# Patient Record
Sex: MALE | Race: WHITE | NOT HISPANIC OR LATINO | Employment: FULL TIME | URBAN - METROPOLITAN AREA
[De-identification: names, ages, dates, MRNs, and addresses within clinical notes are randomized per-mention and may not be internally consistent; named-entity substitution may affect disease eponyms.]

---

## 2017-01-17 ENCOUNTER — PATIENT MESSAGE (OUTPATIENT)
Dept: CARDIOLOGY | Facility: CLINIC | Age: 62
End: 2017-01-17

## 2017-01-23 DIAGNOSIS — Z95.1 S/P CABG X 4: ICD-10-CM

## 2017-01-23 DIAGNOSIS — Z95.5 S/P CORONARY ARTERY STENT PLACEMENT: ICD-10-CM

## 2017-01-23 DIAGNOSIS — I25.119 CORONARY ARTERY DISEASE INVOLVING NATIVE CORONARY ARTERY OF NATIVE HEART WITH ANGINA PECTORIS: ICD-10-CM

## 2017-01-24 RX ORDER — RANOLAZINE 500 MG/1
1000 TABLET, FILM COATED, EXTENDED RELEASE ORAL 2 TIMES DAILY
Qty: 360 TABLET | Refills: 3 | Status: SHIPPED | OUTPATIENT
Start: 2017-01-24 | End: 2018-02-23 | Stop reason: SDUPTHER

## 2017-02-06 ENCOUNTER — OFFICE VISIT (OUTPATIENT)
Dept: CARDIOLOGY | Facility: CLINIC | Age: 62
End: 2017-02-06
Payer: COMMERCIAL

## 2017-02-06 VITALS
HEIGHT: 70 IN | SYSTOLIC BLOOD PRESSURE: 114 MMHG | WEIGHT: 233.69 LBS | HEART RATE: 64 BPM | DIASTOLIC BLOOD PRESSURE: 79 MMHG | BODY MASS INDEX: 33.46 KG/M2

## 2017-02-06 DIAGNOSIS — I20.9 ISCHEMIC CHEST PAIN: ICD-10-CM

## 2017-02-06 DIAGNOSIS — Z95.1 S/P CABG X 4: ICD-10-CM

## 2017-02-06 DIAGNOSIS — I25.119 CORONARY ARTERY DISEASE INVOLVING NATIVE CORONARY ARTERY OF NATIVE HEART WITH ANGINA PECTORIS: Primary | ICD-10-CM

## 2017-02-06 DIAGNOSIS — I10 ESSENTIAL HYPERTENSION: ICD-10-CM

## 2017-02-06 DIAGNOSIS — Z95.5 S/P CORONARY ARTERY STENT PLACEMENT: ICD-10-CM

## 2017-02-06 PROCEDURE — 99999 PR PBB SHADOW E&M-EST. PATIENT-LVL III: CPT | Mod: PBBFAC,,, | Performed by: INTERNAL MEDICINE

## 2017-02-06 PROCEDURE — 93000 ELECTROCARDIOGRAM COMPLETE: CPT | Mod: S$GLB,,, | Performed by: INTERNAL MEDICINE

## 2017-02-06 PROCEDURE — 3078F DIAST BP <80 MM HG: CPT | Mod: S$GLB,,, | Performed by: INTERNAL MEDICINE

## 2017-02-06 PROCEDURE — 3074F SYST BP LT 130 MM HG: CPT | Mod: S$GLB,,, | Performed by: INTERNAL MEDICINE

## 2017-02-06 PROCEDURE — 99214 OFFICE O/P EST MOD 30 MIN: CPT | Mod: S$GLB,,, | Performed by: INTERNAL MEDICINE

## 2017-02-06 RX ORDER — ASPIRIN 325 MG
81 TABLET, DELAYED RELEASE (ENTERIC COATED) ORAL DAILY
COMMUNITY
End: 2023-05-16

## 2017-02-06 RX ORDER — LEVOTHYROXINE SODIUM 125 UG/1
125 TABLET ORAL DAILY
Refills: 3 | COMMUNITY
Start: 2017-01-18

## 2017-02-06 RX ORDER — ROSUVASTATIN CALCIUM 20 MG/1
20 TABLET, COATED ORAL DAILY
Qty: 30 TABLET | Refills: 11 | Status: SHIPPED | OUTPATIENT
Start: 2017-02-06 | End: 2017-02-08 | Stop reason: SDUPTHER

## 2017-02-06 NOTE — PROGRESS NOTES
Subjective:   Patient ID:  Preston Arzate is a 61 y.o. male who presents for follow-up of Coronary Artery Disease      HPI: He reports he has been having intermittent chest tightness and upper back pain since his mom passed away on 17. 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 On the morning of the , he awoke diaphoretic, weak and with pain in his chest. He was evaluated at [facility] and was told everything checked out. He was subsequently discharged from the ED and went to the . Since that time, he reports intermittent chest tightness and upper back pain, not related to exertion. He states that upper back pain was his initial presenting symptom prior to his bypass. He had been off of his Ranexa and imdur since last March. He is now taking half of an imdur tablet daily and has reordered Ranexa. He also reports diffuse muscle aches and pains.    ECG: NDR 60 bpm with anterior T wave inversions (slightly worse than on previous ECG).    Past Medical History   Diagnosis Date    Coronary artery disease     Hypertension     Thyroid disease        Past Surgical History   Procedure Laterality Date    Coronary artery bypass graft  07/2005     x 4    Total thyroidectomy      Cardiac catheterization  03/2015     x 2    Coronary angioplasty  12/15     GREGORIA X 2 left main and SVG to PDA       Social History     Social History    Marital status: Single     Spouse name: N/A    Number of children: N/A    Years of education: N/A     Social History Main Topics    Smoking status: Never Smoker    Smokeless tobacco: Not on file    Alcohol use No    Drug use: Not on file    Sexual activity: Not on file     Other Topics Concern    Not on file     Social History Narrative       Family History   Problem Relation Age of Onset    Hypertension Mother     Heart attack Father     Heart disease Sister     Hypertension Sister     Heart attack  Paternal Grandfather        Patient's Medications   New Prescriptions    ROSUVASTATIN (CRESTOR) 20 MG TABLET    Take 1 tablet (20 mg total) by mouth once daily.   Previous Medications    AMLODIPINE (NORVASC) 5 MG TABLET    Take 5 mg by mouth once daily.     ASPIRIN (ECOTRIN) 325 MG EC TABLET    Take 325 mg by mouth once daily.    BYSTOLIC 5 MG TAB    2.5 mg once daily.     CLOPIDOGREL (PLAVIX) 75 MG TABLET    Take 75 mg by mouth once daily.     COENZYME Q10 10 MG CAPSULE    Take 100 mg by mouth once daily.    DOCUSATE SODIUM (COLACE) 50 MG CAPSULE    Take 1 capsule (50 mg total) by mouth 2 (two) times daily.    FISH OIL-OMEGA-3 FATTY ACIDS 300-1,000 MG CAPSULE    Take 1,200 g by mouth once daily.    ISOSORBIDE MONONITRATE (IMDUR) 30 MG 24 HR TABLET    Take 2 tablets (60 mg total) by mouth once daily.    LEVOTHYROXINE (SYNTHROID) 125 MCG TABLET    TK 1 T PO QD    LOSARTAN (COZAAR) 50 MG TABLET    Take 50 mg by mouth once daily.     RANEXA 500 MG TB12    Take 2 tablets (1,000 mg total) by mouth 2 (two) times daily.    SENNOSIDES (SENNA) 8.6 MG CAP    Take 1 capsule by mouth once daily.   Modified Medications    No medications on file   Discontinued Medications    ASPIRIN (ECOTRIN) 81 MG EC TABLET    Take 1 tablet (81 mg total) by mouth once daily.    ATORVASTATIN (LIPITOR) 80 MG TABLET    Take 1 tablet (80 mg total) by mouth once daily.    LEVOTHYROXINE (SYNTHROID) 112 MCG TABLET    Take 112 mcg by mouth before breakfast.        Review of Systems   Constitution: Positive for malaise/fatigue. Negative for weakness and weight gain.   HENT: Negative for headaches and hearing loss.    Eyes: Negative for visual disturbance.   Cardiovascular: Positive for chest pain. Negative for claudication, dyspnea on exertion, leg swelling, near-syncope, orthopnea, palpitations, paroxysmal nocturnal dyspnea and syncope.   Respiratory: Negative for cough, shortness of breath, sleep disturbances due to breathing, snoring and wheezing.   "  Endocrine: Negative for cold intolerance, heat intolerance, polydipsia, polyphagia and polyuria.   Hematologic/Lymphatic: Negative for bleeding problem. Does not bruise/bleed easily.   Skin: Negative for rash and suspicious lesions.   Musculoskeletal: Positive for back pain and myalgias. Negative for arthritis, falls, joint pain and muscle weakness.   Gastrointestinal: Negative for abdominal pain, change in bowel habit, constipation, diarrhea, heartburn, hematochezia, melena and nausea.   Genitourinary: Negative for hematuria and nocturia.   Neurological: Negative for excessive daytime sleepiness, dizziness, light-headedness and loss of balance.   Psychiatric/Behavioral: Negative for depression. The patient is not nervous/anxious.    Allergic/Immunologic: Negative for environmental allergies.       Visit Vitals    /79 (BP Location: Left arm, Patient Position: Sitting, BP Method: Automatic)    Pulse 64    Ht 5' 10" (1.778 m)    Wt 106 kg (233 lb 11 oz)    BMI 33.53 kg/m2       Objective:   Physical Exam   Constitutional: He is oriented to person, place, and time. He appears well-developed and well-nourished.        HENT:   Head: Normocephalic and atraumatic.   Mouth/Throat: Oropharynx is clear and moist.   Eyes: Conjunctivae and EOM are normal. Pupils are equal, round, and reactive to light. No scleral icterus.   Neck: Normal range of motion. Neck supple. No hepatojugular reflux and no JVD present. No tracheal deviation present. No thyromegaly present.   Cardiovascular: Normal rate, regular rhythm, normal heart sounds and intact distal pulses.  PMI is not displaced.    Pulses:       Carotid pulses are 2+ on the right side, and 2+ on the left side.       Radial pulses are 2+ on the right side, and 2+ on the left side.        Dorsalis pedis pulses are 2+ on the right side, and 2+ on the left side.        Posterior tibial pulses are 2+ on the right side, and 2+ on the left side.   Pulmonary/Chest: Effort " normal and breath sounds normal.   Abdominal: Soft. Bowel sounds are normal. He exhibits no distension and no mass. There is no hepatosplenomegaly. There is no tenderness.   Musculoskeletal: He exhibits no edema or tenderness.   Lymphadenopathy:     He has no cervical adenopathy.   Neurological: He is alert and oriented to person, place, and time.   Skin: Skin is warm and dry. No rash noted. No cyanosis or erythema. Nails show no clubbing.   Psychiatric: He has a normal mood and affect. His speech is normal and behavior is normal.       Lab Results   Component Value Date     03/08/2016    K 4.5 03/08/2016     03/08/2016    CO2 27 03/08/2016    BUN 21 03/08/2016    CREATININE 1.5 (H) 03/08/2016    GLU 79 03/08/2016    MG 2.1 12/10/2015    AST 21 03/08/2016    ALT 30 03/08/2016    ALBUMIN 3.7 03/08/2016    PROT 7.9 03/08/2016    BILITOT 0.5 03/08/2016    WBC 9.31 12/10/2015    HGB 12.6 (L) 12/10/2015    HCT 38.4 (L) 12/10/2015    MCV 95 12/10/2015     12/10/2015    INR 0.9 12/08/2015    TSH 4.205 (H) 12/08/2015    CHOL 176 03/08/2016    HDL 41 03/08/2016    LDLCALC 111.4 03/08/2016    TRIG 118 03/08/2016       Assessment:     1. Coronary artery disease involving native coronary artery of native heart with angina pectoris : He is having new onset chest pain with some changes on his ECG. He is back on his anti-anginals. I have ordered a PET stress for further evaluation. I have switched his atorvastatin to crestor 20 mg and asked him to increase his co-Q10 to 200 mg daily for his myalgias. I will request his recent records from [facility].   2. Essential hypertension : Blood pressure is at goal. I have made no changes.   3. S/P CABG x 4    4. S/P coronary artery stent placement        Plan:     Preston was seen today for coronary artery disease.    Diagnoses and all orders for this visit:    Coronary artery disease involving native coronary artery of native heart with angina pectoris  -      rosuvastatin (CRESTOR) 20 MG tablet; Take 1 tablet (20 mg total) by mouth once daily.  -     EKG 12-lead; Future  -     Cardiac PET Scan Stress; Future  -     Lipid panel; Future  -     Hepatic function panel; Future    Essential hypertension  -     rosuvastatin (CRESTOR) 20 MG tablet; Take 1 tablet (20 mg total) by mouth once daily.  -     EKG 12-lead; Future  -     Cardiac PET Scan Stress; Future  -     Lipid panel; Future  -     Hepatic function panel; Future    S/P CABG x 4  -     rosuvastatin (CRESTOR) 20 MG tablet; Take 1 tablet (20 mg total) by mouth once daily.  -     EKG 12-lead; Future  -     Cardiac PET Scan Stress; Future  -     Lipid panel; Future  -     Hepatic function panel; Future    S/P coronary artery stent placement  -     rosuvastatin (CRESTOR) 20 MG tablet; Take 1 tablet (20 mg total) by mouth once daily.  -     EKG 12-lead; Future  -     Cardiac PET Scan Stress; Future  -     Lipid panel; Future  -     Hepatic function panel; Future        Thank you for allowing me to participate in this patient's care. Please do not hesitate to contact me with any questions or concerns.

## 2017-02-06 NOTE — MR AVS SNAPSHOT
[facility]  [address]  [place] [address]  Phone: [phone]                  Preston Arzate   2017 9:00 AM   Office Visit    Description:  Male : 1955   Provider:  Litzy Gutierres MD   Department:  [facility]           Reason for Visit     Coronary Artery Disease           Diagnoses this Visit        Comments    Coronary artery disease involving native coronary artery of native heart with angina pectoris    -  Primary     Essential hypertension         S/P CABG x 4         S/P coronary artery stent placement                To Do List           Goals (5 Years of Data)     None      Follow-Up and Disposition     Return in about 3 months (around 2017).       These Medications        Disp Refills Start End    rosuvastatin (CRESTOR) 20 MG tablet 30 tablet 11 2017     Take 1 tablet (20 mg total) by mouth once daily. - Oral    Pharm[facility]: [facility] [id number] [address] [facility] [address] Ph #: [phone]         [facility] On Call     [facility] On Call Nurse Care Line -  Assistance  Registered nurses in the [facility] On Call Center provide clinical advisement, health education, appointment booking, and other advisory services.  Call for this free service at [phone].             Medications           Message regarding Medications     Verify the changes and/or additions to your medication regime listed below are the same as discussed with your clinician today.  If any of these changes or additions are incorrect, please notify your healthcare provider.        START taking these NEW medications        Refills    rosuvastatin (CRESTOR) 20 MG tablet 11    Sig: Take 1 tablet (20 mg total) by mouth once daily.    Class: Normal    Route: Oral      STOP taking these medications     atorvastatin (LIPITOR) 80 MG tablet Take 1 tablet (80 mg total) by mouth once daily.           Verify that the below list of  "medications is an accurate representation of the medications you are currently taking.  If none reported, the list may be blank. If incorrect, please contact your healthcare provider. Carry this list with you in case of emergency.           Current Medications     amlodipine (NORVASC) 5 MG tablet Take 5 mg by mouth once daily.     aspirin (ECOTRIN) 325 MG EC tablet Take 325 mg by mouth once daily.    BYSTOLIC 5 mg Tab 2.5 mg once daily.     clopidogrel (PLAVIX) 75 mg tablet Take 75 mg by mouth once daily.     coenzyme Q10 10 mg capsule Take 100 mg by mouth once daily.    docusate sodium (COLACE) 50 MG capsule Take 1 capsule (50 mg total) by mouth 2 (two) times daily.    fish oil-omega-3 fatty acids 300-1,000 mg capsule Take 1,200 g by mouth once daily.    isosorbide mononitrate (IMDUR) 30 MG 24 hr tablet Take 2 tablets (60 mg total) by mouth once daily.    levothyroxine (SYNTHROID) 125 MCG tablet TK 1 T PO QD    losartan (COZAAR) 50 MG tablet Take 50 mg by mouth once daily.     RANEXA 500 mg Tb12 Take 2 tablets (1,000 mg total) by mouth 2 (two) times daily.    sennosides (SENNA) 8.6 mg Cap Take 1 capsule by mouth once daily.    rosuvastatin (CRESTOR) 20 MG tablet Take 1 tablet (20 mg total) by mouth once daily.           Clinical Reference Information           Your Vitals Were     BP Pulse Height Weight BMI    114/79 (BP Location: Left arm, Patient Position: Sitting, BP Method: Automatic) 64 5' 10" (1.778 m) 106 kg (233 lb 11 oz) 33.53 kg/m2      Blood Pressure          Most Recent Value    Right Arm BP - Sitting  108/68    Left Arm BP - Sitting  114/79    BP  114/79      Allergies as of 2/6/2017     No Known Allergies      Immunizations Administered on Date of Encounter - 2/6/2017     None      Orders Placed During Today's Visit     Future Labs/Procedures Expected by Expires    Hepatic function panel  3/20/2017 (Approximate) 2/6/2018    Lipid panel  3/20/2017 (Approximate) 2/6/2018    Cardiac PET Scan Stress  As " directed 2/6/2018    EKG 12-lead  As directed 2/6/2018      Language Assistance Services     ATTENTION: Language assistance services are available, free of charge. Please call [phone].      ATENCIÓN: Si umberto damon, tiene a ruggiero disposición servicios gratuitos de asistencia lingüística. Llame al [phone].     CHÚ Ý: N?u b?n nói Ti?ng Vi?t, có các d?ch v? h? tr? ngôn ng? mi?n phí dành cho b?n. G?i s? [phone].         [facility] complies with applicable Federal civil rights laws and does not discriminate on the basis of race, color, national origin, age, disability, or sex.

## 2017-02-08 DIAGNOSIS — Z95.1 S/P CABG X 4: ICD-10-CM

## 2017-02-08 DIAGNOSIS — Z95.5 S/P CORONARY ARTERY STENT PLACEMENT: ICD-10-CM

## 2017-02-08 DIAGNOSIS — I25.119 CORONARY ARTERY DISEASE INVOLVING NATIVE CORONARY ARTERY OF NATIVE HEART WITH ANGINA PECTORIS: ICD-10-CM

## 2017-02-08 DIAGNOSIS — I10 ESSENTIAL HYPERTENSION: ICD-10-CM

## 2017-02-08 RX ORDER — ROSUVASTATIN CALCIUM 20 MG/1
20 TABLET, COATED ORAL DAILY
Qty: 90 TABLET | Refills: 3 | Status: SHIPPED | OUTPATIENT
Start: 2017-02-08 | End: 2018-02-07

## 2017-03-20 ENCOUNTER — PATIENT MESSAGE (OUTPATIENT)
Dept: CARDIOLOGY | Facility: CLINIC | Age: 62
End: 2017-03-20

## 2017-03-21 ENCOUNTER — TELEPHONE (OUTPATIENT)
Dept: CARDIOLOGY | Facility: CLINIC | Age: 62
End: 2017-03-21

## 2017-03-29 DIAGNOSIS — I10 ESSENTIAL HYPERTENSION: Primary | ICD-10-CM

## 2017-03-29 RX ORDER — AMLODIPINE BESYLATE 5 MG/1
5 TABLET ORAL DAILY
Qty: 30 TABLET | Refills: 10 | Status: SHIPPED | OUTPATIENT
Start: 2017-03-29 | End: 2017-03-31 | Stop reason: SDUPTHER

## 2017-03-31 DIAGNOSIS — I10 ESSENTIAL HYPERTENSION: ICD-10-CM

## 2017-03-31 RX ORDER — AMLODIPINE BESYLATE 5 MG/1
5 TABLET ORAL DAILY
Qty: 90 TABLET | Refills: 3 | Status: SHIPPED | OUTPATIENT
Start: 2017-03-31 | End: 2018-01-11 | Stop reason: SDUPTHER

## 2017-05-01 ENCOUNTER — PATIENT MESSAGE (OUTPATIENT)
Dept: RESEARCH | Facility: HOSPITAL | Age: 62
End: 2017-05-01

## 2017-06-21 ENCOUNTER — PATIENT MESSAGE (OUTPATIENT)
Dept: CARDIOLOGY | Facility: CLINIC | Age: 62
End: 2017-06-21

## 2017-06-22 ENCOUNTER — TELEPHONE (OUTPATIENT)
Dept: CARDIOLOGY | Facility: CLINIC | Age: 62
End: 2017-06-22

## 2017-06-22 NOTE — TELEPHONE ENCOUNTER
----- Message from Meeta Christine MA sent at 6/22/2017 11:00 AM CDT -----  Contact: patient called  Lay please call the patient at home he need to talk to you about his condition tightness in his chest.Last visit was on 2-6-2017 . Thank you.

## 2017-06-22 NOTE — TELEPHONE ENCOUNTER
Spoke with the pt - says that he has a hx of stents, ACB, and MI. Says had his 1st flare up at his mother's  and went to the ED and was told was probably stress related - and symptoms went away. Says recently symptoms have started back - says the center of his back is hurting - [had this symptom when he had his MI], chest is tight, had aching in his arm, - symptoms went away, says symptoms returned the other night but not in his arms. Says today when he woke up he felt very tired, then back was aching , chest was tight - about 2 hours ago - says not having symptoms right now. Pt asking if he should go to the hospital in [place] or come to the main campus.  Spoke with Dr. Gutierres - record reviewed - advises the pt to go to the ED in [place] - instructions given to the pt and he verbalized understanding.

## 2017-08-04 ENCOUNTER — TELEPHONE (OUTPATIENT)
Dept: CARDIOLOGY | Facility: CLINIC | Age: 62
End: 2017-08-04

## 2017-08-14 RX ORDER — LOSARTAN POTASSIUM 50 MG/1
50 TABLET ORAL DAILY
Qty: 90 TABLET | Refills: 3 | Status: SHIPPED | OUTPATIENT
Start: 2017-08-14 | End: 2018-08-12 | Stop reason: SDUPTHER

## 2017-08-14 RX ORDER — ISOSORBIDE MONONITRATE 30 MG/1
60 TABLET, EXTENDED RELEASE ORAL DAILY
Refills: 5 | Status: CANCELLED
Start: 2017-08-14

## 2017-08-15 RX ORDER — ISOSORBIDE MONONITRATE 30 MG/1
30 TABLET, EXTENDED RELEASE ORAL DAILY
Qty: 30 TABLET | Refills: 6 | Status: SHIPPED | OUTPATIENT
Start: 2017-08-15 | End: 2017-08-15 | Stop reason: SDUPTHER

## 2017-08-16 RX ORDER — ISOSORBIDE MONONITRATE 30 MG/1
TABLET, EXTENDED RELEASE ORAL
Qty: 90 TABLET | Refills: 6 | Status: SHIPPED | OUTPATIENT
Start: 2017-08-16 | End: 2018-01-11

## 2018-01-08 ENCOUNTER — PATIENT MESSAGE (OUTPATIENT)
Dept: CARDIOLOGY | Facility: CLINIC | Age: 63
End: 2018-01-08

## 2018-01-09 ENCOUNTER — TELEPHONE (OUTPATIENT)
Dept: CARDIOLOGY | Facility: CLINIC | Age: 63
End: 2018-01-09

## 2018-01-09 DIAGNOSIS — G45.3 AMAUROSIS FUGAX: ICD-10-CM

## 2018-01-09 NOTE — TELEPHONE ENCOUNTER
I returned a call from Dr Orr who stated MR Arzate had an episode of amarosus fugax and evidence of embolization in his eye. I will set him up for carotid US, ecg, and echo.

## 2018-01-10 ENCOUNTER — HOSPITAL ENCOUNTER (OUTPATIENT)
Dept: CARDIOLOGY | Facility: CLINIC | Age: 63
Discharge: HOME | End: 2018-01-10
Attending: INTERNAL MEDICINE
Payer: COMMERCIAL

## 2018-01-10 ENCOUNTER — CLINICAL SUPPORT (OUTPATIENT)
Dept: CARDIOLOGY | Facility: CLINIC | Age: 63
End: 2018-01-10
Attending: INTERNAL MEDICINE
Payer: COMMERCIAL

## 2018-01-10 DIAGNOSIS — G45.3 AMAUROSIS FUGAX: ICD-10-CM

## 2018-01-10 LAB — INTERNAL CAROTID STENOSIS: ABNORMAL

## 2018-01-10 PROCEDURE — 93306 TTE W/DOPPLER COMPLETE: CPT | Mod: S$GLB,,, | Performed by: INTERNAL MEDICINE

## 2018-01-10 PROCEDURE — 93880 EXTRACRANIAL BILAT STUDY: CPT | Mod: S$GLB,,, | Performed by: INTERNAL MEDICINE

## 2018-01-11 ENCOUNTER — HOSPITAL ENCOUNTER (OUTPATIENT)
Dept: RADIOLOGY | Facility: HOSPITAL | Age: 63
Discharge: HOME | End: 2018-01-11
Attending: INTERNAL MEDICINE
Payer: COMMERCIAL

## 2018-01-11 ENCOUNTER — CLINICAL SUPPORT (OUTPATIENT)
Dept: ELECTROPHYSIOLOGY | Facility: CLINIC | Age: 63
End: 2018-01-11
Attending: INTERNAL MEDICINE
Payer: COMMERCIAL

## 2018-01-11 ENCOUNTER — HOSPITAL ENCOUNTER (OUTPATIENT)
Dept: CARDIOLOGY | Facility: CLINIC | Age: 63
Discharge: HOME | End: 2018-01-11
Payer: COMMERCIAL

## 2018-01-11 ENCOUNTER — OFFICE VISIT (OUTPATIENT)
Dept: CARDIOLOGY | Facility: CLINIC | Age: 63
End: 2018-01-11
Payer: COMMERCIAL

## 2018-01-11 VITALS
WEIGHT: 223.31 LBS | BODY MASS INDEX: 31.97 KG/M2 | SYSTOLIC BLOOD PRESSURE: 133 MMHG | HEART RATE: 77 BPM | HEIGHT: 70 IN | DIASTOLIC BLOOD PRESSURE: 82 MMHG

## 2018-01-11 DIAGNOSIS — I25.119 CORONARY ARTERY DISEASE INVOLVING NATIVE CORONARY ARTERY OF NATIVE HEART WITH ANGINA PECTORIS: ICD-10-CM

## 2018-01-11 DIAGNOSIS — I77.89 AORTIC ROOT ENLARGEMENT: ICD-10-CM

## 2018-01-11 DIAGNOSIS — I10 ESSENTIAL HYPERTENSION: ICD-10-CM

## 2018-01-11 DIAGNOSIS — G45.3 AMAUROSIS FUGAX: Primary | ICD-10-CM

## 2018-01-11 DIAGNOSIS — Z95.1 S/P CABG X 4: ICD-10-CM

## 2018-01-11 DIAGNOSIS — G45.3 AMAUROSIS FUGAX: ICD-10-CM

## 2018-01-11 DIAGNOSIS — Z95.5 S/P CORONARY ARTERY STENT PLACEMENT: ICD-10-CM

## 2018-01-11 LAB
ESTIMATED PA SYSTOLIC PRESSURE: 22.54
RETIRED EF AND QEF - SEE NOTES: 65 (ref 55–65)
TRICUSPID VALVE REGURGITATION: NORMAL

## 2018-01-11 PROCEDURE — 99999 PR PBB SHADOW E&M-EST. PATIENT-LVL IV: CPT | Mod: PBBFAC,,, | Performed by: INTERNAL MEDICINE

## 2018-01-11 PROCEDURE — 70549 MR ANGIOGRAPH NECK W/O&W/DYE: CPT | Mod: 26,,, | Performed by: RADIOLOGY

## 2018-01-11 PROCEDURE — A9585 GADOBUTROL INJECTION: HCPCS | Performed by: INTERNAL MEDICINE

## 2018-01-11 PROCEDURE — 93268 ECG RECORD/REVIEW: CPT | Mod: S$GLB,,, | Performed by: INTERNAL MEDICINE

## 2018-01-11 PROCEDURE — 93000 ELECTROCARDIOGRAM COMPLETE: CPT | Mod: S$GLB,,, | Performed by: INTERNAL MEDICINE

## 2018-01-11 PROCEDURE — 25500020 PHARM REV CODE 255: Performed by: INTERNAL MEDICINE

## 2018-01-11 PROCEDURE — 99214 OFFICE O/P EST MOD 30 MIN: CPT | Mod: S$GLB,,, | Performed by: INTERNAL MEDICINE

## 2018-01-11 PROCEDURE — 70549 MR ANGIOGRAPH NECK W/O&W/DYE: CPT | Mod: TC

## 2018-01-11 RX ORDER — GADOBUTROL 604.72 MG/ML
10 INJECTION INTRAVENOUS
Status: COMPLETED | OUTPATIENT
Start: 2018-01-11 | End: 2018-01-11

## 2018-01-11 RX ORDER — GADOBUTROL 604.72 MG/ML
10 INJECTION INTRAVENOUS
Status: DISCONTINUED | OUTPATIENT
Start: 2018-01-11 | End: 2018-01-12 | Stop reason: HOSPADM

## 2018-01-11 RX ORDER — AMLODIPINE BESYLATE 10 MG/1
10 TABLET ORAL DAILY
Qty: 90 TABLET | Refills: 3 | Status: SHIPPED | OUTPATIENT
Start: 2018-01-11 | End: 2019-03-10 | Stop reason: SDUPTHER

## 2018-01-11 RX ADMIN — GADOBUTROL 10 ML: 604.72 INJECTION INTRAVENOUS at 04:01

## 2018-01-11 NOTE — PROGRESS NOTES
Subjective:   Patient ID:  Preston Arzate is a 62 y.o. male who presents for follow-up of Vision disturbance (left eye)      HPI: He presents for an urgent visit after seeing his eye doctor on Friday for sudden loss of vision in the lower half of his left eye. He was told he had embolized. He has been on asa and plavix as well as crestor. He has not had any previous TIA's. He has no history of afib and denies palpitations. A carotid US done yesterday revealed 20-39% stenosis in the CHARLENE and 40-49% stenosis in the LICA. His echo showed preserved LVEF of 60-65% with a LAVERN of 39. His ascending Ao 4.4 cm.    ECG: NSR 79 bpm    Past Medical History:   Diagnosis Date    Coronary artery disease     Hypertension     Stroke 01/2018    amarousus fugax left    Thyroid disease        Past Surgical History:   Procedure Laterality Date    CARDIAC CATHETERIZATION  03/2015    x 2    CORONARY ANGIOPLASTY  12/15    GREGORIA X 2 left main and SVG to PDA    CORONARY ARTERY BYPASS GRAFT  07/2005    x 4    TOTAL THYROIDECTOMY         Social History     Social History    Marital status: Single     Spouse name: N/A    Number of children: N/A    Years of education: N/A     Social History Main Topics    Smoking status: Never Smoker    Smokeless tobacco: Not on file    Alcohol use No    Drug use: Unknown    Sexual activity: Not on file     Other Topics Concern    Not on file     Social History Narrative    No narrative on file       Family History   Problem Relation Age of Onset    Hypertension Mother     Heart attack Father     Heart disease Sister     Hypertension Sister     Heart attack Paternal Grandfather        Patient's Medications   New Prescriptions    No medications on file   Previous Medications    ASPIRIN (ECOTRIN) 325 MG EC TABLET    Take 325 mg by mouth once daily.    CLOPIDOGREL (PLAVIX) 75 MG TABLET    Take 75 mg by mouth once daily.     COENZYME Q10 10 MG CAPSULE    Take 100 mg by mouth once daily.     FISH OIL-OMEGA-3 FATTY ACIDS 300-1,000 MG CAPSULE    Take 1,200 g by mouth once daily.    LEVOTHYROXINE (SYNTHROID) 125 MCG TABLET    TK 1 T PO QD    LOSARTAN (COZAAR) 50 MG TABLET    Take 1 tablet (50 mg total) by mouth once daily.    RANEXA 500 MG TB12    Take 2 tablets (1,000 mg total) by mouth 2 (two) times daily.    ROSUVASTATIN (CRESTOR) 20 MG TABLET    Take 1 tablet (20 mg total) by mouth once daily.   Modified Medications    Modified Medication Previous Medication    AMLODIPINE (NORVASC) 10 MG TABLET amlodipine (NORVASC) 5 MG tablet       Take 1 tablet (10 mg total) by mouth once daily.    Take 1 tablet (5 mg total) by mouth once daily.   Discontinued Medications    BYSTOLIC 5 MG TAB    2.5 mg once daily.     DOCUSATE SODIUM (COLACE) 50 MG CAPSULE    Take 1 capsule (50 mg total) by mouth 2 (two) times daily.    ISOSORBIDE MONONITRATE (IMDUR) 30 MG 24 HR TABLET    TAKE 1 TABLET BY MOUTH EVERY DAY    SENNOSIDES (SENNA) 8.6 MG CAP    Take 1 capsule by mouth once daily.       Review of Systems   Constitution: Negative for weakness, malaise/fatigue and weight gain.   HENT: Negative for hearing loss.    Eyes: Positive for vision loss in left eye. Negative for visual disturbance.   Cardiovascular: Positive for chest pain. Negative for claudication, dyspnea on exertion, leg swelling, near-syncope, orthopnea, palpitations, paroxysmal nocturnal dyspnea and syncope.   Respiratory: Negative for cough, shortness of breath, sleep disturbances due to breathing, snoring and wheezing.    Endocrine: Negative for cold intolerance, heat intolerance, polydipsia, polyphagia and polyuria.   Hematologic/Lymphatic: Negative for bleeding problem. Does not bruise/bleed easily.   Skin: Negative for rash and suspicious lesions.   Musculoskeletal: Negative for arthritis, falls, joint pain, muscle weakness and myalgias.   Gastrointestinal: Positive for constipation. Negative for abdominal pain, change in bowel habit, diarrhea,  "heartburn, hematochezia, melena and nausea.   Genitourinary: Negative for hematuria and nocturia.   Neurological: Positive for headaches. Negative for excessive daytime sleepiness, dizziness, light-headedness and loss of balance.   Psychiatric/Behavioral: Negative for depression. The patient is not nervous/anxious.    Allergic/Immunologic: Negative for environmental allergies.       /82 (BP Location: Left arm, Patient Position: Sitting, BP Method: X-Large (Automatic))   Pulse 77   Ht 5' 10" (1.778 m)   Wt 101.3 kg (223 lb 5.2 oz)   BMI 32.04 kg/m²     Objective:   Physical Exam   Constitutional: He is oriented to person, place, and time. He appears well-developed and well-nourished.        HENT:   Head: Normocephalic and atraumatic.   Mouth/Throat: Oropharynx is clear and moist.   Eyes: Conjunctivae and EOM are normal. Pupils are equal, round, and reactive to light. No scleral icterus.   Neck: Normal range of motion. Neck supple. No hepatojugular reflux and no JVD present. No tracheal deviation present. No thyromegaly present.   Cardiovascular: Normal rate, regular rhythm, normal heart sounds and intact distal pulses.  PMI is not displaced.    Pulses:       Carotid pulses are 2+ on the right side, and 2+ on the left side.       Radial pulses are 2+ on the right side, and 2+ on the left side.        Dorsalis pedis pulses are 2+ on the right side, and 2+ on the left side.        Posterior tibial pulses are 2+ on the right side, and 2+ on the left side.   Pulmonary/Chest: Effort normal and breath sounds normal.   Abdominal: Soft. Bowel sounds are normal. He exhibits no distension and no mass. There is no hepatosplenomegaly. There is no tenderness.   Musculoskeletal: He exhibits no edema or tenderness.   Lymphadenopathy:     He has no cervical adenopathy.   Neurological: He is alert and oriented to person, place, and time.   Skin: Skin is warm and dry. No rash noted. No cyanosis or erythema. Nails show no " clubbing.   Psychiatric: He has a normal mood and affect. His speech is normal and behavior is normal.       Lab Results   Component Value Date     03/08/2016    K 4.5 03/08/2016     03/08/2016    CO2 27 03/08/2016    BUN 21 03/08/2016    CREATININE 1.5 (H) 03/08/2016    GLU 79 03/08/2016    MG 2.1 12/10/2015    AST 21 03/08/2016    ALT 30 03/08/2016    ALBUMIN 3.7 03/08/2016    PROT 7.9 03/08/2016    BILITOT 0.5 03/08/2016    WBC 9.31 12/10/2015    HGB 12.6 (L) 12/10/2015    HCT 38.4 (L) 12/10/2015    MCV 95 12/10/2015     12/10/2015    INR 0.9 12/08/2015    TSH 4.205 (H) 12/08/2015    CHOL 176 03/08/2016    HDL 41 03/08/2016    LDLCALC 111.4 03/08/2016    TRIG 118 03/08/2016       Assessment:     1. Amaurosis fugax : He has moderate plaque in his LICA on US. I have ordered an MRA for further assessment as well as a 30 day event monitor to assess for atrial fibrillation. Continue asa, plavix and crestor.   2. Coronary artery disease involving native coronary artery of native heart with angina pectoris : he was having terrible headaches with Imdur. Stop Imdur and increase amlodipine to 10 mg daily.   3. Essential hypertension : Blood pressure slightly above goal today. Increase amlodipine as above.   4. S/P CABG x 4    5. S/P coronary artery stent placement        Plan:     Preston was seen today for vision disturbance.    Diagnoses and all orders for this visit:    Amaurosis fugax  -     amLODIPine (NORVASC) 10 MG tablet; Take 1 tablet (10 mg total) by mouth once daily.  -     Cardiac event monitor; Future  -     MRA Neck with and without contrast; Future  -     Creatinine, serum; Future    Coronary artery disease involving native coronary artery of native heart with angina pectoris  -     amLODIPine (NORVASC) 10 MG tablet; Take 1 tablet (10 mg total) by mouth once daily.  -     Cardiac event monitor; Future  -     MRA Neck with and without contrast; Future    Essential hypertension  -      amLODIPine (NORVASC) 10 MG tablet; Take 1 tablet (10 mg total) by mouth once daily.  -     Cardiac event monitor; Future  -     MRA Neck with and without contrast; Future    S/P CABG x 4    S/P coronary artery stent placement        Thank you for allowing me to participate in this patient's care. Please do not hesitate to contact me with any questions or concerns.

## 2018-01-11 NOTE — PATIENT INSTRUCTIONS
Stop isosorbide.    Increase amlodipine to 10 mg daily.    30 day event monitor to assess for atrial fibrillation.    MRA neck to assess for degree of blockage in the carotid arteries.

## 2018-01-12 ENCOUNTER — TELEPHONE (OUTPATIENT)
Dept: CARDIOLOGY | Facility: CLINIC | Age: 63
End: 2018-01-12

## 2018-01-12 NOTE — TELEPHONE ENCOUNTER
----- Message from Litzy Gutierres MD sent at 1/12/2018 10:54 AM CST -----  MRA shows < 50% of the left carotid artery. No mechanical intervention is indicated.

## 2018-02-07 DIAGNOSIS — I10 ESSENTIAL HYPERTENSION: ICD-10-CM

## 2018-02-07 DIAGNOSIS — Z95.5 S/P CORONARY ARTERY STENT PLACEMENT: ICD-10-CM

## 2018-02-07 DIAGNOSIS — Z95.1 S/P CABG X 4: ICD-10-CM

## 2018-02-07 DIAGNOSIS — I25.119 CORONARY ARTERY DISEASE INVOLVING NATIVE CORONARY ARTERY OF NATIVE HEART WITH ANGINA PECTORIS: ICD-10-CM

## 2018-02-07 RX ORDER — ROSUVASTATIN CALCIUM 20 MG/1
TABLET, COATED ORAL
Qty: 30 TABLET | Refills: 0 | Status: SHIPPED | OUTPATIENT
Start: 2018-02-07 | End: 2018-02-07 | Stop reason: SDUPTHER

## 2018-02-08 RX ORDER — ROSUVASTATIN CALCIUM 20 MG/1
TABLET, COATED ORAL
Qty: 90 TABLET | Refills: 3 | Status: SHIPPED | OUTPATIENT
Start: 2018-02-08 | End: 2019-07-03 | Stop reason: SDUPTHER

## 2018-02-15 ENCOUNTER — PATIENT MESSAGE (OUTPATIENT)
Dept: CARDIOLOGY | Facility: CLINIC | Age: 63
End: 2018-02-15

## 2018-02-23 DIAGNOSIS — I25.119 CORONARY ARTERY DISEASE INVOLVING NATIVE CORONARY ARTERY OF NATIVE HEART WITH ANGINA PECTORIS: ICD-10-CM

## 2018-02-23 DIAGNOSIS — Z95.1 S/P CABG X 4: ICD-10-CM

## 2018-02-23 DIAGNOSIS — Z95.5 S/P CORONARY ARTERY STENT PLACEMENT: ICD-10-CM

## 2018-02-23 RX ORDER — RANOLAZINE 500 MG/1
1000 TABLET, FILM COATED, EXTENDED RELEASE ORAL 2 TIMES DAILY
Qty: 360 TABLET | Refills: 3 | Status: SHIPPED | OUTPATIENT
Start: 2018-02-23 | End: 2019-03-31 | Stop reason: SDUPTHER

## 2018-04-16 ENCOUNTER — PATIENT MESSAGE (OUTPATIENT)
Dept: CARDIOLOGY | Facility: CLINIC | Age: 63
End: 2018-04-16

## 2018-04-19 ENCOUNTER — PATIENT MESSAGE (OUTPATIENT)
Dept: RHEUMATOLOGY | Facility: CLINIC | Age: 63
End: 2018-04-19

## 2018-04-19 ENCOUNTER — OFFICE VISIT (OUTPATIENT)
Dept: CARDIOLOGY | Facility: CLINIC | Age: 63
End: 2018-04-19
Payer: COMMERCIAL

## 2018-04-19 VITALS
HEART RATE: 64 BPM | WEIGHT: 228.81 LBS | BODY MASS INDEX: 32.76 KG/M2 | OXYGEN SATURATION: 98 % | HEIGHT: 70 IN | DIASTOLIC BLOOD PRESSURE: 78 MMHG | SYSTOLIC BLOOD PRESSURE: 123 MMHG

## 2018-04-19 DIAGNOSIS — E03.9 HYPOTHYROIDISM, UNSPECIFIED TYPE: ICD-10-CM

## 2018-04-19 DIAGNOSIS — Z95.5 S/P CORONARY ARTERY STENT PLACEMENT: ICD-10-CM

## 2018-04-19 DIAGNOSIS — Z95.1 S/P CABG X 4: Primary | ICD-10-CM

## 2018-04-19 DIAGNOSIS — R53.83 FATIGUE, UNSPECIFIED TYPE: ICD-10-CM

## 2018-04-19 DIAGNOSIS — I10 ESSENTIAL HYPERTENSION: ICD-10-CM

## 2018-04-19 PROCEDURE — 99214 OFFICE O/P EST MOD 30 MIN: CPT | Mod: S$PBB,,, | Performed by: STUDENT IN AN ORGANIZED HEALTH CARE EDUCATION/TRAINING PROGRAM

## 2018-04-19 PROCEDURE — 99999 PR PBB SHADOW E&M-EST. PATIENT-LVL V: CPT | Mod: PBBFAC,,, | Performed by: STUDENT IN AN ORGANIZED HEALTH CARE EDUCATION/TRAINING PROGRAM

## 2018-04-19 NOTE — PROGRESS NOTES
Cardiology Clinic Note  Reason for Visit: Follow-up    HPI:   63 year old male with a history of CABG x 4(2015), PCI 2015(LM and SVG to PDA), HTN, TIA, moderate LICA stenosis, REMBERTO on CPAP, hypothyroidism who presents for follow up.    Last seen by Dr. Gutierres on 1/11/18 for follow up on symptoms of amaurosis fugax. He had denied a history of arrhythmias so a 30 day event monitor and MRA was ordered which showed less than 50% stenosis of the left carotid artery, with not intervention indicated. His 30 day event monitor was also negative for any atrial arrhythmias.     On today's visit, he states for the last week or so he has had some leg swelling, fatigue when he wakes up(poor rest), occassionally has to take a deep breath(denies shortness of breath), has point pains in left bicep and sometimes left/right shoulder that come and go over hours. His anginal pain was a squeezing pain in his back on index presentation. He states a week ago he had some back pain associated with stress but not as severe as his index presentation. He also notes the appearance of blood in his right eye. Also notes poor sleep lately. Denies sick contacts or recent illness. Denies recurrence of symptoms of amaurosis fugax.    Works in automotive sales, walks a significant amount at work and has never had any activity related symptoms.     ROS:    Constitution: Negative for fever, chills, weight loss or gain.   HENT: Negative for sore throat, rhinorrhea, or headache.  Eyes: Negative for blurred or double vision.   Cardiovascular: See above  Pulmonary: Negative for SOB   Gastrointestinal: Negative for abdominal pain, nausea, vomiting, or diarrhea.   : Negative for dysuria.   Neurological: Negative for focal weakness or sensory changes.  PMH:     Past Medical History:   Diagnosis Date    Coronary artery disease     Hypertension     Stroke 01/2018    amarousus fugax left    Thyroid disease      Past Surgical History:   Procedure Laterality  "Date    CARDIAC CATHETERIZATION  03/2015    x 2    CORONARY ANGIOPLASTY  12/15    GREGORIA X 2 left main and SVG to PDA    CORONARY ARTERY BYPASS GRAFT  07/2005    x 4    TOTAL THYROIDECTOMY       Allergies:     Review of patient's allergies indicates:   Allergen Reactions    Imdur [isosorbide mononitrate]      headaches     Medications:     Current Outpatient Prescriptions on File Prior to Visit   Medication Sig Dispense Refill    amLODIPine (NORVASC) 10 MG tablet Take 1 tablet (10 mg total) by mouth once daily. 90 tablet 3    aspirin (ECOTRIN) 325 MG EC tablet Take 325 mg by mouth once daily.      clopidogrel (PLAVIX) 75 mg tablet Take 75 mg by mouth once daily.   3    coenzyme Q10 10 mg capsule Take 100 mg by mouth once daily.      fish oil-omega-3 fatty acids 300-1,000 mg capsule Take 1,200 g by mouth once daily.      levothyroxine (SYNTHROID) 125 MCG tablet TK 1 T PO QD  3    losartan (COZAAR) 50 MG tablet Take 1 tablet (50 mg total) by mouth once daily. 90 tablet 3    RANEXA 500 mg Tb12 Take 2 tablets (1,000 mg total) by mouth 2 (two) times daily. 360 tablet 3    rosuvastatin (CRESTOR) 20 MG tablet TAKE 1 TABLET(20 MG) BY MOUTH EVERY DAY 90 tablet 3     No current facility-administered medications on file prior to visit.      Social History:     Social History   Substance Use Topics    Smoking status: Never Smoker    Smokeless tobacco: Never Used    Alcohol use No     Family History:     Family History   Problem Relation Age of Onset    Hypertension Mother     Heart attack Father     Heart disease Sister     Hypertension Sister     Heart attack Paternal Grandfather      Physical Exam:   /78 (BP Location: Left arm, Patient Position: Sitting, BP Method: Large (Automatic))   Pulse 64   Ht 5' 10" (1.778 m)   Wt 103.8 kg (228 lb 13.4 oz)   SpO2 98%   BMI 32.83 kg/m²      Constitutional: NAD, conversant  HEENT: Sclera anicteric, PERRLA, EOMI, +subconjuctival hemmorhage on right " eye      Neck: No JVD, no carotid bruits  CV: RRR, no murmur, normal S1/S2  Pulm: CTAB, no wheezes, rales, or ronchi  GI: Abdomen soft, NTND, +BS  Extremities: 1+ LE edema, warm and well perfused  Skin: No ecchymosis, erythema, or ulcers  Psych: AOx3, appropriate affect    Labs:     Lab Results   Component Value Date     2016    K 4.5 2016     2016    CO2 27 2016    BUN 21 2016    CREATININE 1.5 (H) 2018    ANIONGAP 11 2016     No results found for: HGBA1C  No results found for: BNP, BNPTRIAGEBLO Lab Results   Component Value Date    WBC 9.31 12/10/2015    HGB 12.6 (L) 12/10/2015    HCT 38.4 (L) 12/10/2015     12/10/2015    GRAN 7.0 12/10/2015    GRAN 75.3 (H) 12/10/2015     Lab Results   Component Value Date    CHOL 176 2016    HDL 41 2016    LDLCALC 111.4 2016    TRIG 118 2016          Imaging:   TTE 1/10/18  CONCLUSIONS     1 - Normal left ventricular systolic function (EF 60-65%).     2 - No wall motion abnormalities.     3 - Concentric remodeling.     4 - Mild left atrial enlargement.     5 - Mildly enlarged ascending aorta.     6 - Indeterminate LV diastolic function.     7 - Normal right ventricular systolic function .     8 - The estimated PA systolic pressure is 23 mmHg.     9 - Mild tricuspid regurgitation.     EF   Date Value Ref Range Status   01/10/2018 65 55 - 65        EK18 NSR  Assessment:   63 year old male with a history of CABG x 4(), PCI (LM and SVG to PDA), HTN, TIA, moderate LICA stenosis, REMBERTO on CPAP, hypothyroidism who presents for follow up.    Plan:   CAD s/p CABG and LM/SVG stenting  - symptoms are vague on presentation however back symptoms are concerning for his anginal equivalent, will order PET stress to risk stratify  - Continue current meds    Amaurosis Fugax  - no recurrence    Fatigue  - TSH ordered  - Sleep disorder referral for assessment of CPAP settings    HTN  - controlled,  continue current meds  - amlodipine likely culprit for leg swelling, not causing patient too much both at this time and is not an acute problem    Signed:  Jb Nicole DO  Cardiology Fellow  Pager - [phone]  4/19/2018 12:51 PM

## 2018-04-19 NOTE — PROGRESS NOTES
I have personally taken the history and examined this patient and agree with the Fellow's note as stated above.    With back pain reminiscent of his ischemic discomfort plus presence of LMCA stent, will proceed with PET stress which was abnormal prior to stenting.  Pt agrees.

## 2018-05-03 ENCOUNTER — TELEPHONE (OUTPATIENT)
Dept: CARDIOLOGY | Facility: CLINIC | Age: 63
End: 2018-05-03

## 2018-05-14 ENCOUNTER — CLINICAL SUPPORT (OUTPATIENT)
Dept: CARDIOLOGY | Facility: CLINIC | Age: 63
End: 2018-05-14
Attending: STUDENT IN AN ORGANIZED HEALTH CARE EDUCATION/TRAINING PROGRAM
Payer: COMMERCIAL

## 2018-05-14 DIAGNOSIS — Z95.5 S/P CORONARY ARTERY STENT PLACEMENT: ICD-10-CM

## 2018-05-14 DIAGNOSIS — Z95.1 S/P CABG X 4: ICD-10-CM

## 2018-05-14 LAB — DIASTOLIC DYSFUNCTION: NO

## 2018-05-14 PROCEDURE — 78492 MYOCRD IMG PET MLT RST&STRS: CPT | Mod: S$GLB,,, | Performed by: INTERNAL MEDICINE

## 2018-05-14 PROCEDURE — 93015 CV STRESS TEST SUPVJ I&R: CPT | Mod: S$GLB,,, | Performed by: INTERNAL MEDICINE

## 2018-05-14 PROCEDURE — A9555 RB82 RUBIDIUM: HCPCS | Mod: S$GLB,,, | Performed by: INTERNAL MEDICINE

## 2018-05-15 ENCOUNTER — PATIENT MESSAGE (OUTPATIENT)
Dept: CARDIOLOGY | Facility: CLINIC | Age: 63
End: 2018-05-15

## 2018-05-15 ENCOUNTER — PATIENT MESSAGE (OUTPATIENT)
Dept: CARDIOLOGY | Facility: HOSPITAL | Age: 63
End: 2018-05-15

## 2018-06-21 RX ORDER — CLOPIDOGREL BISULFATE 75 MG/1
75 TABLET ORAL DAILY
Qty: 90 TABLET | Refills: 3 | Status: SHIPPED | OUTPATIENT
Start: 2018-06-21 | End: 2019-07-03 | Stop reason: SDUPTHER

## 2018-08-13 RX ORDER — LOSARTAN POTASSIUM 50 MG/1
TABLET ORAL
Qty: 90 TABLET | Refills: 0 | Status: SHIPPED | OUTPATIENT
Start: 2018-08-13 | End: 2019-07-03 | Stop reason: SDUPTHER

## 2018-10-01 PROBLEM — E78.00 PURE HYPERCHOLESTEROLEMIA: Status: ACTIVE | Noted: 2018-10-01

## 2019-03-10 DIAGNOSIS — I25.119 CORONARY ARTERY DISEASE INVOLVING NATIVE CORONARY ARTERY OF NATIVE HEART WITH ANGINA PECTORIS: ICD-10-CM

## 2019-03-10 DIAGNOSIS — I10 ESSENTIAL HYPERTENSION: ICD-10-CM

## 2019-03-10 DIAGNOSIS — G45.3 AMAUROSIS FUGAX: ICD-10-CM

## 2019-03-12 RX ORDER — AMLODIPINE BESYLATE 10 MG/1
TABLET ORAL
Qty: 90 TABLET | Refills: 3 | Status: SHIPPED | OUTPATIENT
Start: 2019-03-12 | End: 2019-07-03 | Stop reason: SDUPTHER

## 2019-03-31 DIAGNOSIS — I25.119 CORONARY ARTERY DISEASE INVOLVING NATIVE CORONARY ARTERY OF NATIVE HEART WITH ANGINA PECTORIS: ICD-10-CM

## 2019-03-31 DIAGNOSIS — Z95.1 S/P CABG X 4: ICD-10-CM

## 2019-03-31 DIAGNOSIS — Z95.5 S/P CORONARY ARTERY STENT PLACEMENT: ICD-10-CM

## 2019-04-02 RX ORDER — RANOLAZINE 500 MG/1
TABLET, FILM COATED, EXTENDED RELEASE ORAL
Qty: 360 TABLET | Refills: 6 | Status: SHIPPED | OUTPATIENT
Start: 2019-04-02 | End: 2019-07-03

## 2019-05-01 DIAGNOSIS — Z95.1 S/P CABG X 4: ICD-10-CM

## 2019-05-01 DIAGNOSIS — Z95.5 S/P CORONARY ARTERY STENT PLACEMENT: ICD-10-CM

## 2019-05-01 DIAGNOSIS — I25.119 CORONARY ARTERY DISEASE INVOLVING NATIVE CORONARY ARTERY OF NATIVE HEART WITH ANGINA PECTORIS: ICD-10-CM

## 2019-05-01 RX ORDER — RANOLAZINE 1000 MG/1
1000 TABLET, EXTENDED RELEASE ORAL 2 TIMES DAILY
Qty: 60 TABLET | Refills: 1 | Status: SHIPPED | OUTPATIENT
Start: 2019-05-01 | End: 2019-07-03 | Stop reason: SDUPTHER

## 2019-05-01 RX ORDER — RANOLAZINE 1000 MG/1
1000 TABLET, EXTENDED RELEASE ORAL 2 TIMES DAILY
Qty: 60 TABLET | Refills: 11 | Status: CANCELLED | OUTPATIENT
Start: 2019-05-01

## 2019-05-03 ENCOUNTER — TELEPHONE (OUTPATIENT)
Dept: CARDIOLOGY | Facility: CLINIC | Age: 64
End: 2019-05-03

## 2019-05-03 NOTE — TELEPHONE ENCOUNTER
Spoke with the pt regarding Renaxa. Lainey says that generic is covered for $50.00, and pt has a $50.00 deductible. Spoke with the pt and offered phone number for [facility] Pt Assistance Program. Says he is with a customer and will call back on Monday.

## 2019-06-11 ENCOUNTER — PATIENT MESSAGE (OUTPATIENT)
Dept: CARDIOLOGY | Facility: CLINIC | Age: 64
End: 2019-06-11

## 2019-06-12 ENCOUNTER — PATIENT MESSAGE (OUTPATIENT)
Dept: CARDIOLOGY | Facility: CLINIC | Age: 64
End: 2019-06-12

## 2019-06-28 ENCOUNTER — PATIENT MESSAGE (OUTPATIENT)
Dept: CARDIOLOGY | Facility: CLINIC | Age: 64
End: 2019-06-28

## 2019-07-03 ENCOUNTER — TELEPHONE (OUTPATIENT)
Dept: CARDIOLOGY | Facility: CLINIC | Age: 64
End: 2019-07-03

## 2019-07-03 ENCOUNTER — PATIENT MESSAGE (OUTPATIENT)
Dept: CARDIOLOGY | Facility: CLINIC | Age: 64
End: 2019-07-03

## 2019-07-03 ENCOUNTER — OFFICE VISIT (OUTPATIENT)
Dept: CARDIOLOGY | Facility: CLINIC | Age: 64
End: 2019-07-03
Payer: COMMERCIAL

## 2019-07-03 ENCOUNTER — LAB VISIT (OUTPATIENT)
Dept: LAB | Facility: HOSPITAL | Age: 64
End: 2019-07-03
Attending: INTERNAL MEDICINE
Payer: COMMERCIAL

## 2019-07-03 VITALS
WEIGHT: 221.13 LBS | BODY MASS INDEX: 31.66 KG/M2 | HEIGHT: 70 IN | SYSTOLIC BLOOD PRESSURE: 113 MMHG | HEART RATE: 65 BPM | DIASTOLIC BLOOD PRESSURE: 72 MMHG

## 2019-07-03 DIAGNOSIS — I25.119 CORONARY ARTERY DISEASE INVOLVING NATIVE CORONARY ARTERY OF NATIVE HEART WITH ANGINA PECTORIS: ICD-10-CM

## 2019-07-03 DIAGNOSIS — E78.00 PURE HYPERCHOLESTEROLEMIA: ICD-10-CM

## 2019-07-03 DIAGNOSIS — Z95.1 S/P CABG X 4: ICD-10-CM

## 2019-07-03 DIAGNOSIS — G45.3 AMAUROSIS FUGAX: Primary | ICD-10-CM

## 2019-07-03 DIAGNOSIS — Z95.5 S/P CORONARY ARTERY STENT PLACEMENT: ICD-10-CM

## 2019-07-03 DIAGNOSIS — G45.3 AMAUROSIS FUGAX: ICD-10-CM

## 2019-07-03 DIAGNOSIS — I10 ESSENTIAL HYPERTENSION: ICD-10-CM

## 2019-07-03 DIAGNOSIS — M60.9 MYOSITIS, UNSPECIFIED MYOSITIS TYPE, UNSPECIFIED SITE: ICD-10-CM

## 2019-07-03 LAB
ALBUMIN SERPL BCP-MCNC: 4.1 G/DL (ref 3.5–5.2)
ALP SERPL-CCNC: 84 U/L (ref 55–135)
ALT SERPL W/O P-5'-P-CCNC: 28 U/L (ref 10–44)
ANION GAP SERPL CALC-SCNC: 9 MMOL/L (ref 8–16)
AST SERPL-CCNC: 24 U/L (ref 10–40)
BILIRUB SERPL-MCNC: 0.4 MG/DL (ref 0.1–1)
BUN SERPL-MCNC: 17 MG/DL (ref 8–23)
CALCIUM SERPL-MCNC: 10 MG/DL (ref 8.7–10.5)
CHLORIDE SERPL-SCNC: 104 MMOL/L (ref 95–110)
CK SERPL-CCNC: 302 U/L (ref 20–200)
CO2 SERPL-SCNC: 29 MMOL/L (ref 23–29)
CREAT SERPL-MCNC: 1.7 MG/DL (ref 0.5–1.4)
EST. GFR  (AFRICAN AMERICAN): 48.2 ML/MIN/1.73 M^2
EST. GFR  (NON AFRICAN AMERICAN): 41.7 ML/MIN/1.73 M^2
GLUCOSE SERPL-MCNC: 91 MG/DL (ref 70–110)
POTASSIUM SERPL-SCNC: 4.3 MMOL/L (ref 3.5–5.1)
PROT SERPL-MCNC: 8.7 G/DL (ref 6–8.4)
SODIUM SERPL-SCNC: 142 MMOL/L (ref 136–145)

## 2019-07-03 PROCEDURE — 3008F PR BODY MASS INDEX (BMI) DOCUMENTED: ICD-10-PCS | Mod: CPTII,S$GLB,, | Performed by: INTERNAL MEDICINE

## 2019-07-03 PROCEDURE — 99999 PR PBB SHADOW E&M-EST. PATIENT-LVL III: CPT | Mod: PBBFAC,,, | Performed by: INTERNAL MEDICINE

## 2019-07-03 PROCEDURE — 3078F PR MOST RECENT DIASTOLIC BLOOD PRESSURE < 80 MM HG: ICD-10-PCS | Mod: CPTII,S$GLB,, | Performed by: INTERNAL MEDICINE

## 2019-07-03 PROCEDURE — 82550 ASSAY OF CK (CPK): CPT

## 2019-07-03 PROCEDURE — 3074F PR MOST RECENT SYSTOLIC BLOOD PRESSURE < 130 MM HG: ICD-10-PCS | Mod: CPTII,S$GLB,, | Performed by: INTERNAL MEDICINE

## 2019-07-03 PROCEDURE — 36415 COLL VENOUS BLD VENIPUNCTURE: CPT

## 2019-07-03 PROCEDURE — 99214 PR OFFICE/OUTPT VISIT, EST, LEVL IV, 30-39 MIN: ICD-10-PCS | Mod: S$GLB,,, | Performed by: INTERNAL MEDICINE

## 2019-07-03 PROCEDURE — 99999 PR PBB SHADOW E&M-EST. PATIENT-LVL III: ICD-10-PCS | Mod: PBBFAC,,, | Performed by: INTERNAL MEDICINE

## 2019-07-03 PROCEDURE — 3078F DIAST BP <80 MM HG: CPT | Mod: CPTII,S$GLB,, | Performed by: INTERNAL MEDICINE

## 2019-07-03 PROCEDURE — 3074F SYST BP LT 130 MM HG: CPT | Mod: CPTII,S$GLB,, | Performed by: INTERNAL MEDICINE

## 2019-07-03 PROCEDURE — 99214 OFFICE O/P EST MOD 30 MIN: CPT | Mod: S$GLB,,, | Performed by: INTERNAL MEDICINE

## 2019-07-03 PROCEDURE — 80053 COMPREHEN METABOLIC PANEL: CPT

## 2019-07-03 PROCEDURE — 3008F BODY MASS INDEX DOCD: CPT | Mod: CPTII,S$GLB,, | Performed by: INTERNAL MEDICINE

## 2019-07-03 RX ORDER — CLOPIDOGREL BISULFATE 75 MG/1
75 TABLET ORAL DAILY
Qty: 90 TABLET | Refills: 3 | Status: SHIPPED | OUTPATIENT
Start: 2019-07-03 | End: 2019-07-03 | Stop reason: SDUPTHER

## 2019-07-03 RX ORDER — RANOLAZINE 1000 MG/1
1000 TABLET, EXTENDED RELEASE ORAL 2 TIMES DAILY
Qty: 180 TABLET | Refills: 3 | Status: SHIPPED | OUTPATIENT
Start: 2019-07-03 | End: 2020-07-08

## 2019-07-03 RX ORDER — LOSARTAN POTASSIUM 50 MG/1
TABLET ORAL
Qty: 90 TABLET | Refills: 3 | Status: SHIPPED | OUTPATIENT
Start: 2019-07-03 | End: 2020-08-31

## 2019-07-03 RX ORDER — ROSUVASTATIN CALCIUM 20 MG/1
TABLET, COATED ORAL
Qty: 90 TABLET | Refills: 3 | Status: SHIPPED | OUTPATIENT
Start: 2019-07-03 | End: 2021-12-15

## 2019-07-03 RX ORDER — CLOPIDOGREL BISULFATE 75 MG/1
75 TABLET ORAL DAILY
Qty: 90 TABLET | Refills: 3 | Status: SHIPPED | OUTPATIENT
Start: 2019-07-03 | End: 2020-09-10 | Stop reason: SDUPTHER

## 2019-07-03 RX ORDER — NEBIVOLOL HYDROCHLORIDE 5 MG/1
TABLET ORAL
Refills: 3 | COMMUNITY
Start: 2019-06-08 | End: 2019-07-03 | Stop reason: SDUPTHER

## 2019-07-03 RX ORDER — AMLODIPINE BESYLATE 10 MG/1
TABLET ORAL
Qty: 90 TABLET | Refills: 3 | Status: SHIPPED | OUTPATIENT
Start: 2019-07-03 | End: 2020-10-20 | Stop reason: SDUPTHER

## 2019-07-03 RX ORDER — ETODOLAC 400 MG/1
TABLET, FILM COATED ORAL
Refills: 3 | COMMUNITY
Start: 2019-06-08 | End: 2020-06-05 | Stop reason: CLARIF

## 2019-07-03 RX ORDER — TESTOSTERONE CYPIONATE 200 MG/ML
INJECTION, SOLUTION INTRAMUSCULAR
Refills: 0 | COMMUNITY
Start: 2019-05-09 | End: 2020-06-05 | Stop reason: CLARIF

## 2019-07-03 RX ORDER — NEBIVOLOL HYDROCHLORIDE 5 MG/1
TABLET ORAL
Qty: 90 TABLET | Refills: 3 | Status: SHIPPED | OUTPATIENT
Start: 2019-07-03 | End: 2020-10-20 | Stop reason: SDUPTHER

## 2019-07-03 NOTE — PROGRESS NOTES
Subjective:   Patient ID:  Preston Arzate is a 64 y.o. male who presents for follow-up of S/P CABG x 4 (3 months fu, discuss medication)      HPI: He had been feeling great until running out of his medications. His prescriptions were refilled by his PCP and he was inadvertently given atorvastatin and rosuvastatin. He reported diffuse severe mylagias, weakness and fatigue before realizing he was taking two statins. He stopped them both about 10 days ago and his symptoms resolved. He had a recent trip to [place] where he walked 11 miles a day. His last ischemia evaluation was a PET stress 5/18 which showed 5% area of ischemia in the PDA territory and 5% area of ischemia in the cx territory. An event monitor from 1/18 was normal. Carotid dopplers 1/18 20-39% CHARLENE and 40-49% LICA.    ECG (5/14/18): NSR 60.    Past Medical History:   Diagnosis Date    Coronary artery disease     Hypertension     Pure hypercholesterolemia 10/1/2018    Stroke 01/2018    amarousus fugax left    Thyroid disease        Past Surgical History:   Procedure Laterality Date    CARDIAC CATHETERIZATION  03/2015    x 2    CORONARY ANGIOPLASTY  12/15    GREGORIA X 2 left main and SVG to PDA    CORONARY ARTERY BYPASS GRAFT  07/2005    x 4    HEART CATH-LEFT Left 12/9/2015    [facility]formed [facility]    TOTAL THYROIDECTOMY         Social History     Socioeconomic History    Marital status: Single     Spouse name: Not on file    Number of children: Not on file    Years of education: Not on file    Highest education level: Not on file   Occupational History    Not on file   Social Needs    Financial resource strain: Not on file    Food insecurity:     Worry: Not on file     Inability: Not on file    Transportation needs:     Medical: Not on file     Non-medical: Not on file   Tobacco Use    Smoking status: Never Smoker    Smokeless tobacco: Never Used   Substance and Sexual Activity    Alcohol  use: No    Drug use: Not on file    Sexual activity: Not on file   Lifestyle    Physical activity:     Days per week: Not on file     Minutes per session: Not on file    Stress: Not on file   Relationships    Social connections:     Talks on phone: Not on file     Gets together: Not on file     Attends Bahai service: Not on file     Active member of club or organization: Not on file     Attends meetings of clubs or organizations: Not on file     Relationship status: Not on file   Other Topics Concern    Not on file   Social History Narrative    Not on file       Family History   Problem Relation Age of Onset    Hypertension Mother     Heart attack Father     Heart disease Sister     Hypertension Sister     Heart attack Paternal Grandfather        Patient's Medications   New Prescriptions    No medications on file   Previous Medications    ASPIRIN (ECOTRIN) 325 MG EC TABLET    Take 325 mg by mouth once daily.    COENZYME Q10 10 MG CAPSULE    Take 100 mg by mouth once daily.    ETODOLAC (LODINE) 400 MG TABLET    TK 1 T PO BID    FISH OIL-OMEGA-3 FATTY ACIDS 300-1,000 MG CAPSULE    Take 1,200 g by mouth once daily.    LEVOTHYROXINE (SYNTHROID) 125 MCG TABLET    TK 1 T PO QD    TESTOSTERONE CYPIONATE (DEPOTESTOTERONE CYPIONATE) 200 MG/ML INJECTION    INJECT 1 ML INTO THE MUSCLE EVERY 2 WEEKS   Modified Medications    Modified Medication Previous Medication    AMLODIPINE (NORVASC) 10 MG TABLET amLODIPine (NORVASC) 10 MG tablet       TAKE 1 TABLET(10 MG) BY MOUTH EVERY DAY    TAKE 1 TABLET(10 MG) BY MOUTH EVERY DAY    BYSTOLIC 5 MG TAB BYSTOLIC 5 mg Tab       TK 1 T PO QD    TK 1 T PO QD    CLOPIDOGREL (PLAVIX) 75 MG TABLET clopidogrel (PLAVIX) 75 mg tablet       Take 1 tablet (75 mg total) by mouth once daily.    Take 1 tablet (75 mg total) by mouth once daily.    LOSARTAN (COZAAR) 50 MG TABLET losartan (COZAAR) 50 MG tablet       TAKE 1 TABLET(50 MG) BY MOUTH EVERY DAY    TAKE 1 TABLET(50 MG) BY MOUTH  "EVERY DAY    RANOLAZINE (RANEXA) 1,000 MG TB12 ranolazine (RANEXA) 1,000 mg Tb12       Take 1 tablet (1,000 mg total) by mouth 2 (two) times daily.    Take 1 tablet (1,000 mg total) by mouth 2 (two) times daily.    ROSUVASTATIN (CRESTOR) 20 MG TABLET rosuvastatin (CRESTOR) 20 MG tablet       TAKE 1 TABLET(20 MG) BY MOUTH EVERY DAY    TAKE 1 TABLET(20 MG) BY MOUTH EVERY DAY   Discontinued Medications    ATORVASTATIN CALCIUM (ATORVASTATIN ORAL)    Take by mouth once daily.    RANEXA 500 MG TB12    TAKE 2 TABLETS BY MOUTH TWICE DAILY       Review of Systems   Constitution: Negative for malaise/fatigue and weight gain.   HENT: Negative for hearing loss.    Eyes: Negative for visual disturbance.   Cardiovascular: Negative for chest pain, claudication, dyspnea on exertion, leg swelling, near-syncope, orthopnea, palpitations, paroxysmal nocturnal dyspnea and syncope.   Respiratory: Negative for cough, shortness of breath, sleep disturbances due to breathing, snoring and wheezing.    Endocrine: Negative for cold intolerance, heat intolerance, polydipsia, polyphagia and polyuria.   Hematologic/Lymphatic: Negative for bleeding problem. Does not bruise/bleed easily.   Skin: Negative for rash and suspicious lesions.   Musculoskeletal: Negative for arthritis, falls, joint pain, muscle weakness and myalgias.   Gastrointestinal: Negative for abdominal pain, change in bowel habit, constipation, diarrhea, heartburn, hematochezia, melena and nausea.   Genitourinary: Negative for hematuria and nocturia.   Neurological: Negative for excessive daytime sleepiness, dizziness, headaches, light-headedness, loss of balance and weakness.   Psychiatric/Behavioral: Negative for depression. The patient is not nervous/anxious.    Allergic/Immunologic: Negative for environmental allergies.       /72 (BP Location: Left arm, Patient Position: Sitting, BP Method: X-Large (Automatic))   Pulse 65   Ht 5' 10" (1.778 m)   Wt 100.3 kg (221 lb 1.9 " oz)   BMI 31.73 kg/m²     Objective:   Physical Exam   Constitutional: He is oriented to person, place, and time. He appears well-developed and well-nourished.        HENT:   Head: Normocephalic and atraumatic.   Mouth/Throat: Oropharynx is clear and moist.   Eyes: Pupils are equal, round, and reactive to light. Conjunctivae and EOM are normal. No scleral icterus.   Neck: Normal range of motion. Neck supple. No hepatojugular reflux and no JVD present. No tracheal deviation present. No thyromegaly present.   Cardiovascular: Normal rate, regular rhythm, normal heart sounds and intact distal pulses. PMI is not displaced.   Pulses:       Carotid pulses are 2+ on the right side, and 2+ on the left side.       Radial pulses are 2+ on the right side, and 2+ on the left side.        Dorsalis pedis pulses are 2+ on the right side, and 2+ on the left side.        Posterior tibial pulses are 2+ on the right side, and 2+ on the left side.   Pulmonary/Chest: Effort normal and breath sounds normal.   Abdominal: Soft. Bowel sounds are normal. He exhibits no distension and no mass. There is no hepatosplenomegaly. There is no tenderness.   Musculoskeletal: He exhibits tenderness. He exhibits no edema.   Trace pretibial edema   Lymphadenopathy:     He has no cervical adenopathy.   Neurological: He is alert and oriented to person, place, and time.   Skin: Skin is warm and dry. No rash noted. No cyanosis or erythema. Nails show no clubbing.   Psychiatric: He has a normal mood and affect. His speech is normal and behavior is normal.       Lab Results   Component Value Date     07/03/2019    K 4.3 07/03/2019     07/03/2019    CO2 29 07/03/2019    BUN 17 07/03/2019    CREATININE 1.7 (H) 07/03/2019    GLU 91 07/03/2019    MG 2.1 12/10/2015    AST 24 07/03/2019    ALT 28 07/03/2019    ALBUMIN 4.1 07/03/2019    PROT 8.7 (H) 07/03/2019    BILITOT 0.4 07/03/2019    WBC 9.31 12/10/2015    HGB 12.6 (L) 12/10/2015    HCT 38.4 (L)  12/10/2015    MCV 95 12/10/2015     12/10/2015    INR 0.9 12/08/2015    TSH 0.645 04/19/2018    CHOL 176 03/08/2016    HDL 41 03/08/2016    LDLCALC 111.4 03/08/2016    TRIG 118 03/08/2016       Assessment:     1. Amaurosis fugax : No recurrence. Continue asa and statin therapy. Carotid US prior to next visit.   2. Coronary artery disease involving native coronary artery of native heart with angina pectoris : He is asymptomatic with preserved LVEF. Continue asa and high intensity statin therapy.   3. Essential hypertension : Blood pressure is at goal. I have made no changes. Continue current regimen.   4. Pure hypercholesterolemia : CK and CMP today. If normal, restart rosuvastatin 20 mg daily.   5. S/P CABG x 4    6. S/P coronary artery stent placement        Plan:     Preston was seen today for s/p cabg x 4.    Diagnoses and all orders for this visit:    Amaurosis fugax  -     amLODIPine (NORVASC) 10 MG tablet; TAKE 1 TABLET(10 MG) BY MOUTH EVERY DAY  -     CK; Future  -     Comprehensive metabolic panel; Future  -     Hepatic function panel; Future  -     Lipid panel; Future    Coronary artery disease involving native coronary artery of native heart with angina pectoris  -     rosuvastatin (CRESTOR) 20 MG tablet; TAKE 1 TABLET(20 MG) BY MOUTH EVERY DAY  -     amLODIPine (NORVASC) 10 MG tablet; TAKE 1 TABLET(10 MG) BY MOUTH EVERY DAY  -     CK; Future  -     Comprehensive metabolic panel; Future  -     Hepatic function panel; Future  -     Lipid panel; Future  -     CV Ultrasound Bilateral Doppler Carotid; Future    Essential hypertension  -     rosuvastatin (CRESTOR) 20 MG tablet; TAKE 1 TABLET(20 MG) BY MOUTH EVERY DAY  -     amLODIPine (NORVASC) 10 MG tablet; TAKE 1 TABLET(10 MG) BY MOUTH EVERY DAY  -     CK; Future  -     Comprehensive metabolic panel; Future  -     Hepatic function panel; Future  -     Lipid panel; Future    Pure hypercholesterolemia  -     CK; Future  -     Comprehensive metabolic  panel; Future  -     Hepatic function panel; Future  -     Lipid panel; Future    S/P CABG x 4  -     rosuvastatin (CRESTOR) 20 MG tablet; TAKE 1 TABLET(20 MG) BY MOUTH EVERY DAY  -     CK; Future  -     Comprehensive metabolic panel; Future  -     Hepatic function panel; Future  -     Lipid panel; Future    S/P coronary artery stent placement  -     rosuvastatin (CRESTOR) 20 MG tablet; TAKE 1 TABLET(20 MG) BY MOUTH EVERY DAY  -     CK; Future  -     Comprehensive metabolic panel; Future  -     Hepatic function panel; Future  -     Lipid panel; Future    Other orders  -     ranolazine (RANEXA) 1,000 mg Tb12; Take 1 tablet (1,000 mg total) by mouth 2 (two) times daily.  -     losartan (COZAAR) 50 MG tablet; TAKE 1 TABLET(50 MG) BY MOUTH EVERY DAY  -     clopidogrel (PLAVIX) 75 mg tablet; Take 1 tablet (75 mg total) by mouth once daily.  -     BYSTOLIC 5 mg Tab; TK 1 T PO QD        Thank you for allowing me to participate in this patient's care. Please do not hesitate to contact me with any questions or concerns.

## 2019-08-14 ENCOUNTER — PATIENT MESSAGE (OUTPATIENT)
Dept: CARDIOLOGY | Facility: CLINIC | Age: 64
End: 2019-08-14

## 2019-08-14 ENCOUNTER — LAB VISIT (OUTPATIENT)
Dept: LAB | Facility: HOSPITAL | Age: 64
End: 2019-08-14
Attending: INTERNAL MEDICINE
Payer: COMMERCIAL

## 2019-08-14 DIAGNOSIS — Z95.5 S/P CORONARY ARTERY STENT PLACEMENT: ICD-10-CM

## 2019-08-14 DIAGNOSIS — Z95.1 S/P CABG X 4: ICD-10-CM

## 2019-08-14 DIAGNOSIS — G45.3 AMAUROSIS FUGAX: ICD-10-CM

## 2019-08-14 DIAGNOSIS — I10 ESSENTIAL HYPERTENSION: ICD-10-CM

## 2019-08-14 DIAGNOSIS — I25.119 CORONARY ARTERY DISEASE INVOLVING NATIVE CORONARY ARTERY OF NATIVE HEART WITH ANGINA PECTORIS: ICD-10-CM

## 2019-08-14 DIAGNOSIS — E78.00 PURE HYPERCHOLESTEROLEMIA: ICD-10-CM

## 2019-08-14 LAB
ALBUMIN SERPL BCP-MCNC: 3.6 G/DL (ref 3.5–5.2)
ALP SERPL-CCNC: 75 U/L (ref 55–135)
ALT SERPL W/O P-5'-P-CCNC: 16 U/L (ref 10–44)
AST SERPL-CCNC: 14 U/L (ref 10–40)
BILIRUB DIRECT SERPL-MCNC: 0.1 MG/DL (ref 0.1–0.3)
BILIRUB SERPL-MCNC: 0.3 MG/DL (ref 0.1–1)
CHOLEST SERPL-MCNC: 270 MG/DL (ref 120–199)
CHOLEST/HDLC SERPL: 7.1 {RATIO} (ref 2–5)
HDLC SERPL-MCNC: 38 MG/DL (ref 40–75)
HDLC SERPL: 14.1 % (ref 20–50)
LDLC SERPL CALC-MCNC: 201 MG/DL (ref 63–159)
NONHDLC SERPL-MCNC: 232 MG/DL
PROT SERPL-MCNC: 7.9 G/DL (ref 6–8.4)
TRIGL SERPL-MCNC: 155 MG/DL (ref 30–150)

## 2019-08-14 PROCEDURE — 80061 LIPID PANEL: CPT

## 2019-08-14 PROCEDURE — 36415 COLL VENOUS BLD VENIPUNCTURE: CPT | Mod: PO

## 2019-08-14 PROCEDURE — 80076 HEPATIC FUNCTION PANEL: CPT

## 2019-09-10 DIAGNOSIS — E78.5 HYPERLIPIDEMIA, UNSPECIFIED HYPERLIPIDEMIA TYPE: Primary | ICD-10-CM

## 2019-09-11 ENCOUNTER — PATIENT MESSAGE (OUTPATIENT)
Dept: CARDIOLOGY | Facility: CLINIC | Age: 64
End: 2019-09-11

## 2019-10-03 ENCOUNTER — LAB VISIT (OUTPATIENT)
Dept: LAB | Facility: HOSPITAL | Age: 64
End: 2019-10-03
Attending: INTERNAL MEDICINE
Payer: COMMERCIAL

## 2019-10-03 DIAGNOSIS — E78.5 HYPERLIPIDEMIA, UNSPECIFIED HYPERLIPIDEMIA TYPE: ICD-10-CM

## 2019-10-03 PROCEDURE — 36415 COLL VENOUS BLD VENIPUNCTURE: CPT | Mod: PO

## 2019-10-03 PROCEDURE — 80061 LIPID PANEL: CPT

## 2019-10-04 ENCOUNTER — PATIENT MESSAGE (OUTPATIENT)
Dept: CARDIOLOGY | Facility: CLINIC | Age: 64
End: 2019-10-04

## 2019-10-04 LAB
CHOLEST SERPL-MCNC: 125 MG/DL (ref 120–199)
CHOLEST/HDLC SERPL: 3.7 {RATIO} (ref 2–5)
HDLC SERPL-MCNC: 34 MG/DL (ref 40–75)
HDLC SERPL: 27.2 % (ref 20–50)
LDLC SERPL CALC-MCNC: 70 MG/DL (ref 63–159)
NONHDLC SERPL-MCNC: 91 MG/DL
TRIGL SERPL-MCNC: 105 MG/DL (ref 30–150)

## 2020-06-05 ENCOUNTER — CLINICAL SUPPORT (OUTPATIENT)
Dept: CARDIOLOGY | Facility: HOSPITAL | Age: 65
End: 2020-06-05
Attending: HOSPITALIST
Payer: COMMERCIAL

## 2020-06-05 ENCOUNTER — HOSPITAL ENCOUNTER (OUTPATIENT)
Facility: HOSPITAL | Age: 65
Discharge: HOME | End: 2020-06-05
Attending: EMERGENCY MEDICINE | Admitting: HOSPITALIST
Payer: COMMERCIAL

## 2020-06-05 VITALS
OXYGEN SATURATION: 96 % | BODY MASS INDEX: 31.97 KG/M2 | WEIGHT: 223.31 LBS | RESPIRATION RATE: 20 BRPM | HEART RATE: 63 BPM | DIASTOLIC BLOOD PRESSURE: 79 MMHG | HEIGHT: 70 IN | SYSTOLIC BLOOD PRESSURE: 139 MMHG | TEMPERATURE: 98 F

## 2020-06-05 DIAGNOSIS — R07.9 CHEST PAIN: Primary | ICD-10-CM

## 2020-06-05 PROBLEM — N18.30 CKD (CHRONIC KIDNEY DISEASE) STAGE 3, GFR 30-59 ML/MIN: Status: ACTIVE | Noted: 2020-06-05

## 2020-06-05 LAB
ALBUMIN SERPL BCP-MCNC: 4 G/DL (ref 3.5–5.2)
ALP SERPL-CCNC: 71 U/L (ref 55–135)
ALT SERPL W/O P-5'-P-CCNC: 28 U/L (ref 10–44)
ANION GAP SERPL CALC-SCNC: 12 MMOL/L (ref 8–16)
AST SERPL-CCNC: 20 U/L (ref 10–40)
BASOPHILS # BLD AUTO: 0.07 K/UL (ref 0–0.2)
BASOPHILS NFR BLD: 0.9 % (ref 0–1.9)
BILIRUB SERPL-MCNC: 0.7 MG/DL (ref 0.1–1)
BUN SERPL-MCNC: 17 MG/DL (ref 8–23)
CALCIUM SERPL-MCNC: 9.2 MG/DL (ref 8.7–10.5)
CHLORIDE SERPL-SCNC: 104 MMOL/L (ref 95–110)
CO2 SERPL-SCNC: 24 MMOL/L (ref 23–29)
CREAT SERPL-MCNC: 1.7 MG/DL (ref 0.5–1.4)
DIFFERENTIAL METHOD: ABNORMAL
EOSINOPHIL # BLD AUTO: 0.2 K/UL (ref 0–0.5)
EOSINOPHIL NFR BLD: 2.6 % (ref 0–8)
ERYTHROCYTE [DISTWIDTH] IN BLOOD BY AUTOMATED COUNT: 18.9 % (ref 11.5–14.5)
EST. GFR  (AFRICAN AMERICAN): 47.9 ML/MIN/1.73 M^2
EST. GFR  (NON AFRICAN AMERICAN): 41.4 ML/MIN/1.73 M^2
GLUCOSE SERPL-MCNC: 101 MG/DL (ref 70–110)
HCT VFR BLD AUTO: 45.2 % (ref 40–54)
HGB BLD-MCNC: 14.5 G/DL (ref 14–18)
IMM GRANULOCYTES # BLD AUTO: 0.05 K/UL (ref 0–0.04)
IMM GRANULOCYTES NFR BLD AUTO: 0.6 % (ref 0–0.5)
INR PPP: 1
LYMPHOCYTES # BLD AUTO: 2.1 K/UL (ref 1–4.8)
LYMPHOCYTES NFR BLD: 27.4 % (ref 18–48)
MAGNESIUM SERPL-MCNC: 2 MG/DL (ref 1.6–2.6)
MCH RBC QN AUTO: 27.7 PG (ref 27–31)
MCHC RBC AUTO-ENTMCNC: 32.1 G/DL (ref 32–36)
MCV RBC AUTO: 86 FL (ref 82–98)
MONOCYTES # BLD AUTO: 0.8 K/UL (ref 0.3–1)
MONOCYTES NFR BLD: 9.7 % (ref 4–15)
NEUTROPHILS # BLD AUTO: 4.5 K/UL (ref 1.8–7.7)
NEUTROPHILS NFR BLD: 58.8 % (ref 38–73)
NRBC BLD-RTO: 0 /100 WBC
PLATELET # BLD AUTO: 253 K/UL (ref 150–350)
PMV BLD AUTO: 10.7 FL (ref 9.2–12.9)
POTASSIUM SERPL-SCNC: 3.9 MMOL/L (ref 3.5–5.1)
PROT SERPL-MCNC: 8.1 G/DL (ref 6–8.4)
PROTHROMBIN TIME: 12.6 SEC (ref 10.6–14.8)
RBC # BLD AUTO: 5.23 M/UL (ref 4.6–6.2)
SARS-COV-2 RDRP RESP QL NAA+PROBE: NEGATIVE
SODIUM SERPL-SCNC: 140 MMOL/L (ref 136–145)
TROPONIN I SERPL DL<=0.01 NG/ML-MCNC: <0.03 NG/ML
TROPONIN I SERPL DL<=0.01 NG/ML-MCNC: <0.03 NG/ML
WBC # BLD AUTO: 7.71 K/UL (ref 3.9–12.7)

## 2020-06-05 PROCEDURE — G0378 HOSPITAL OBSERVATION PER HR: HCPCS

## 2020-06-05 PROCEDURE — 83735 ASSAY OF MAGNESIUM: CPT

## 2020-06-05 PROCEDURE — 80053 COMPREHEN METABOLIC PANEL: CPT

## 2020-06-05 PROCEDURE — 36415 COLL VENOUS BLD VENIPUNCTURE: CPT

## 2020-06-05 PROCEDURE — 93005 ELECTROCARDIOGRAM TRACING: CPT | Performed by: INTERNAL MEDICINE

## 2020-06-05 PROCEDURE — 25000003 PHARM REV CODE 250: Performed by: HOSPITALIST

## 2020-06-05 PROCEDURE — 84484 ASSAY OF TROPONIN QUANT: CPT | Mod: 91

## 2020-06-05 PROCEDURE — 85610 PROTHROMBIN TIME: CPT

## 2020-06-05 PROCEDURE — U0002 COVID-19 LAB TEST NON-CDC: HCPCS

## 2020-06-05 PROCEDURE — 99285 EMERGENCY DEPT VISIT HI MDM: CPT | Mod: 25

## 2020-06-05 PROCEDURE — 93306 TTE W/DOPPLER COMPLETE: CPT

## 2020-06-05 PROCEDURE — 85025 COMPLETE CBC W/AUTO DIFF WBC: CPT

## 2020-06-05 PROCEDURE — 84484 ASSAY OF TROPONIN QUANT: CPT

## 2020-06-05 RX ORDER — LANOLIN ALCOHOL/MO/W.PET/CERES
800 CREAM (GRAM) TOPICAL
Status: DISCONTINUED | OUTPATIENT
Start: 2020-06-05 | End: 2020-06-05 | Stop reason: HOSPADM

## 2020-06-05 RX ORDER — NEBIVOLOL 5 MG/1
5 TABLET ORAL DAILY
Status: DISCONTINUED | OUTPATIENT
Start: 2020-06-05 | End: 2020-06-05 | Stop reason: HOSPADM

## 2020-06-05 RX ORDER — POTASSIUM CHLORIDE 20 MEQ/15ML
40 SOLUTION ORAL
Status: DISCONTINUED | OUTPATIENT
Start: 2020-06-05 | End: 2020-06-05 | Stop reason: HOSPADM

## 2020-06-05 RX ORDER — SODIUM,POTASSIUM PHOSPHATES 280-250MG
2 POWDER IN PACKET (EA) ORAL
Status: DISCONTINUED | OUTPATIENT
Start: 2020-06-05 | End: 2020-06-05 | Stop reason: HOSPADM

## 2020-06-05 RX ORDER — SODIUM CHLORIDE 0.9 % (FLUSH) 0.9 %
10 SYRINGE (ML) INJECTION
Status: DISCONTINUED | OUTPATIENT
Start: 2020-06-05 | End: 2020-06-05 | Stop reason: HOSPADM

## 2020-06-05 RX ORDER — ACETAMINOPHEN 325 MG/1
650 TABLET ORAL EVERY 4 HOURS PRN
Status: DISCONTINUED | OUTPATIENT
Start: 2020-06-05 | End: 2020-06-05 | Stop reason: HOSPADM

## 2020-06-05 RX ORDER — CLOPIDOGREL BISULFATE 75 MG/1
75 TABLET ORAL DAILY
Status: DISCONTINUED | OUTPATIENT
Start: 2020-06-05 | End: 2020-06-05 | Stop reason: HOSPADM

## 2020-06-05 RX ORDER — ROSUVASTATIN CALCIUM 20 MG/1
20 TABLET, COATED ORAL NIGHTLY
Status: DISCONTINUED | OUTPATIENT
Start: 2020-06-05 | End: 2020-06-05 | Stop reason: HOSPADM

## 2020-06-05 RX ORDER — RANOLAZINE 500 MG/1
1000 TABLET, EXTENDED RELEASE ORAL 2 TIMES DAILY
Status: DISCONTINUED | OUTPATIENT
Start: 2020-06-05 | End: 2020-06-05 | Stop reason: HOSPADM

## 2020-06-05 RX ORDER — ETODOLAC 400 MG/1
400 TABLET, FILM COATED ORAL 2 TIMES DAILY
COMMUNITY

## 2020-06-05 RX ORDER — ASPIRIN 325 MG
325 TABLET, DELAYED RELEASE (ENTERIC COATED) ORAL DAILY
Status: DISCONTINUED | OUTPATIENT
Start: 2020-06-06 | End: 2020-06-05 | Stop reason: HOSPADM

## 2020-06-05 RX ORDER — HYDROCODONE BITARTRATE AND ACETAMINOPHEN 5; 325 MG/1; MG/1
1 TABLET ORAL EVERY 6 HOURS PRN
Status: DISCONTINUED | OUTPATIENT
Start: 2020-06-05 | End: 2020-06-05 | Stop reason: HOSPADM

## 2020-06-05 RX ORDER — TALC
6 POWDER (GRAM) TOPICAL NIGHTLY PRN
Status: DISCONTINUED | OUTPATIENT
Start: 2020-06-05 | End: 2020-06-05 | Stop reason: HOSPADM

## 2020-06-05 RX ORDER — ASPIRIN 325 MG
325 TABLET ORAL
Status: DISCONTINUED | OUTPATIENT
Start: 2020-06-05 | End: 2020-06-05

## 2020-06-05 RX ORDER — MORPHINE SULFATE 2 MG/ML
2 INJECTION, SOLUTION INTRAMUSCULAR; INTRAVENOUS EVERY 4 HOURS PRN
Status: DISCONTINUED | OUTPATIENT
Start: 2020-06-05 | End: 2020-06-05 | Stop reason: HOSPADM

## 2020-06-05 RX ORDER — AMLODIPINE BESYLATE 5 MG/1
10 TABLET ORAL DAILY
Status: DISCONTINUED | OUTPATIENT
Start: 2020-06-05 | End: 2020-06-05 | Stop reason: HOSPADM

## 2020-06-05 RX ADMIN — NEBIVOLOL HYDROCHLORIDE 5 MG: 5 TABLET ORAL at 12:06

## 2020-06-05 RX ADMIN — AMLODIPINE BESYLATE 10 MG: 5 TABLET ORAL at 12:06

## 2020-06-05 RX ADMIN — CLOPIDOGREL BISULFATE 75 MG: 75 TABLET, FILM COATED ORAL at 12:06

## 2020-06-05 NOTE — ED NOTES
LOC: The patient is awake, alert and aware of environment with an appropriate affect, the patient is oriented x 3 and speaking appropriately.  APPEARANCE: Patient resting comfortably and in no acute distress, patient is clean and well groomed, patient's clothing properly fastened.  SKIN: The skin is warm and dry, color consistent with ethnicity, patient has normal skin turgor and moist mucus membranes, skin intact, no breakdown or brusing noted.  MUSKULOSKELETAL: Patient moving all extremities well, no obvious swelling or deformities noted.  RESPIRATORY: Airway is open and patent, respirations are spontaneous, patient has a normal effort and rate, no accessory muscle use noted.  CARDIAC: Patient has a normal rate and rhythm, no peripheral edema noted, capillary refill < 3 seconds. Patient here for L shoulder pain radiating to L arm. Patient states this started around 0700 but pain is slowly subsiding. Patient has a hx of cardiac stents.   ABDOMEN: Soft and non tender to palpation, no distention noted.  NEUROLOGIC: PERRL, 3mm bilaterally, eyes open spontaneously, behavior appropriate to situation, follows commands, facial expression symmetrical, bilateral hand grasp equal and even, purposeful motor response noted, normal sensation in all extremities when touched with a finger.

## 2020-06-05 NOTE — DISCHARGE SUMMARY
Former[facility] Medicine  Discharge Summary    DOS: 06/05/2020 at 05:20pm      Patient Name: Preston Arzate  MRN: [id number]  Admission Date: 6/5/2020  Hospital Length of Stay: 0 days  Discharge Date and Time:  06/05/2020 5:29 PM  Attending Physician: Sana Steele MD   Discharging Provider: Sana Steele MD  Primary Care Provider: Arnaldo Smiley II, MD      HPI:   65-year-old gentleman with past medical history of CAD status post CABG, stenting, hypertension, hyperlipidemia, hypothyroidism presented with chief complaint of left arm and shoulder pain.  Upon further asking patient mentioned that he was in his usual state of health until today morning around 7:30 a.m. when he was brushing his teeth he started experiencing pressure-like pain involving his left shoulder, left neck radiating to his arm with some diaphoresis and dyspnea.  Considering his cardiac history he decided to come to ED.  He received aspirin and nitroglycerin and pain has resolved now.  Otherwise denies any recent changes in his medications, recent exertion or stress.  Otherwise denies any fever, chills, headache, dizziness, palpitations, nausea, vomiting, bladder or bowel symptoms.     * No surgery found *      Hospital Course:   During his very brief hospital stay patient was admitted for chest pain with both typical and atypical features.  Acute coronary syndrome was ruled out by serial EKGs and negative cardiac enzymes.  Patient was evaluated by Dr. PEDRO LUIS Cespedes who cleared him for discharge and recommended outpatient follow-up for further ischemic workup.  Upon my reassessment patient denies any active pain.  He was discharged home in hemodynamically stable condition without any new prescriptions as he is already taking dual antiplatelets, beta blocker and losartan along with statin and Ranexa.  He was advised to follow-up with Dr.C Cespedes or  for further workup.     Review of systems:  Comprehensive  review of system negative    Physical Exam   Constitutional: He is oriented to person, place, and time. He appears well-developed and well-nourished.   Obese   HENT:   Head: Atraumatic.   Mouth/Throat: Oropharynx is clear and moist.   Eyes: Pupils are equal, round, and reactive to light. EOM are normal.   Neck: Neck supple. No JVD present.   Cardiovascular: Normal rate, regular rhythm and normal heart sounds. Exam reveals no gallop.   No murmur heard.  Pulmonary/Chest: Breath sounds normal. No respiratory distress. He has no wheezes.   Abdominal: Soft. Bowel sounds are normal. He exhibits no distension. There is no rebound and no guarding.   Musculoskeletal: He exhibits no edema.   Lymphadenopathy:   He has no cervical adenopathy.   Neurological: He is alert and oriented to person, place, and time. No cranial nerve deficit.   Skin: Skin is warm. Capillary refill takes less than 2 seconds. No rash noted.    Psychiatric: His behavior is normal.     Nursing note and vitals reviewed.     Consults:   Consults (From admission, onward)        Status Ordering Provider     Inpatient consult to Cardiology  Once     Provider:  Laz Cespedes MD    Completed SANA STEVE     Inpatient consult to Hospitalist  Once     Provider:  Sana Steve MD    Acknowledged SANA STEVE          No new Assessment & Plan notes have been filed under this hospital service since the last note was generated.  Service: Hospital Medicine    Final Active Diagnoses:    Diagnosis Date Noted POA    CKD (chronic kidney disease) stage 3, GFR 30-59 ml/min [N18.3] 06/05/2020 Yes    S/P CABG x 4 [Z95.1] 10/23/2015 Not Applicable    S/P coronary artery stent placement [Z95.5] 10/23/2015 Not Applicable    Essential hypertension [I10] 10/23/2015 Yes    Pure hypercholesterolemia [E78.00] 10/01/2018 Yes    Hypothyroidism [E03.9] 12/08/2015 Yes      Problems Resolved During this Admission:    Diagnosis Date Noted Date Resolved POA     PRINCIPAL PROBLEM:  Angina pectoris [I20.9] 12/09/2015 06/05/2020 Yes    Chest pain [R07.9] 06/05/2020 06/05/2020 Yes       Discharged Condition: good    Disposition: Home or Self Care    Follow Up:  Follow-up Information     Laz Cespedes MD In 2 weeks.    Specialties:  Cardiovascular Disease, Cardiology, INTERVENTIONAL CARDIOLOGY  Contact information:  [address] [place] [address]  [address]  [facility] [address]  [phone]                 Patient Instructions:      Diet Cardiac     Notify your health care provider if you experience any of the following:  severe uncontrolled pain     Activity as tolerated       Significant Diagnostic Studies: Labs:   BMP:   Recent Labs   Lab 06/05/20  0809         K 3.9      CO2 24   BUN 17   CREATININE 1.7*   CALCIUM 9.2   MG 2.0    and Troponin   Recent Labs   Lab 06/05/20  1414   TROPONINI <0.030       Pending Diagnostic Studies:     Procedure Component Value Units Date/Time    EKG 12-lead [[id number]]     Order Status:  Sent Lab Status:  No result     EKG 12-lead [[id number]]     Order Status:  Sent Lab Status:  No result     Echo Color Flow Doppler? Yes [[id number]]  (Abnormal) Resulted:  06/05/20 1557    Order Status:  Sent Lab Status:  In process Updated:  06/05/20 1600     BSA 2.19 m2      TDI SEPTAL 0.09 m/s      LV LATERAL E/E' RATIO 7.60 m/s      LV SEPTAL E/E' RATIO 8.44 m/s      Right Atrial Pressure (from IVC) 8 mmHg      AORTIC VALVE CUSP SEPERATION 1.76 cm      TDI LATERAL 0.10 m/s      PV PEAK VELOCITY 90.44 cm/s      LVIDD 4.81 cm      IVS 1.11 cm      PW 1.11 cm      Ao root annulus 4.25 cm      LVIDS 3.41 cm      FS 29 %      LV mass 197.07 g      LA size 4.40 cm      RVDD 303.00 cm      Left Ventricle Relative Wall Thickness 0.46 cm      AV mean gradient 4 mmHg      AV valve area 3.31 cm2      AV Velocity Ratio 71.76     AV index (prosthetic) 0.78     E/A ratio 1.12     Mean e' 0.10 m/s      E wave decelartion time 203.05 msec      IVRT 41.44 msec       LVOT diameter 2.32 cm      LVOT area 4.2 cm2      LVOT peak lavell 94.72 m/s      LVOT peak VTI 22.59 cm      Ao peak lavell 1.32 m/s      Ao VTI 28.83 cm      LVOT stroke volume 95.45 cm3      AV peak gradient 7 mmHg      E/E' ratio 8.00 m/s      MV Peak E Lavell 0.76 m/s      MV Peak A Lavell 0.68 m/s      LV Systolic Volume 48.75 mL      LV Systolic Volume Index 22.6 mL/m2      LV Diastolic Volume 115.42 mL      LV Diastolic Volume Index 53.62 mL/m2      LV Mass Index 92 g/m2     Narrative:       · Concentric left ventricular remodeling.       Impression:           Troponin I [[id number]]     Order Status:  Sent Lab Status:  No result     Specimen:  Blood          Medications:  Reconciled Home Medications:      Medication List      CHANGE how you take these medications    amLODIPine 10 MG tablet  Commonly known as:  NORVASC  TAKE 1 TABLET(10 MG) BY MOUTH EVERY DAY  What changed:    · how much to take  · how to take this  · when to take this     BYSTOLIC 5 MG Tab  Generic drug:  nebivoloL  TK 1 T PO QD  What changed:    · how much to take  · how to take this  · when to take this     losartan 50 MG tablet  Commonly known as:  COZAAR  TAKE 1 TABLET(50 MG) BY MOUTH EVERY DAY  What changed:    · how much to take  · how to take this  · when to take this     rosuvastatin 20 MG tablet  Commonly known as:  CRESTOR  TAKE 1 TABLET(20 MG) BY MOUTH EVERY DAY  What changed:    · how much to take  · how to take this  · when to take this        CONTINUE taking these medications    aspirin 325 MG EC tablet  Commonly known as:  ECOTRIN  Take 325 mg by mouth once daily.     clopidogreL 75 mg tablet  Commonly known as:  PLAVIX  Take 1 tablet (75 mg total) by mouth once daily.     coenzyme Q10 10 mg capsule  Take 100 mg by mouth once daily.     etodolac 400 MG tablet  Commonly known as:  LODINE  Take 400 mg by mouth 2 (two) times daily.     fish oil-omega-3 fatty acids 300-1,000 mg capsule  Take 1,200 g by mouth once daily.     levothyroxine 125  MCG tablet  Commonly known as:  SYNTHROID  Take 125 mcg by mouth once daily.     ranolazine 1,000 mg Tb12  Commonly known as:  RANEXA  Take 1 tablet (1,000 mg total) by mouth 2 (two) times daily.     testosterone cypionate 200 mg/mL Kit  Inject 200 mg into the muscle every 14 (fourteen) days.            Indwelling Lines/Drains at time of discharge:   Lines/Drains/Airways     None                 Time spent on the discharge of patient: 24 minutes  Patient was seen and examined on the date of discharge and determined to be suitable for discharge.         Sana Steele MD  Department of Hospital Medicine  [facility]

## 2020-06-05 NOTE — HOSPITAL COURSE
During his very brief hospital stay patient was admitted for chest pain with both typical and atypical features.  Acute coronary syndrome was ruled out by serial EKGs and negative cardiac enzymes.  Patient was evaluated by Dr. PEDRO LUIS Cespedes who cleared him for discharge and recommended outpatient follow-up for further ischemic workup.  Upon my reassessment patient denies any active pain.  He was discharged home in hemodynamically stable condition without any new prescriptions as he is already taking dual antiplatelets, beta blocker and losartan along with statin and Ranexa.  He was advised to follow-up with Dr.C Cespedes or  for further workup.     Review of systems:  Comprehensive review of system negative    Physical Exam   Constitutional: He is oriented to person, place, and time. He appears well-developed and well-nourished.   Obese   HENT:   Head: Atraumatic.   Mouth/Throat: Oropharynx is clear and moist.   Eyes: Pupils are equal, round, and reactive to light. EOM are normal.   Neck: Neck supple. No JVD present.   Cardiovascular: Normal rate, regular rhythm and normal heart sounds. Exam reveals no gallop.   No murmur heard.  Pulmonary/Chest: Breath sounds normal. No respiratory distress. He has no wheezes.   Abdominal: Soft. Bowel sounds are normal. He exhibits no distension. There is no rebound and no guarding.   Musculoskeletal: He exhibits no edema.   Lymphadenopathy:   He has no cervical adenopathy.   Neurological: He is alert and oriented to person, place, and time. No cranial nerve deficit.   Skin: Skin is warm. Capillary refill takes less than 2 seconds. No rash noted.    Psychiatric: His behavior is normal.     Nursing note and vitals reviewed.

## 2020-06-05 NOTE — ED NOTES
Patient transported by wheelchair to floor, room was not ready. Instructed by Eduardo Supervisor, to bring patient back to ER until room cleaned.

## 2020-06-05 NOTE — ASSESSMENT & PLAN NOTE
Continue aspirin, Plavix, beta-blocker, statin  Serial EKGs, cardiac enzymes  Cardiology is consulted for further ischemic workup

## 2020-06-05 NOTE — ASSESSMENT & PLAN NOTE
Patient has both typical and atypical features including chest tightness, neck stiffness and pain radiating to his left arm and has significant prior cardiac history  Admit to telemetry for observation  Serial EKGs, cardiac enzymes, telemetry monitoring, 2D echo  Consult Dr. XIMENA Cespedes(patient saw him in the past)  Point of care EKG and cardiac enzymes did not show any signs of ischemia except might ST depressions in anterior leads  Of note patient has CKD 3  Resume home medications including aspirin and Plavix  Continue beta-blocker, statin  Will hold losartan in case he needs any angiographic evaluate  P.r.n. Morphine  Resume Ranexa  Patient says he cannot take him door due to severe headache

## 2020-06-05 NOTE — CONSULTS
[facility]  Department of Cardiology  Consult Note      Patient name: Preston Arzate  MRN: [id number]  Today's Date: 06/05/2020  Admit Date: 6/5/2020                          Consult Requested By: Sana Steele MD    SUBJECTIVE     Principal Problem: Angina pectoris      Reason for Consult: chest pain with hx of CAD    From H&P: 65-year-old gentleman with past medical history of CAD status post CABG, stenting, hypertension, hyperlipidemia, hypothyroidism presented with chief complaint of left arm and shoulder pain.  Upon further asking patient mentioned that he was in his usual state of health until today morning around 7:30 a.m. when he was brushing his teeth he started experiencing pressure-like pain involving his left shoulder, left neck radiating to his arm with some diaphoresis and dyspnea.  Considering his cardiac history he decided to come to ED.  He received aspirin and nitroglycerin and pain has resolved now.  Otherwise denies any recent changes in his medications, recent exertion or stress.  Otherwise denies any fever, chills, headache, dizziness, palpitations, nausea, vomiting, bladder or bowel symptoms.       History of Present Illness:    Mr. Arzate is a 65-year-old male who presented to the emergency room with left shoulder and neck pain that radiated down his left.  Patient chronically has some chest tightness which he reports has been present since he had a CABG about 15 years ago.  Patient has a long history of CAD and has had angiogram with PCI done since his CABG as well.  He is followed by Dr. Gutierres.     Patient reports that in the past when he has needed cardiac intervention he had pain in his back, not in his shoulder, arm, or neck.      Review of patient's allergies indicates:   Allergen Reactions    Imdur [isosorbide mononitrate]      headaches       Past Medical History:   Diagnosis Date    Coronary artery disease     Hypertension     Pure hypercholesterolemia  10/1/2018    Stroke 01/2018    amarousus fugax left    Thyroid disease      Past Surgical History:   Procedure Laterality Date    CARDIAC CATHETERIZATION  03/2015    x 2    CORONARY ANGIOPLASTY  12/15    GREGORIA X 2 left main and SVG to PDA    CORONARY ARTERY BYPASS GRAFT  07/2005    x 4    TOTAL THYROIDECTOMY       Social History     Tobacco Use    Smoking status: Never Smoker    Smokeless tobacco: Never Used   Substance Use Topics    Alcohol use: No    Drug use: Not on file        REVIEW OF SYSTEMS  Constitutional: Negative for chills, fatigue and fever.   Eyes: No double vision, No blurred vision  Neuro: No headaches, No dizziness  Respiratory: Negative for cough, shortness of breath and wheezing.    Cardiovascular: Positive for chest tightness with pain in neck and left arm and shoulder  Gastrointestinal: Negative for abdominal pain, No melena, diarrhea, nausea and vomiting.   Genitourinary: Negative for dysuria and frequency, Negative for hematuria  Skin: Negative for bruising, Negative for edema or discoloration noted.   Endocrine: Negative for polyphagia, Negative for heat intolerance, Negative for cold intolerance  Psychiatric: Negative for depression, Negative for anxiety, Negative for memory loss  Musculoskeletal: Negative for neck pain, Negative for muscle weakness, Negative for back pain     OBJECTIVE     Vital Signs (Most Recent)  Temp: 97.9 °F (36.6 °C) (06/05/20 0747)  Pulse: (!) 54 (06/05/20 1032)  Resp: 10 (06/05/20 1032)  BP: 126/73 (06/05/20 1030)  SpO2: 96 % (06/05/20 1032)    I & O (Last 24H):No intake or output data in the 24 hours ending 06/05/20 1251    WEIGHTS LAST 4 READINGS  Wt Readings from Last 4 Encounters:   06/05/20 97.5 kg (215 lb)   07/03/19 100.3 kg (221 lb 1.9 oz)   04/19/18 103.8 kg (228 lb 13.4 oz)   01/11/18 101.3 kg (223 lb 5.2 oz)       PHYSICAL EXAM  GENERAL: well built, well nourished, well-developed in no apparent distress alert and oriented.   HEENT: Normocephalic.  Pupils normal and conjunctivae normal.  Mucous membranes normal, no cyanosis or icterus, trachea central,no pallor or icterus is noted..   NECK: No JVD. No bruit..   CARDIAC: Regular rate and rhythm. S1 is normal.S2 is normal.No gallops, clicks or murmurs noted at this time.  CHEST ANATOMY: normal.   LUNGS: Clear to auscultation. No wheezing or rhonchi..    ABDOMEN: Soft no masses or organomegaly.  No abdomen pulsations or bruits.  Normal bowel sounds. No pulsations and no masses felt, No guarding or rebound.   URINARY: No nelson catheter   EXTREMITIES: No cyanosis, clubbing or edema noted at this time., no calf tenderness bilaterally.   PERIPHERAL VASCULAR SYSTEM: Good palpable distal pulses.   CENTRAL NERVOUS SYSTEM: No focal motor or sensory deficits noted.   SKIN: Skin without lesions, moist, well perfused.   MUSCLE STRENGTH & TONE: No noteable weakness, atrophy or abnormal movement.     Home Medications:  No current facility-administered medications on file prior to encounter.      Current Outpatient Medications on File Prior to Encounter   Medication Sig Dispense Refill    amLODIPine (NORVASC) 10 MG tablet TAKE 1 TABLET(10 MG) BY MOUTH EVERY DAY (Patient taking differently: Take 10 mg by mouth once daily. TAKE 1 TABLET(10 MG) BY MOUTH EVERY DAY) 90 tablet 3    aspirin (ECOTRIN) 325 MG EC tablet Take 325 mg by mouth once daily.      BYSTOLIC 5 mg Tab TK 1 T PO QD (Patient taking differently: Take 5 mg by mouth once daily. TK 1 T PO QD) 90 tablet 3    clopidogrel (PLAVIX) 75 mg tablet Take 1 tablet (75 mg total) by mouth once daily. 90 tablet 3    coenzyme Q10 10 mg capsule Take 100 mg by mouth once daily.      fish oil-omega-3 fatty acids 300-1,000 mg capsule Take 1,200 g by mouth once daily.      levothyroxine (SYNTHROID) 125 MCG tablet Take 125 mcg by mouth once daily.   3    losartan (COZAAR) 50 MG tablet TAKE 1 TABLET(50 MG) BY MOUTH EVERY DAY (Patient taking differently: Take 50 mg by mouth once  daily. TAKE 1 TABLET(50 MG) BY MOUTH EVERY DAY) 90 tablet 3    ranolazine (RANEXA) 1,000 mg Tb12 Take 1 tablet (1,000 mg total) by mouth 2 (two) times daily. 180 tablet 3    etodolac (LODINE) 400 MG tablet Take 400 mg by mouth 2 (two) times daily.      rosuvastatin (CRESTOR) 20 MG tablet TAKE 1 TABLET(20 MG) BY MOUTH EVERY DAY (Patient taking differently: Take 20 mg by mouth once daily. TAKE 1 TABLET(20 MG) BY MOUTH EVERY DAY) 90 tablet 3    testosterone cypionate 200 mg/mL Kit Inject 200 mg into the muscle every 14 (fourteen) days.      [DISCONTINUED] etodolac (LODINE) 400 MG tablet TK 1 T PO BID  3    [DISCONTINUED] testosterone cypionate (DEPOTESTOTERONE CYPIONATE) 200 mg/mL injection INJECT 1 ML INTO THE MUSCLE EVERY 2 WEEKS  0       Scheduled Meds:   amLODIPine  10 mg Oral Daily    [START ON 6/6/2020] aspirin  325 mg Oral Daily    clopidogreL  75 mg Oral Daily    [START ON 6/6/2020] levothyroxine  125 mcg Oral Daily    nebivoloL  5 mg Oral Daily    ranolazine  1,000 mg Oral BID    rosuvastatin  20 mg Oral QHS       Continuous Infusions:    PRN Meds:acetaminophen, HYDROcodone-acetaminophen, magnesium oxide, magnesium oxide, melatonin, morphine, potassium chloride 10%, potassium chloride 10%, potassium chloride 10%, potassium, sodium phosphates, potassium, sodium phosphates, potassium, sodium phosphates, sodium chloride 0.9%    LABS AND DIAGNOSTICS     CBC LAST 3 DAYS  Recent Labs   Lab 06/05/20  0809   WBC 7.71   RBC 5.23   HGB 14.5   HCT 45.2   MCV 86   MCH 27.7   MCHC 32.1   RDW 18.9*      MPV 10.7   GRAN 58.8  4.5   LYMPH 27.4  2.1   MONO 9.7  0.8   BASO 0.07   NRBC 0       COAGULATION LAST 3 DAYS  Recent Labs   Lab 06/05/20  0809   LABPT 12.6   INR 1.0       CHEMISTRY LAST 3 DAYS  Recent Labs   Lab 06/05/20  0809      K 3.9      CO2 24   ANIONGAP 12   BUN 17   CREATININE 1.7*      CALCIUM 9.2   MG 2.0   ALBUMIN 4.0   PROT 8.1   ALKPHOS 71   ALT 28   AST 20   BILITOT  0.7       CARDIAC PROFILE LAST 3 DAYS  Recent Labs   Lab 06/05/20  0809   TROPONINI <0.030       ENDOCRINE LAST 3 DAYS  No results for input(s): TSH, PROCAL in the last 168 hours.    LAST ARTERIAL BLOOD GAS  ABG  No results for input(s): PH, PO2, PCO2, HCO3, BE in the last 168 hours.    LAST 7 DAYS MICROBIOLOGY   Microbiology Results (last 7 days)     ** No results found for the last 168 hours. **          MOST RECENT IMAGING  X-ray Chest AP Portable  CLINICAL HISTORY:  65 years (1955) Male    TECHNIQUE:  Portable AP radiograph the chest.    COMPARISON:  Most recent radiograph from June 22, 2017    FINDINGS:  The lungs are clear. Costophrenic angles are seen without effusion. No  pneumothorax is identified. The heart is top normal in size. Median  sternotomy wires are unchanged. The mediastinum is within normal  limits. Osseous structures show degenerative disc disease and  degenerative changes in the shoulders. The visualized upper abdomen is  unremarkable.    IMPRESSION:  No acute cardiac or pulmonary process.    .    Electronically Signed by NEVA Engel on 6/5/2020 8:14 AM    Cardiac pet stress test 5/14/18:  CONCLUSIONS: ABNORMAL MYOCARDIAL PERFUSION PET STRESS TEST  1. There is a very small sized mild ischemia in the mid inferoseptal wall in the usual distribution of the Posterior Descending Artery. This defect comprises 5 % of the left ventricular myocardium.   2. There is a very small sized mild ischemia in the basilar lateral wall in the usual distribution of the Left Circumflex Artery. This defect comprises 5 % of the left ventricular myocardium.   3. Resting wall motion is physiologic. Stress wall motion is physiologic.   4. LV function is normal at rest and stress.  (normal is >= 51%)  5. The ventricular volumes are normal at rest and stress.   6. The extracardiac distribution of radioactivity is normal.   7. When compared to the previous study from 12/08/2015, the lateral wall has  significantly improved.  There is also a very small region of ischemia in the PDA territory.     ECHOCARDIOGRAM RESULTS (last 10)  Results for orders placed during the hospital encounter of 01/10/18   2D echo with color flow doppler    Narrative Date of Procedure: 01/10/2018        TEST DESCRIPTION   Technical Quality: This is a technically challenging study.     Aorta: The aortic root is mildly enlarged, measuring 3.6 cm at sinotubular junction and 4.1 cm at Sinuses of Valsalva. The proximal ascending aorta is mildly enlarged, measuring 4.4 cm across.     Left Atrium: The left atrial volume index is mildly enlarged, measuring 39.93 cc/m2.     Left Ventricle: The left ventricle is normal in size, with an end-diastolic diameter of 4.1 cm, and an end-systolic diameter of 2.5 cm. Septal wall thickness is mildly increased, and posterior wall thickness is upper limit of normal in size, with the   septum measuring 1.2 cm and the posterior wall measuring 1.1 cm across. Relative wall thickness was increased at 0.54, and the LV mass index was 84.0 g/m2 consistent with concentric remodeling. There are no regional wall motion abnormalities. Left   ventricular systolic function appears normal. Visually estimated ejection fraction is 60-65%. The LV Doppler derived stroke volume equals 85.0 ccs.     Diastolic indices: E wave velocity 1.0 m/s, E/A ratio 1.0,  msec., E/e' ratio(avg) 14. Diastolic function is indeterminate.     Right Atrium: The right atrium is normal in size, measuring 5.7 cm in length and 3.4 cm in width in the apical view.     Right Ventricle: The right ventricle is normal in size measuring 3.6 cm at the base in the apical right ventricle-focused view. Global right ventricular systolic function appears normal. Tricuspid annular plane systolic excursion (TAPSE) is 2.1 cm. The   estimated PA systolic pressure is 23 mmHg.     Aortic Valve:  Aortic valve is normal in structure with normal leaflet mobility.      Mitral Valve:  Mitral valve is normal in structure with normal leaflet mobility.     Tricuspid Valve:  There is mild tricuspid regurgitation. Tricuspid valve is normal in structure with normal leaflet mobility.     Pulmonary Valve:  The pulmonic valve is not well seen. There is mild pulmonic regurgitation.     IVC: IVC is normal in size and collapses > 50% with a sniff, suggesting normal right atrial pressure of 3 mmHg.     Intracavitary: There is no evidence of pericardial effusion, intracavity mass, thrombi, or vegetation.         CONCLUSIONS     1 - Normal left ventricular systolic function (EF 60-65%).     2 - No wall motion abnormalities.     3 - Concentric remodeling.     4 - Mild left atrial enlargement.     5 - Mildly enlarged ascending aorta.     6 - Indeterminate LV diastolic function.     7 - Normal right ventricular systolic function .     8 - The estimated PA systolic pressure is 23 mmHg.     9 - Mild tricuspid regurgitation.             This document has been electronically    SIGNED BY: Marcie Lambert MD On: 01/11/2018 08:29       CURRENT/PREVIOUS VISIT EKG  Results for orders placed or performed during the hospital encounter of 06/05/20   EKG 12-LEAD    Collection Time: 06/05/20  7:50 AM    Narrative    Test Reason : R07.9,    Vent. Rate : 063 BPM     Atrial Rate : 063 BPM     P-R Int : 188 ms          QRS Dur : 090 ms      QT Int : 420 ms       P-R-T Axes : 040 030 088 degrees     QTc Int : 429 ms    Normal sinus rhythm  Nonspecific ST and T wave abnormality  Abnormal ECG  When compared with ECG of 14-MAY-2018 10:26,  Previous ECG has undetermined rhythm, needs review  Inverted T waves have replaced nonspecific T wave abnormality in Anterior  leads    Referred By: [email or web address]   SELF           Confirmed By:          ASSESSMENT/PLAN:     Active Hospital Problems    Diagnosis    *Angina pectoris    Chest pain    CKD (chronic kidney disease) stage 3, GFR 30-59 ml/min    Pure  hypercholesterolemia    Hypothyroidism    S/P coronary artery stent placement    S/P CABG x 4    Essential hypertension       Assessment & Plan:     1. Chest pain  2. CAD with hx of CABG and PCI  3. CKD stage III  4. Essential HTN  5. Hyperlipidemia  6. Hypothyroidism     Recommendations:    1. Initial troponin level is negative but he does have some nonspecific ST-T wave changes noted.   2. Trend troponin levels.   3. 2D echocardiogram pending.   4.  If patient remains stable, would consider discharge with further testing done on outpatient basis.  5.  Will follow.  Thank you for the consultation.      [facility]  Department of Cardiology  Ann Dorman NP  Date of service: 06/05/2020

## 2020-06-05 NOTE — HPI
65-year-old gentleman with past medical history of CAD status post CABG, stenting, hypertension, hyperlipidemia, hypothyroidism presented with chief complaint of left arm and shoulder pain.  Upon further asking patient mentioned that he was in his usual state of health until today morning around 7:30 a.m. when he was brushing his teeth he started experiencing pressure-like pain involving his left shoulder, left neck radiating to his arm with some diaphoresis and dyspnea.  Considering his cardiac history he decided to come to ED.  He received aspirin and nitroglycerin and pain has resolved now.  Otherwise denies any recent changes in his medications, recent exertion or stress.  Otherwise denies any fever, chills, headache, dizziness, palpitations, nausea, vomiting, bladder or bowel symptoms.

## 2020-06-05 NOTE — ED PROVIDER NOTES
Encounter Date: 6/5/2020    SCRIBE #1 NOTE: I, am scribing for, and in the presence of. I have scribed the following portions of the note -       History     Chief Complaint   Patient presents with    Arm Pain     left arm pain into left shoulder denies chest pain but states is heart patient and did take an aspirin and NTG     Patient presents complaining of upper left chest pain and left arm pain.  Patient has a history of heart disease.  He has status post CABG and stents.  Patient states he is compliant with his medication.  He took an aspirin prior to arrival.  At present pain is relieved.  Nothing really makes it better or worse.  He describes it as a dull ache.        Review of patient's allergies indicates:   Allergen Reactions    Imdur [isosorbide mononitrate]      headaches     Past Medical History:   Diagnosis Date    Coronary artery disease     Hypertension     Pure hypercholesterolemia 10/1/2018    Stroke 01/2018    amarousus fugax left    Thyroid disease      Past Surgical History:   Procedure Laterality Date    CARDIAC CATHETERIZATION  03/2015    x 2    CORONARY ANGIOPLASTY  12/15    GREGORIA X 2 left main and SVG to PDA    CORONARY ARTERY BYPASS GRAFT  07/2005    x 4    TOTAL THYROIDECTOMY       Family History   Problem Relation Age of Onset    Hypertension Mother     Heart attack Father     Heart disease Sister     Hypertension Sister     Heart attack Paternal Grandfather      Social History     Tobacco Use    Smoking status: Never Smoker    Smokeless tobacco: Never Used   Substance Use Topics    Alcohol use: No    Drug use: Not on file     Review of Systems   Cardiovascular: Positive for chest pain.   All other systems reviewed and are negative.      Physical Exam     Initial Vitals [06/05/20 0747]   BP Pulse Resp Temp SpO2   127/80 64 18 97.9 °F (36.6 °C) 99 %      MAP       --         Physical Exam    Nursing note and vitals reviewed.  Constitutional: He appears well-developed and  well-nourished. He is not diaphoretic. No distress.   HENT:   Head: Normocephalic and atraumatic.   Mouth/Throat: Oropharynx is clear and moist.   Eyes: EOM are normal.   Neck: Normal range of motion. Neck supple.   Cardiovascular: Normal rate, regular rhythm, normal heart sounds and intact distal pulses.   Pulmonary/Chest: Breath sounds normal. No respiratory distress.   Abdominal: Soft.   Musculoskeletal: Normal range of motion.   Neurological: He is alert and oriented to person, place, and time. He has normal strength.   Vision-normal  Neglect-normal  Aphasia - normal  Pronator drift - normal  Cerebellum - normal   Skin: Skin is warm and dry.   Psychiatric: He has a normal mood and affect. His behavior is normal. Judgment and thought content normal.         ED Course   Procedures  Labs Reviewed   COMPREHENSIVE METABOLIC PANEL - Abnormal; Notable for the following components:       Result Value    Creatinine 1.7 (*)     eGFR if  47.9 (*)     eGFR if non  41.4 (*)     All other components within normal limits   CBC W/ AUTO DIFFERENTIAL - Abnormal; Notable for the following components:    RDW 18.9 (*)     Immature Granulocytes 0.6 (*)     Immature Grans (Abs) 0.05 (*)     All other components within normal limits   MAGNESIUM   TROPONIN I   PROTIME-INR   SARS-COV-2 RNA AMPLIFICATION, QUAL     EKG Readings: (Independently Interpreted)   EKG is normal sinus rhythm, regular rate, T-wave inversion in septal leads     ECG Results          EKG 12-LEAD (In process)  Result time 06/05/20 08:21:09    In process by Interface, Lab In [facility] (06/05/20 08:21:09)                 Narrative:    Test Reason : R07.9,    Vent. Rate : 063 BPM     Atrial Rate : 063 BPM     P-R Int : 188 ms          QRS Dur : 090 ms      QT Int : 420 ms       P-R-T Axes : 040 030 088 degrees     QTc Int : 429 ms    Normal sinus rhythm  Nonspecific ST and T wave abnormality  Abnormal ECG  When compared with ECG of  14-MAY-2018 10:26,  Previous ECG has undetermined rhythm, needs review  Inverted T waves have replaced nonspecific T wave abnormality in Anterior  leads    Referred By: [email or web address]   SELF           Confirmed By:                             Imaging Results          X-ray Chest AP Portable (Final result)  Result time 06/05/20 08:12:14    Final result by Samir Fonseca MD (06/05/20 08:12:14)                 Narrative:    CLINICAL HISTORY:  65 years (1955) Male    TECHNIQUE:  Portable AP radiograph the chest.    COMPARISON:  Most recent radiograph from June 22, 2017    FINDINGS:  The lungs are clear. Costophrenic angles are seen without effusion. No  pneumothorax is identified. The heart is top normal in size. Median  sternotomy wires are unchanged. The mediastinum is within normal  limits. Osseous structures show degenerative disc disease and  degenerative changes in the shoulders. The visualized upper abdomen is  unremarkable.    IMPRESSION:  No acute cardiac or pulmonary process.                  .            Electronically Signed by NEVA Engel on 6/5/2020 8:14 AM                               Medical Decision Making:   ED Management:  Patient with left arm pain in upper chest pain with history of heart disease initial screening EKG with T-wave inversion.  Patient chest pain-free.  Troponin negative.  Patient requires admission secondary to high risk for acute coronary syndrome.  He remains clinically stable without chest pain at this time.                                 Clinical Impression:       ICD-10-CM ICD-9-CM   1. Chest pain R07.9 786.50             ED Disposition Condition    Admit                           Mark Rivera MD  06/05/20 [id number]

## 2020-06-05 NOTE — H&P
[facility] Medicine  History & Physical    DOS: 06/05/2020 at 10: 02am    Patient Name: Preston Arzate  MRN: [id number]  Admission Date: 6/5/2020  Attending Physician: Mark Rivera MD   Primary Care Provider: Arnaldo Smiley II, MD         Patient information was obtained from patient and ER records.     Subjective:     Principal Problem:Angina pectoris    Chief Complaint:   Chief Complaint   Patient presents with    Arm Pain     left arm pain into left shoulder denies chest pain but states is heart patient and did take an aspirin and NTG        HPI: 65-year-old gentleman with past medical history of CAD status post CABG, stenting, hypertension, hyperlipidemia, hypothyroidism presented with chief complaint of left arm and shoulder pain.  Upon further asking patient mentioned that he was in his usual state of health until today morning around 7:30 a.m. when he was brushing his teeth he started experiencing pressure-like pain involving his left shoulder, left neck radiating to his arm with some diaphoresis and dyspnea.  Considering his cardiac history he decided to come to ED.  He received aspirin and nitroglycerin and pain has resolved now.  Otherwise denies any recent changes in his medications, recent exertion or stress.  Otherwise denies any fever, chills, headache, dizziness, palpitations, nausea, vomiting, bladder or bowel symptoms.     Past Medical History:   Diagnosis Date    Coronary artery disease     Hypertension     Pure hypercholesterolemia 10/1/2018    Stroke 01/2018    amarousus fugax left    Thyroid disease        Past Surgical History:   Procedure Laterality Date    CARDIAC CATHETERIZATION  03/2015    x 2    CORONARY ANGIOPLASTY  12/15    GREGORIA X 2 left main and SVG to PDA    CORONARY ARTERY BYPASS GRAFT  07/2005    x 4    TOTAL THYROIDECTOMY         Review of patient's allergies indicates:   Allergen Reactions    Imdur [isosorbide mononitrate]      headaches        No current facility-administered medications on file prior to encounter.      Current Outpatient Medications on File Prior to Encounter   Medication Sig    amLODIPine (NORVASC) 10 MG tablet TAKE 1 TABLET(10 MG) BY MOUTH EVERY DAY (Patient taking differently: Take 10 mg by mouth once daily. TAKE 1 TABLET(10 MG) BY MOUTH EVERY DAY)    aspirin (ECOTRIN) 325 MG EC tablet Take 325 mg by mouth once daily.    BYSTOLIC 5 mg Tab TK 1 T PO QD (Patient taking differently: Take 5 mg by mouth once daily. TK 1 T PO QD)    clopidogrel (PLAVIX) 75 mg tablet Take 1 tablet (75 mg total) by mouth once daily.    coenzyme Q10 10 mg capsule Take 100 mg by mouth once daily.    fish oil-omega-3 fatty acids 300-1,000 mg capsule Take 1,200 g by mouth once daily.    levothyroxine (SYNTHROID) 125 MCG tablet Take 125 mcg by mouth once daily.     losartan (COZAAR) 50 MG tablet TAKE 1 TABLET(50 MG) BY MOUTH EVERY DAY (Patient taking differently: Take 50 mg by mouth once daily. TAKE 1 TABLET(50 MG) BY MOUTH EVERY DAY)    ranolazine (RANEXA) 1,000 mg Tb12 Take 1 tablet (1,000 mg total) by mouth 2 (two) times daily.    etodolac (LODINE) 400 MG tablet Take 400 mg by mouth 2 (two) times daily.    rosuvastatin (CRESTOR) 20 MG tablet TAKE 1 TABLET(20 MG) BY MOUTH EVERY DAY (Patient taking differently: Take 20 mg by mouth once daily. TAKE 1 TABLET(20 MG) BY MOUTH EVERY DAY)    testosterone cypionate 200 mg/mL Kit Inject 200 mg into the muscle every 14 (fourteen) days.    [DISCONTINUED] etodolac (LODINE) 400 MG tablet TK 1 T PO BID    [DISCONTINUED] testosterone cypionate (DEPOTESTOTERONE CYPIONATE) 200 mg/mL injection INJECT 1 ML INTO THE MUSCLE EVERY 2 WEEKS     Family History     Problem Relation (Age of Onset)    Heart attack Father, Paternal Grandfather    Heart disease Sister    Hypertension Mother, Sister        Tobacco Use    Smoking status: Never Smoker    Smokeless tobacco: Never Used   Substance and Sexual Activity     Alcohol use: No    Drug use: Not on file    Sexual activity: Not on file     Review of Systems   Constitutional: Positive for diaphoresis. Negative for activity change and appetite change.   HENT: Negative for congestion and sore throat.    Eyes: Negative for discharge and visual disturbance.   Respiratory: Negative for cough and chest tightness.    Cardiovascular: Positive for chest pain. Negative for leg swelling.   Gastrointestinal: Negative for abdominal pain and nausea.   Genitourinary: Negative for dysuria and hematuria.   Musculoskeletal: Positive for neck pain and neck stiffness. Negative for myalgias.   Skin: Negative for pallor and rash.   Neurological: Negative for dizziness and weakness.   Psychiatric/Behavioral: Negative for behavioral problems. The patient is not nervous/anxious.    All other systems reviewed and are negative.    Objective:     Vital Signs (Most Recent):  Temp: 97.9 °F (36.6 °C) (06/05/20 0747)  Pulse: (!) 54 (06/05/20 1032)  Resp: 10 (06/05/20 1032)  BP: 126/73 (06/05/20 1030)  SpO2: 96 % (06/05/20 1032) Vital Signs (24h Range):  Temp:  [97.9 °F (36.6 °C)] 97.9 °F (36.6 °C)  Pulse:  [54-69] 54  Resp:  [8-20] 10  SpO2:  [96 %-99 %] 96 %  BP: (126-143)/(72-85) 126/73     Weight: 97.5 kg (215 lb)  Body mass index is 30.85 kg/m².    Physical Exam   Constitutional: He is oriented to person, place, and time. He appears well-developed and well-nourished.   Obese   HENT:   Head: Atraumatic.   Mouth/Throat: Oropharynx is clear and moist.   Eyes: Pupils are equal, round, and reactive to light. EOM are normal.   Neck: Neck supple. No JVD present.   Cardiovascular: Normal rate, regular rhythm and normal heart sounds. Exam reveals no gallop.   No murmur heard.  Pulmonary/Chest: Breath sounds normal. No respiratory distress. He has no wheezes.   Abdominal: Soft. Bowel sounds are normal. He exhibits no distension. There is no rebound and no guarding.   Musculoskeletal: He exhibits no edema.    Lymphadenopathy:     He has no cervical adenopathy.   Neurological: He is alert and oriented to person, place, and time. No cranial nerve deficit.   Skin: Skin is warm. Capillary refill takes less than 2 seconds. No rash noted. He is diaphoretic.   Psychiatric: His behavior is normal.   Nursing note and vitals reviewed.        CRANIAL NERVES     CN III, IV, VI   Pupils are equal, round, and reactive to light.  Extraocular motions are normal.        Significant Labs: All pertinent labs within the past 24 hours have been reviewed.    Significant Imaging: I have reviewed all pertinent imaging results/findings within the past 24 hours.     Last cardiac PET scan in 05/ 2018 as below  CONCLUSIONS: ABNORMAL MYOCARDIAL PERFUSION PET STRESS TEST  1. There is a very small sized mild ischemia in the mid inferoseptal wall in the usual distribution of the Posterior Descending Artery. This defect comprises 5 % of the left ventricular myocardium.   2. There is a very small sized mild ischemia in the basilar lateral wall in the usual distribution of the Left Circumflex Artery. This defect comprises 5 % of the left ventricular myocardium.   3. Resting wall motion is physiologic. Stress wall motion is physiologic.   4. LV function is normal at rest and stress.  (normal is >= 51%)  5. The ventricular volumes are normal at rest and stress.   6. The extracardiac distribution of radioactivity is normal.   7. When compared to the previous study from 12/08/2015, the lateral wall has significantly improved.  There is also a very small region of ischemia in the PDA territory.     Last 2D echo in 01/2018    CONCLUSIONS     1 - Normal left ventricular systolic function (EF 60-65%).     2 - No wall motion abnormalities.     3 - Concentric remodeling.     4 - Mild left atrial enlargement.     5 - Mildly enlarged ascending aorta.     6 - Indeterminate LV diastolic function.     7 - Normal right ventricular systolic function .     8 - The  estimated PA systolic pressure is 23 mmHg.     9 - Mild tricuspid regurgitation.     Assessment/Plan:     * Angina pectoris  Patient has both typical and atypical features including chest tightness, neck stiffness and pain radiating to his left arm and has significant prior cardiac history  Admit to telemetry for observation  Serial EKGs, cardiac enzymes, telemetry monitoring, 2D echo  Consult Dr. XIMENA Cespedes(patient saw him in the past)  Point of care EKG and cardiac enzymes did not show any signs of ischemia except might ST depressions in anterior leads  Of note patient has CKD 3  Resume home medications including aspirin and Plavix  Continue beta-blocker, statin  Will hold losartan in case he needs any angiographic evaluate  P.r.n. Morphine  Resume Ranexa  Patient says he cannot take him door due to severe headache      CKD (chronic kidney disease) stage 3, GFR 30-59 ml/min  At baseline  Hold losartan    S/P CABG x 4  Stable      S/P coronary artery stent placement  Continue aspirin, Plavix, beta-blocker, statin  Serial EKGs, cardiac enzymes  Cardiology is consulted for further ischemic workup      Essential hypertension  Resume all home medications except losartan  P.r.n. Hydralazine/labetalol      Pure hypercholesterolemia  Statin      Hypothyroidism  Resume home meds        VTE Risk Mitigation (From admission, onward)         Ordered     Reason for No Pharmacological VTE Prophylaxis  Once     Question:  Reasons:  Answer:  Already adequately anticoagulated on oral Anticoagulants    06/05/20 1035     IP VTE HIGH RISK PATIENT  Once      06/05/20 1035     Place sequential compression device  Until discontinued      06/05/20 1035                   Sana Steele MD  Department of Hospital Medicine   [facility]

## 2020-06-05 NOTE — SUBJECTIVE & OBJECTIVE
Past Medical History:   Diagnosis Date    Coronary artery disease     Hypertension     Pure hypercholesterolemia 10/1/2018    Stroke 01/2018    amarousus fugax left    Thyroid disease        Past Surgical History:   Procedure Laterality Date    CARDIAC CATHETERIZATION  03/2015    x 2    CORONARY ANGIOPLASTY  12/15    GREGORIA X 2 left main and SVG to PDA    CORONARY ARTERY BYPASS GRAFT  07/2005    x 4    TOTAL THYROIDECTOMY         Review of patient's allergies indicates:   Allergen Reactions    Imdur [isosorbide mononitrate]      headaches       No current facility-administered medications on file prior to encounter.      Current Outpatient Medications on File Prior to Encounter   Medication Sig    amLODIPine (NORVASC) 10 MG tablet TAKE 1 TABLET(10 MG) BY MOUTH EVERY DAY (Patient taking differently: Take 10 mg by mouth once daily. TAKE 1 TABLET(10 MG) BY MOUTH EVERY DAY)    aspirin (ECOTRIN) 325 MG EC tablet Take 325 mg by mouth once daily.    BYSTOLIC 5 mg Tab TK 1 T PO QD (Patient taking differently: Take 5 mg by mouth once daily. TK 1 T PO QD)    clopidogrel (PLAVIX) 75 mg tablet Take 1 tablet (75 mg total) by mouth once daily.    coenzyme Q10 10 mg capsule Take 100 mg by mouth once daily.    fish oil-omega-3 fatty acids 300-1,000 mg capsule Take 1,200 g by mouth once daily.    levothyroxine (SYNTHROID) 125 MCG tablet Take 125 mcg by mouth once daily.     losartan (COZAAR) 50 MG tablet TAKE 1 TABLET(50 MG) BY MOUTH EVERY DAY (Patient taking differently: Take 50 mg by mouth once daily. TAKE 1 TABLET(50 MG) BY MOUTH EVERY DAY)    ranolazine (RANEXA) 1,000 mg Tb12 Take 1 tablet (1,000 mg total) by mouth 2 (two) times daily.    etodolac (LODINE) 400 MG tablet Take 400 mg by mouth 2 (two) times daily.    rosuvastatin (CRESTOR) 20 MG tablet TAKE 1 TABLET(20 MG) BY MOUTH EVERY DAY (Patient taking differently: Take 20 mg by mouth once daily. TAKE 1 TABLET(20 MG) BY MOUTH EVERY DAY)    testosterone  cypionate 200 mg/mL Kit Inject 200 mg into the muscle every 14 (fourteen) days.    [DISCONTINUED] etodolac (LODINE) 400 MG tablet TK 1 T PO BID    [DISCONTINUED] testosterone cypionate (DEPOTESTOTERONE CYPIONATE) 200 mg/mL injection INJECT 1 ML INTO THE MUSCLE EVERY 2 WEEKS     Family History     Problem Relation (Age of Onset)    Heart attack Father, Paternal Grandfather    Heart disease Sister    Hypertension Mother, Sister        Tobacco Use    Smoking status: Never Smoker    Smokeless tobacco: Never Used   Substance and Sexual Activity    Alcohol use: No    Drug use: Not on file    Sexual activity: Not on file     Review of Systems   Constitutional: Positive for diaphoresis. Negative for activity change and appetite change.   HENT: Negative for congestion and sore throat.    Eyes: Negative for discharge and visual disturbance.   Respiratory: Negative for cough and chest tightness.    Cardiovascular: Positive for chest pain. Negative for leg swelling.   Gastrointestinal: Negative for abdominal pain and nausea.   Genitourinary: Negative for dysuria and hematuria.   Musculoskeletal: Positive for neck pain and neck stiffness. Negative for myalgias.   Skin: Negative for pallor and rash.   Neurological: Negative for dizziness and weakness.   Psychiatric/Behavioral: Negative for behavioral problems. The patient is not nervous/anxious.    All other systems reviewed and are negative.    Objective:     Vital Signs (Most Recent):  Temp: 97.9 °F (36.6 °C) (06/05/20 0747)  Pulse: (!) 54 (06/05/20 1032)  Resp: 10 (06/05/20 1032)  BP: 126/73 (06/05/20 1030)  SpO2: 96 % (06/05/20 1032) Vital Signs (24h Range):  Temp:  [97.9 °F (36.6 °C)] 97.9 °F (36.6 °C)  Pulse:  [54-69] 54  Resp:  [8-20] 10  SpO2:  [96 %-99 %] 96 %  BP: (126-143)/(72-85) 126/73     Weight: 97.5 kg (215 lb)  Body mass index is 30.85 kg/m².    Physical Exam   Constitutional: He is oriented to person, place, and time. He appears well-developed and  well-nourished.   Obese   HENT:   Head: Atraumatic.   Mouth/Throat: Oropharynx is clear and moist.   Eyes: Pupils are equal, round, and reactive to light. EOM are normal.   Neck: Neck supple. No JVD present.   Cardiovascular: Normal rate, regular rhythm and normal heart sounds. Exam reveals no gallop.   No murmur heard.  Pulmonary/Chest: Breath sounds normal. No respiratory distress. He has no wheezes.   Abdominal: Soft. Bowel sounds are normal. He exhibits no distension. There is no rebound and no guarding.   Musculoskeletal: He exhibits no edema.   Lymphadenopathy:     He has no cervical adenopathy.   Neurological: He is alert and oriented to person, place, and time. No cranial nerve deficit.   Skin: Skin is warm. Capillary refill takes less than 2 seconds. No rash noted. He is diaphoretic.   Psychiatric: His behavior is normal.   Nursing note and vitals reviewed.        CRANIAL NERVES     CN III, IV, VI   Pupils are equal, round, and reactive to light.  Extraocular motions are normal.        Significant Labs: All pertinent labs within the past 24 hours have been reviewed.    Significant Imaging: I have reviewed all pertinent imaging results/findings within the past 24 hours.     Last cardiac PET scan in 05/ 2018 as below  CONCLUSIONS: ABNORMAL MYOCARDIAL PERFUSION PET STRESS TEST  1. There is a very small sized mild ischemia in the mid inferoseptal wall in the usual distribution of the Posterior Descending Artery. This defect comprises 5 % of the left ventricular myocardium.   2. There is a very small sized mild ischemia in the basilar lateral wall in the usual distribution of the Left Circumflex Artery. This defect comprises 5 % of the left ventricular myocardium.   3. Resting wall motion is physiologic. Stress wall motion is physiologic.   4. LV function is normal at rest and stress.  (normal is >= 51%)  5. The ventricular volumes are normal at rest and stress.   6. The extracardiac distribution of  radioactivity is normal.   7. When compared to the previous study from 12/08/2015, the lateral wall has significantly improved.  There is also a very small region of ischemia in the PDA territory.     Last 2D echo in 01/2018    CONCLUSIONS     1 - Normal left ventricular systolic function (EF 60-65%).     2 - No wall motion abnormalities.     3 - Concentric remodeling.     4 - Mild left atrial enlargement.     5 - Mildly enlarged ascending aorta.     6 - Indeterminate LV diastolic function.     7 - Normal right ventricular systolic function .     8 - The estimated PA systolic pressure is 23 mmHg.     9 - Mild tricuspid regurgitation.

## 2020-06-06 LAB
AORTIC ROOT ANNULUS: 3.9 CM
AORTIC VALVE CUSP SEPERATION: 1.76 CM
AV INDEX (PROSTH): 0.78
AV MEAN GRADIENT: 4 MMHG
AV PEAK GRADIENT: 7 MMHG
AV VALVE AREA: 3.31 CM2
AV VELOCITY RATIO: 71.76
BSA FOR ECHO PROCEDURE: 2.19 M2
CV ECHO LV RWT: 0.46 CM
DOP CALC AO PEAK VEL: 1.32 M/S
DOP CALC AO VTI: 28.83 CM
DOP CALC LVOT AREA: 4.2 CM2
DOP CALC LVOT DIAMETER: 2.32 CM
DOP CALC LVOT PEAK VEL: 94.72 M/S
DOP CALC LVOT STROKE VOLUME: 95.45 CM3
DOP CALCLVOT PEAK VEL VTI: 22.59 CM
E WAVE DECELERATION TIME: 203.05 MSEC
E/A RATIO: 1.12
E/E' RATIO: 8 M/S
ECHO LV POSTERIOR WALL: 1.11 CM (ref 0.6–1.1)
FRACTIONAL SHORTENING: 29 % (ref 28–44)
INTERVENTRICULAR SEPTUM: 1.11 CM (ref 0.6–1.1)
IVRT: 41.44 MSEC
LEFT ATRIUM SIZE: 4.4 CM
LEFT INTERNAL DIMENSION IN SYSTOLE: 3.41 CM (ref 2.1–4)
LEFT VENTRICLE DIASTOLIC VOLUME INDEX: 53.62 ML/M2
LEFT VENTRICLE DIASTOLIC VOLUME: 115.42 ML
LEFT VENTRICLE MASS INDEX: 92 G/M2
LEFT VENTRICLE SYSTOLIC VOLUME INDEX: 22.6 ML/M2
LEFT VENTRICLE SYSTOLIC VOLUME: 48.75 ML
LEFT VENTRICULAR INTERNAL DIMENSION IN DIASTOLE: 4.81 CM (ref 3.5–6)
LEFT VENTRICULAR MASS: 197.07 G
LV LATERAL E/E' RATIO: 7.6 M/S
LV SEPTAL E/E' RATIO: 8.44 M/S
MV PEAK A VEL: 0.68 M/S
MV PEAK E VEL: 0.76 M/S
PV PEAK VELOCITY: 90.44 CM/S
RA PRESSURE: 8 MMHG
RIGHT VENTRICULAR END-DIASTOLIC DIMENSION: 303 CM
TDI LATERAL: 0.1 M/S
TDI SEPTAL: 0.09 M/S
TDI: 0.1 M/S

## 2020-06-06 NOTE — NURSING
Went over discharge information with pt, answered questions he had at that time, removed one PIV without difficulty and tip intact. Left with personal belongings: clothing and iphone. Patient refused wheelchair, walked patient out where he drove himself home.

## 2020-06-11 ENCOUNTER — TELEPHONE (OUTPATIENT)
Dept: CARDIOLOGY | Facility: CLINIC | Age: 65
End: 2020-06-11

## 2020-06-11 DIAGNOSIS — Z95.1 S/P CABG X 4: ICD-10-CM

## 2020-06-11 DIAGNOSIS — I25.119 CORONARY ARTERY DISEASE INVOLVING NATIVE CORONARY ARTERY OF NATIVE HEART WITH ANGINA PECTORIS: Primary | ICD-10-CM

## 2020-06-11 DIAGNOSIS — R07.89 OTHER CHEST PAIN: ICD-10-CM

## 2020-06-11 NOTE — TELEPHONE ENCOUNTER
Recent hospitalization with left shoulder arm pain with recurrent symptoms.  Please schedule him for a pet stress and then follow up with me.

## 2020-06-18 ENCOUNTER — TELEPHONE (OUTPATIENT)
Dept: CARDIOLOGY | Facility: CLINIC | Age: 65
End: 2020-06-18

## 2020-06-22 ENCOUNTER — CLINICAL SUPPORT (OUTPATIENT)
Dept: CARDIOLOGY | Facility: CLINIC | Age: 65
End: 2020-06-22
Attending: INTERNAL MEDICINE
Payer: COMMERCIAL

## 2020-06-22 VITALS — WEIGHT: 223 LBS | HEIGHT: 70 IN | BODY MASS INDEX: 31.92 KG/M2

## 2020-06-22 DIAGNOSIS — Z95.1 S/P CABG X 4: ICD-10-CM

## 2020-06-22 DIAGNOSIS — R07.89 OTHER CHEST PAIN: ICD-10-CM

## 2020-06-22 DIAGNOSIS — I25.119 CORONARY ARTERY DISEASE INVOLVING NATIVE CORONARY ARTERY OF NATIVE HEART WITH ANGINA PECTORIS: ICD-10-CM

## 2020-06-22 LAB
% MYOCARDIUM- DEFECT 1: 6 %
CFR FLOW - ANTERIOR: 2.1
CFR FLOW - INFERIOR: 1.66
CFR FLOW - LATERAL: 1.72
CFR FLOW - MAX: 2.52
CFR FLOW - MIN: 1.3
CFR FLOW - SEPTAL: 2.05
CFR FLOW - WHOLE HEART: 1.88
CFR FLOW- DEFECT 1: 1.46 CC/MIN/G
CV PHARM DOSE: 56.8 MG
CV STRESS BASE HR: 60 BPM
DIASTOLIC BLOOD PRESSURE: 78 MMHG
END DIASTOLIC INDEX-HIGH: 170 ML/M2
END SYSTOLIC INDEX-HIGH: 70 ML/M2
NUC REST DIASTOLIC VOLUME INDEX: 78
NUC REST EJECTION FRACTION: 74
NUC REST SYSTOLIC VOLUME INDEX: 20
NUC STRESS DIASTOLIC VOLUME INDEX: 98
NUC STRESS EJECTION FRACTION: 75 %
NUC STRESS SYSTOLIC VOLUME INDEX: 25
OHS CV CPX 85 PERCENT MAX PREDICTED HEART RATE MALE: 132
OHS CV CPX MAX PREDICTED HEART RATE: 155
OHS CV CPX PATIENT IS FEMALE: 0
OHS CV CPX PATIENT IS MALE: 1
OHS CV CPX PEAK DIASTOLIC BLOOD PRESSURE: 64 MMHG
OHS CV CPX PEAK HEAR RATE: 75 BPM
OHS CV CPX PEAK RATE PRESSURE PRODUCT: 7575
OHS CV CPX PEAK SYSTOLIC BLOOD PRESSURE: 101 MMHG
OHS CV CPX PERCENT MAX PREDICTED HEART RATE ACHIEVED: 48
OHS CV CPX RATE PRESSURE PRODUCT PRESENTING: 6780
REST FLOW - ANTERIOR: 0.84 CC/MIN/G
REST FLOW - INFERIOR: 0.64 CC/MIN/G
REST FLOW - LATERAL: 0.84 CC/MIN/G
REST FLOW - MAX: 0.99 CC/MIN/G
REST FLOW - MIN: 0.51 CC/MIN/G
REST FLOW - SEPTAL: 0.66 CC/MIN/G
REST FLOW - WHOLE HEART: 0.75 CC/MIN/G
REST FLOW- DEFECT 1: 0.6 CC/MIN/G
RETIRED EF AND QEF - SEE NOTES: 51 %
STRESS FLOW - ANTERIOR: 1.75 CC/MIN/G
STRESS FLOW - INFERIOR: 1.07 CC/MIN/G
STRESS FLOW - LATERAL: 1.44 CC/MIN/G
STRESS FLOW - MAX: 2.11 CC/MIN/G
STRESS FLOW - MIN: 0.81 CC/MIN/G
STRESS FLOW - SEPTAL: 1.35 CC/MIN/G
STRESS FLOW - WHOLE HEART: 1.4 CC/MIN/G
STRESS FLOW- DEFECT 1: 0.88 CC/MIN/G
STRESS ST DEPRESSION: 1 MM
SYSTOLIC BLOOD PRESSURE: 113 MMHG

## 2020-06-22 PROCEDURE — 78492 MYOCRD IMG PET MLT RST&STRS: CPT | Mod: S$GLB,,, | Performed by: INTERNAL MEDICINE

## 2020-06-22 PROCEDURE — 93015 CARDIAC PET SCAN STRESS (CUPID ONLY): ICD-10-PCS | Mod: S$GLB,,, | Performed by: INTERNAL MEDICINE

## 2020-06-22 PROCEDURE — 93015 CV STRESS TEST SUPVJ I&R: CPT | Mod: S$GLB,,, | Performed by: INTERNAL MEDICINE

## 2020-06-22 PROCEDURE — 78492 CARDIAC PET SCAN STRESS (CUPID ONLY): ICD-10-PCS | Mod: S$GLB,,, | Performed by: INTERNAL MEDICINE

## 2020-06-22 PROCEDURE — 99999 PR PBB SHADOW E&M-EST. PATIENT-LVL I: CPT | Mod: PBBFAC,,,

## 2020-06-22 PROCEDURE — A9555 CARDIAC PET SCAN STRESS (CUPID ONLY): ICD-10-PCS | Mod: S$GLB,,, | Performed by: INTERNAL MEDICINE

## 2020-06-22 PROCEDURE — 99999 PR PBB SHADOW E&M-EST. PATIENT-LVL I: ICD-10-PCS | Mod: PBBFAC,,,

## 2020-06-22 PROCEDURE — A9555 RB82 RUBIDIUM: HCPCS | Mod: S$GLB,,, | Performed by: INTERNAL MEDICINE

## 2020-06-22 RX ORDER — DIPYRIDAMOLE 5 MG/ML
56.76 INJECTION INTRAVENOUS
Status: COMPLETED | OUTPATIENT
Start: 2020-06-22 | End: 2020-06-22

## 2020-06-22 RX ADMIN — DIPYRIDAMOLE 56.75 MG: 5 INJECTION INTRAVENOUS at 02:06

## 2020-09-10 RX ORDER — CLOPIDOGREL BISULFATE 75 MG/1
TABLET ORAL
Qty: 90 TABLET | Refills: 3 | Status: SHIPPED | OUTPATIENT
Start: 2020-09-10 | End: 2020-10-20 | Stop reason: SDUPTHER

## 2021-07-13 RX ORDER — RANOLAZINE 1000 MG/1
TABLET, EXTENDED RELEASE ORAL
Qty: 180 TABLET | Refills: 3 | OUTPATIENT
Start: 2021-07-13

## 2021-07-25 ENCOUNTER — PATIENT MESSAGE (OUTPATIENT)
Dept: CARDIOLOGY | Facility: CLINIC | Age: 66
End: 2021-07-25

## 2021-11-01 RX ORDER — LOSARTAN POTASSIUM 50 MG/1
TABLET ORAL
Qty: 90 TABLET | Refills: 3 | OUTPATIENT
Start: 2021-11-01

## 2021-12-10 ENCOUNTER — PATIENT MESSAGE (OUTPATIENT)
Dept: CARDIOLOGY | Facility: CLINIC | Age: 66
End: 2021-12-10
Payer: COMMERCIAL

## 2021-12-13 DIAGNOSIS — E78.00 PURE HYPERCHOLESTEROLEMIA: ICD-10-CM

## 2021-12-13 DIAGNOSIS — I77.89 AORTIC ROOT ENLARGEMENT: ICD-10-CM

## 2021-12-13 DIAGNOSIS — I25.119 CORONARY ARTERY DISEASE INVOLVING NATIVE CORONARY ARTERY OF NATIVE HEART WITH ANGINA PECTORIS: Primary | ICD-10-CM

## 2021-12-13 DIAGNOSIS — Z95.5 S/P CORONARY ARTERY STENT PLACEMENT: ICD-10-CM

## 2021-12-14 ENCOUNTER — TELEPHONE (OUTPATIENT)
Dept: CARDIOLOGY | Facility: CLINIC | Age: 66
End: 2021-12-14
Payer: COMMERCIAL

## 2021-12-15 ENCOUNTER — HOSPITAL ENCOUNTER (OUTPATIENT)
Dept: CARDIOLOGY | Facility: CLINIC | Age: 66
Discharge: HOME | End: 2021-12-15
Payer: COMMERCIAL

## 2021-12-15 ENCOUNTER — OFFICE VISIT (OUTPATIENT)
Dept: CARDIOLOGY | Facility: CLINIC | Age: 66
End: 2021-12-15
Payer: COMMERCIAL

## 2021-12-15 VITALS
HEART RATE: 63 BPM | HEIGHT: 70 IN | WEIGHT: 225.5 LBS | OXYGEN SATURATION: 98 % | BODY MASS INDEX: 32.28 KG/M2 | DIASTOLIC BLOOD PRESSURE: 75 MMHG | SYSTOLIC BLOOD PRESSURE: 126 MMHG

## 2021-12-15 DIAGNOSIS — Z95.1 S/P CABG X 4: ICD-10-CM

## 2021-12-15 DIAGNOSIS — R53.83 FATIGUE, UNSPECIFIED TYPE: ICD-10-CM

## 2021-12-15 DIAGNOSIS — I10 ESSENTIAL HYPERTENSION: ICD-10-CM

## 2021-12-15 DIAGNOSIS — I20.89 STABLE ANGINA PECTORIS: ICD-10-CM

## 2021-12-15 DIAGNOSIS — I77.89 AORTIC ROOT ENLARGEMENT: ICD-10-CM

## 2021-12-15 DIAGNOSIS — I65.23 BILATERAL CAROTID ARTERY STENOSIS: ICD-10-CM

## 2021-12-15 DIAGNOSIS — Z95.5 S/P CORONARY ARTERY STENT PLACEMENT: ICD-10-CM

## 2021-12-15 DIAGNOSIS — N18.31 STAGE 3A CHRONIC KIDNEY DISEASE: ICD-10-CM

## 2021-12-15 DIAGNOSIS — I25.119 CORONARY ARTERY DISEASE INVOLVING NATIVE CORONARY ARTERY OF NATIVE HEART WITH ANGINA PECTORIS: ICD-10-CM

## 2021-12-15 DIAGNOSIS — G45.3 AMAUROSIS FUGAX: ICD-10-CM

## 2021-12-15 DIAGNOSIS — E78.2 MIXED HYPERLIPIDEMIA: ICD-10-CM

## 2021-12-15 DIAGNOSIS — I25.119 CORONARY ARTERY DISEASE INVOLVING NATIVE CORONARY ARTERY OF NATIVE HEART WITH ANGINA PECTORIS: Primary | ICD-10-CM

## 2021-12-15 DIAGNOSIS — E78.00 PURE HYPERCHOLESTEROLEMIA: ICD-10-CM

## 2021-12-15 PROCEDURE — 93005 EKG 12-LEAD: ICD-10-PCS | Mod: S$GLB,,, | Performed by: INTERNAL MEDICINE

## 2021-12-15 PROCEDURE — 99999 PR PBB SHADOW E&M-EST. PATIENT-LVL III: ICD-10-PCS | Mod: PBBFAC,,, | Performed by: INTERNAL MEDICINE

## 2021-12-15 PROCEDURE — 4010F ACE/ARB THERAPY RXD/TAKEN: CPT | Mod: CPTII,S$GLB,, | Performed by: INTERNAL MEDICINE

## 2021-12-15 PROCEDURE — 93010 EKG 12-LEAD: ICD-10-PCS | Mod: S$GLB,,, | Performed by: INTERNAL MEDICINE

## 2021-12-15 PROCEDURE — 99999 PR PBB SHADOW E&M-EST. PATIENT-LVL III: CPT | Mod: PBBFAC,,, | Performed by: INTERNAL MEDICINE

## 2021-12-15 PROCEDURE — 4010F PR ACE/ARB THEARPY RXD/TAKEN: ICD-10-PCS | Mod: CPTII,S$GLB,, | Performed by: INTERNAL MEDICINE

## 2021-12-15 PROCEDURE — 93010 ELECTROCARDIOGRAM REPORT: CPT | Mod: S$GLB,,, | Performed by: INTERNAL MEDICINE

## 2021-12-15 PROCEDURE — 99214 PR OFFICE/OUTPT VISIT, EST, LEVL IV, 30-39 MIN: ICD-10-PCS | Mod: S$GLB,,, | Performed by: INTERNAL MEDICINE

## 2021-12-15 PROCEDURE — 93005 ELECTROCARDIOGRAM TRACING: CPT | Mod: S$GLB,,, | Performed by: INTERNAL MEDICINE

## 2021-12-15 PROCEDURE — 99214 OFFICE O/P EST MOD 30 MIN: CPT | Mod: S$GLB,,, | Performed by: INTERNAL MEDICINE

## 2021-12-15 RX ORDER — ROSUVASTATIN CALCIUM 40 MG/1
40 TABLET, COATED ORAL DAILY
Qty: 90 TABLET | Refills: 3 | Status: SHIPPED | OUTPATIENT
Start: 2021-12-15 | End: 2022-11-08 | Stop reason: SDUPTHER

## 2021-12-15 RX ORDER — CARVEDILOL 12.5 MG/1
12.5 TABLET ORAL 2 TIMES DAILY WITH MEALS
Qty: 180 TABLET | Refills: 3 | Status: SHIPPED | OUTPATIENT
Start: 2021-12-15 | End: 2022-06-10 | Stop reason: SINTOL

## 2022-01-11 DIAGNOSIS — I10 ESSENTIAL HYPERTENSION: ICD-10-CM

## 2022-01-11 DIAGNOSIS — Z95.1 S/P CABG X 4: ICD-10-CM

## 2022-01-11 DIAGNOSIS — Z95.5 S/P CORONARY ARTERY STENT PLACEMENT: Primary | ICD-10-CM

## 2022-02-10 ENCOUNTER — TELEPHONE (OUTPATIENT)
Dept: CARDIOLOGY | Facility: HOSPITAL | Age: 67
End: 2022-02-10
Payer: COMMERCIAL

## 2022-02-11 ENCOUNTER — CLINICAL SUPPORT (OUTPATIENT)
Dept: CARDIOLOGY | Facility: HOSPITAL | Age: 67
End: 2022-02-11
Attending: INTERNAL MEDICINE
Payer: COMMERCIAL

## 2022-02-11 VITALS — BODY MASS INDEX: 32.21 KG/M2 | WEIGHT: 225 LBS | HEIGHT: 70 IN

## 2022-02-11 DIAGNOSIS — I25.119 CORONARY ARTERY DISEASE INVOLVING NATIVE CORONARY ARTERY OF NATIVE HEART WITH ANGINA PECTORIS: ICD-10-CM

## 2022-02-11 DIAGNOSIS — I77.89 AORTIC ROOT ENLARGEMENT: ICD-10-CM

## 2022-02-11 PROCEDURE — 93306 TTE W/DOPPLER COMPLETE: CPT

## 2022-02-11 PROCEDURE — 93306 ECHO (CUPID ONLY): ICD-10-PCS | Mod: 26,,, | Performed by: INTERNAL MEDICINE

## 2022-02-11 PROCEDURE — 93306 TTE W/DOPPLER COMPLETE: CPT | Mod: 26,,, | Performed by: INTERNAL MEDICINE

## 2022-02-13 LAB
AORTIC ROOT ANNULUS: 3.75 CM
AV INDEX (PROSTH): 0.73
AV MEAN GRADIENT: 4 MMHG
AV PEAK GRADIENT: 7 MMHG
AV VALVE AREA: 2.91 CM2
AV VELOCITY RATIO: 76.46
BSA FOR ECHO PROCEDURE: 2.24 M2
CV ECHO LV RWT: 0.62 CM
DOP CALC AO PEAK VEL: 1.33 M/S
DOP CALC AO VTI: 27.79 CM
DOP CALC LVOT AREA: 4 CM2
DOP CALC LVOT DIAMETER: 2.25 CM
DOP CALC LVOT PEAK VEL: 101.69 M/S
DOP CALC LVOT STROKE VOLUME: 80.91 CM3
DOP CALCLVOT PEAK VEL VTI: 20.36 CM
E WAVE DECELERATION TIME: 211.34 MSEC
E/A RATIO: 1.54
E/E' RATIO: 8.67 M/S
ECHO LV POSTERIOR WALL: 1.31 CM (ref 0.6–1.1)
EJECTION FRACTION: 55 %
FRACTIONAL SHORTENING: 24 % (ref 28–44)
INTERVENTRICULAR SEPTUM: 1.62 CM (ref 0.6–1.1)
LEFT ATRIUM SIZE: 3.98 CM
LEFT INTERNAL DIMENSION IN SYSTOLE: 3.2 CM (ref 2.1–4)
LEFT VENTRICLE DIASTOLIC VOLUME INDEX: 41.19 ML/M2
LEFT VENTRICLE DIASTOLIC VOLUME: 90.2 ML
LEFT VENTRICLE MASS INDEX: 111 G/M2
LEFT VENTRICLE SYSTOLIC VOLUME INDEX: 10.5 ML/M2
LEFT VENTRICLE SYSTOLIC VOLUME: 23.1 ML
LEFT VENTRICULAR INTERNAL DIMENSION IN DIASTOLE: 4.23 CM (ref 3.5–6)
LEFT VENTRICULAR MASS: 243.02 G
LV LATERAL E/E' RATIO: 10.11 M/S
LV SEPTAL E/E' RATIO: 7.58 M/S
MV PEAK A VEL: 0.59 M/S
MV PEAK E VEL: 0.91 M/S
PISA TR MAX VEL: 1.92 M/S
PV PEAK VELOCITY: 105.11 CM/S
RA PRESSURE: 3 MMHG
RIGHT VENTRICULAR END-DIASTOLIC DIMENSION: 394 CM
TDI LATERAL: 0.09 M/S
TDI SEPTAL: 0.12 M/S
TDI: 0.11 M/S
TR MAX PG: 15 MMHG
TV REST PULMONARY ARTERY PRESSURE: 18 MMHG

## 2022-02-14 ENCOUNTER — TELEPHONE (OUTPATIENT)
Dept: CARDIOLOGY | Facility: CLINIC | Age: 67
End: 2022-02-14
Payer: COMMERCIAL

## 2022-04-27 ENCOUNTER — PATIENT MESSAGE (OUTPATIENT)
Dept: CARDIOLOGY | Facility: CLINIC | Age: 67
End: 2022-04-27

## 2022-06-07 ENCOUNTER — PATIENT MESSAGE (OUTPATIENT)
Dept: CARDIOLOGY | Facility: CLINIC | Age: 67
End: 2022-06-07
Payer: COMMERCIAL

## 2022-06-10 ENCOUNTER — OFFICE VISIT (OUTPATIENT)
Dept: CARDIOLOGY | Facility: CLINIC | Age: 67
End: 2022-06-10
Payer: COMMERCIAL

## 2022-06-10 ENCOUNTER — TELEPHONE (OUTPATIENT)
Dept: CARDIOLOGY | Facility: CLINIC | Age: 67
End: 2022-06-10
Payer: COMMERCIAL

## 2022-06-10 ENCOUNTER — LAB VISIT (OUTPATIENT)
Dept: LAB | Facility: HOSPITAL | Age: 67
End: 2022-06-10
Payer: COMMERCIAL

## 2022-06-10 VITALS
WEIGHT: 241.63 LBS | DIASTOLIC BLOOD PRESSURE: 68 MMHG | BODY MASS INDEX: 34.59 KG/M2 | HEART RATE: 60 BPM | SYSTOLIC BLOOD PRESSURE: 116 MMHG | OXYGEN SATURATION: 96 % | HEIGHT: 70 IN

## 2022-06-10 DIAGNOSIS — R60.0 PERIPHERAL EDEMA: ICD-10-CM

## 2022-06-10 DIAGNOSIS — N18.31 STAGE 3A CHRONIC KIDNEY DISEASE: ICD-10-CM

## 2022-06-10 DIAGNOSIS — E78.2 MIXED HYPERLIPIDEMIA: ICD-10-CM

## 2022-06-10 DIAGNOSIS — I77.89 AORTIC ROOT ENLARGEMENT: ICD-10-CM

## 2022-06-10 DIAGNOSIS — I10 ESSENTIAL HYPERTENSION: ICD-10-CM

## 2022-06-10 DIAGNOSIS — I65.23 BILATERAL CAROTID ARTERY STENOSIS: ICD-10-CM

## 2022-06-10 DIAGNOSIS — Z95.1 S/P CABG X 4: ICD-10-CM

## 2022-06-10 DIAGNOSIS — R60.0 PERIPHERAL EDEMA: Primary | ICD-10-CM

## 2022-06-10 DIAGNOSIS — E03.9 HYPOTHYROIDISM, UNSPECIFIED TYPE: ICD-10-CM

## 2022-06-10 DIAGNOSIS — I25.10 CORONARY ARTERY DISEASE INVOLVING NATIVE CORONARY ARTERY OF NATIVE HEART WITHOUT ANGINA PECTORIS: ICD-10-CM

## 2022-06-10 DIAGNOSIS — Z95.5 S/P CORONARY ARTERY STENT PLACEMENT: ICD-10-CM

## 2022-06-10 LAB
ALBUMIN SERPL BCP-MCNC: 3.9 G/DL (ref 3.5–5.2)
ALP SERPL-CCNC: 82 U/L (ref 55–135)
ALT SERPL W/O P-5'-P-CCNC: 42 U/L (ref 10–44)
ANION GAP SERPL CALC-SCNC: 13 MMOL/L (ref 8–16)
AST SERPL-CCNC: 24 U/L (ref 10–40)
BILIRUB SERPL-MCNC: 0.4 MG/DL (ref 0.1–1)
BNP SERPL-MCNC: 21 PG/ML (ref 0–99)
BUN SERPL-MCNC: 19 MG/DL (ref 8–23)
CALCIUM SERPL-MCNC: 10.1 MG/DL (ref 8.7–10.5)
CHLORIDE SERPL-SCNC: 104 MMOL/L (ref 95–110)
CO2 SERPL-SCNC: 24 MMOL/L (ref 23–29)
CREAT SERPL-MCNC: 1.4 MG/DL (ref 0.5–1.4)
ERYTHROCYTE [DISTWIDTH] IN BLOOD BY AUTOMATED COUNT: 13.7 % (ref 11.5–14.5)
EST. GFR  (AFRICAN AMERICAN): 59.7 ML/MIN/1.73 M^2
EST. GFR  (NON AFRICAN AMERICAN): 51.6 ML/MIN/1.73 M^2
GLUCOSE SERPL-MCNC: 85 MG/DL (ref 70–110)
HCT VFR BLD AUTO: 47.3 % (ref 40–54)
HGB BLD-MCNC: 15.4 G/DL (ref 14–18)
MAGNESIUM SERPL-MCNC: 2.2 MG/DL (ref 1.6–2.6)
MCH RBC QN AUTO: 30.4 PG (ref 27–31)
MCHC RBC AUTO-ENTMCNC: 32.6 G/DL (ref 32–36)
MCV RBC AUTO: 93 FL (ref 82–98)
PLATELET # BLD AUTO: 280 K/UL (ref 150–450)
PMV BLD AUTO: 10.9 FL (ref 9.2–12.9)
POTASSIUM SERPL-SCNC: 4 MMOL/L (ref 3.5–5.1)
PROT SERPL-MCNC: 8.4 G/DL (ref 6–8.4)
RBC # BLD AUTO: 5.07 M/UL (ref 4.6–6.2)
SODIUM SERPL-SCNC: 141 MMOL/L (ref 136–145)
WBC # BLD AUTO: 7.76 K/UL (ref 3.9–12.7)

## 2022-06-10 PROCEDURE — 99999 PR PBB SHADOW E&M-EST. PATIENT-LVL IV: ICD-10-PCS | Mod: PBBFAC,,, | Performed by: NURSE PRACTITIONER

## 2022-06-10 PROCEDURE — 3288F FALL RISK ASSESSMENT DOCD: CPT | Mod: CPTII,S$GLB,, | Performed by: NURSE PRACTITIONER

## 2022-06-10 PROCEDURE — 3288F PR FALLS RISK ASSESSMENT DOCUMENTED: ICD-10-PCS | Mod: CPTII,S$GLB,, | Performed by: NURSE PRACTITIONER

## 2022-06-10 PROCEDURE — 1159F PR MEDICATION LIST DOCUMENTED IN MEDICAL RECORD: ICD-10-PCS | Mod: CPTII,S$GLB,, | Performed by: NURSE PRACTITIONER

## 2022-06-10 PROCEDURE — 1160F PR REVIEW ALL MEDS BY PRESCRIBER/CLIN PHARMACIST DOCUMENTED: ICD-10-PCS | Mod: CPTII,S$GLB,, | Performed by: NURSE PRACTITIONER

## 2022-06-10 PROCEDURE — 83735 ASSAY OF MAGNESIUM: CPT | Performed by: NURSE PRACTITIONER

## 2022-06-10 PROCEDURE — 83880 ASSAY OF NATRIURETIC PEPTIDE: CPT | Performed by: NURSE PRACTITIONER

## 2022-06-10 PROCEDURE — 1101F PT FALLS ASSESS-DOCD LE1/YR: CPT | Mod: CPTII,S$GLB,, | Performed by: NURSE PRACTITIONER

## 2022-06-10 PROCEDURE — 3078F PR MOST RECENT DIASTOLIC BLOOD PRESSURE < 80 MM HG: ICD-10-PCS | Mod: CPTII,S$GLB,, | Performed by: NURSE PRACTITIONER

## 2022-06-10 PROCEDURE — 1126F PR PAIN SEVERITY QUANTIFIED, NO PAIN PRESENT: ICD-10-PCS | Mod: CPTII,S$GLB,, | Performed by: NURSE PRACTITIONER

## 2022-06-10 PROCEDURE — 1126F AMNT PAIN NOTED NONE PRSNT: CPT | Mod: CPTII,S$GLB,, | Performed by: NURSE PRACTITIONER

## 2022-06-10 PROCEDURE — 36415 COLL VENOUS BLD VENIPUNCTURE: CPT | Performed by: NURSE PRACTITIONER

## 2022-06-10 PROCEDURE — 99214 OFFICE O/P EST MOD 30 MIN: CPT | Mod: S$GLB,,, | Performed by: NURSE PRACTITIONER

## 2022-06-10 PROCEDURE — 4010F ACE/ARB THERAPY RXD/TAKEN: CPT | Mod: CPTII,S$GLB,, | Performed by: NURSE PRACTITIONER

## 2022-06-10 PROCEDURE — 1101F PR PT FALLS ASSESS DOC 0-1 FALLS W/OUT INJ PAST YR: ICD-10-PCS | Mod: CPTII,S$GLB,, | Performed by: NURSE PRACTITIONER

## 2022-06-10 PROCEDURE — 3074F SYST BP LT 130 MM HG: CPT | Mod: CPTII,S$GLB,, | Performed by: NURSE PRACTITIONER

## 2022-06-10 PROCEDURE — 99214 PR OFFICE/OUTPT VISIT, EST, LEVL IV, 30-39 MIN: ICD-10-PCS | Mod: S$GLB,,, | Performed by: NURSE PRACTITIONER

## 2022-06-10 PROCEDURE — 3078F DIAST BP <80 MM HG: CPT | Mod: CPTII,S$GLB,, | Performed by: NURSE PRACTITIONER

## 2022-06-10 PROCEDURE — 3008F PR BODY MASS INDEX (BMI) DOCUMENTED: ICD-10-PCS | Mod: CPTII,S$GLB,, | Performed by: NURSE PRACTITIONER

## 2022-06-10 PROCEDURE — 4010F PR ACE/ARB THEARPY RXD/TAKEN: ICD-10-PCS | Mod: CPTII,S$GLB,, | Performed by: NURSE PRACTITIONER

## 2022-06-10 PROCEDURE — 1160F RVW MEDS BY RX/DR IN RCRD: CPT | Mod: CPTII,S$GLB,, | Performed by: NURSE PRACTITIONER

## 2022-06-10 PROCEDURE — 85027 COMPLETE CBC AUTOMATED: CPT | Performed by: NURSE PRACTITIONER

## 2022-06-10 PROCEDURE — 99999 PR PBB SHADOW E&M-EST. PATIENT-LVL IV: CPT | Mod: PBBFAC,,, | Performed by: NURSE PRACTITIONER

## 2022-06-10 PROCEDURE — 1159F MED LIST DOCD IN RCRD: CPT | Mod: CPTII,S$GLB,, | Performed by: NURSE PRACTITIONER

## 2022-06-10 PROCEDURE — 3074F PR MOST RECENT SYSTOLIC BLOOD PRESSURE < 130 MM HG: ICD-10-PCS | Mod: CPTII,S$GLB,, | Performed by: NURSE PRACTITIONER

## 2022-06-10 PROCEDURE — 80053 COMPREHEN METABOLIC PANEL: CPT | Performed by: NURSE PRACTITIONER

## 2022-06-10 PROCEDURE — 3008F BODY MASS INDEX DOCD: CPT | Mod: CPTII,S$GLB,, | Performed by: NURSE PRACTITIONER

## 2022-06-10 RX ORDER — AMLODIPINE BESYLATE 5 MG/1
5 TABLET ORAL DAILY
Qty: 90 TABLET | Refills: 3 | Status: SHIPPED | OUTPATIENT
Start: 2022-06-10 | End: 2023-08-08 | Stop reason: SDUPTHER

## 2022-06-10 RX ORDER — LOSARTAN POTASSIUM 100 MG/1
100 TABLET ORAL DAILY
Qty: 90 TABLET | Refills: 3 | Status: SHIPPED | OUTPATIENT
Start: 2022-06-10 | End: 2023-05-11 | Stop reason: SDUPTHER

## 2022-06-10 RX ORDER — NEBIVOLOL 5 MG/1
5 TABLET ORAL DAILY
Qty: 90 TABLET | Refills: 3 | Status: SHIPPED | OUTPATIENT
Start: 2022-06-10 | End: 2022-11-08 | Stop reason: SDUPTHER

## 2022-06-10 NOTE — PROGRESS NOTES
Mr. Arzate is a patient of Dr. Gutierres and was last seen in [facility] 12/15/2021.      Subjective:   Patient ID:  Preston Arzate is a 67 y.o. male who presents for follow-up of Establish Care, Follow-up, Dizziness, Chest Pain, and Shortness of Breath    Problems:  CAD s/p CABGx4 in 2005, GREGORIA LM and SVG-PDA in 12/2015  HLD  TIA (amarousus fugax left)  HTN    HPI  Mr. Arzate is in clinic today for a 2 week history of LE edema, fatigue and arthralgia.  His weight is up 16 pounds since his last clinic wt in December.  He had recently switched from bystolic to coreg.  When he went back to his bystolic, the LE edema resolved.  In UTD, there is SE in 1-3% of patients with LE edema.  Arthralgia was described in 6% of patients on coreg.  His sx resolved after changing back to his bystolic.      Very thick septum 1.62 cm (IVS) and posterior wall 1.31 cm    Review of Systems   Constitutional: Negative for decreased appetite, diaphoresis, malaise/fatigue, weight gain and weight loss.   Eyes: Negative for visual disturbance.   Cardiovascular: Negative for chest pain, claudication, dyspnea on exertion, irregular heartbeat, leg swelling, near-syncope, orthopnea, palpitations, paroxysmal nocturnal dyspnea and syncope.        Denies chest pressure   Respiratory: Negative for cough, hemoptysis, shortness of breath, sleep disturbances due to breathing and snoring.    Endocrine: Negative for cold intolerance and heat intolerance.   Hematologic/Lymphatic: Negative for bleeding problem. Does not bruise/bleed easily.   Musculoskeletal: Negative for myalgias.   Gastrointestinal: Negative for bloating, abdominal pain, anorexia, change in bowel habit, constipation, diarrhea, nausea and vomiting.   Neurological: Negative for difficulty with concentration, disturbances in coordination, excessive daytime sleepiness, dizziness, headaches, light-headedness, loss of balance, numbness and weakness.   Psychiatric/Behavioral: The  "patient does not have insomnia.        Allergies and current medications updated and reviewed:  Review of patient's allergies indicates:   Allergen Reactions    Imdur [isosorbide mononitrate]      headaches     Current Outpatient Medications   Medication Sig    amLODIPine (NORVASC) 10 MG tablet TAKE 1 TABLET(10 MG) BY MOUTH EVERY DAY    aspirin (ECOTRIN) 325 MG EC tablet Take 325 mg by mouth once daily.    carvediloL (COREG) 12.5 MG tablet Take 1 tablet (12.5 mg total) by mouth 2 (two) times daily with meals.    clopidogreL (PLAVIX) 75 mg tablet Take 1 tablet (75 mg total) by mouth once daily.    coenzyme Q10 10 mg capsule Take 200 mg by mouth once daily.    etodolac (LODINE) 400 MG tablet Take 400 mg by mouth 2 (two) times daily.    fish oil-omega-3 fatty acids 300-1,000 mg capsule Take 1,200 g by mouth once daily.    levothyroxine (SYNTHROID) 125 MCG tablet Take 125 mcg by mouth once daily.     losartan (COZAAR) 50 MG tablet TAKE 1 TABLET(50 MG) BY MOUTH EVERY DAY (Patient taking differently: 25 mg once daily. TAKE 1 TABLET(50 MG) BY MOUTH EVERY DAY)    ranolazine (RANEXA) 1,000 mg Tb12 TAKE 1 TABLET BY MOUTH TWICE DAILY    rosuvastatin (CRESTOR) 40 MG Tab Take 1 tablet (40 mg total) by mouth once daily.    testosterone cypionate 200 mg/mL Kit Inject 200 mg into the muscle every 14 (fourteen) days.     No current facility-administered medications for this visit.       Objective:     Right Arm BP - Sittin/64 (06/10/22 1054)  Left Arm BP - Sittin/68 (06/10/22 1054)    /68 (BP Location: Left arm, Patient Position: Sitting, BP Method: Large (Automatic))   Pulse 60   Ht 5' 10" (1.778 m)   Wt 109.6 kg (241 lb 10 oz)   SpO2 96%   BMI 34.67 kg/m²       Physical Exam  Vitals and nursing note reviewed.   Constitutional:       General: He is not in acute distress.     Appearance: Normal appearance. He is well-developed. He is not diaphoretic.   HENT:      Head: Normocephalic and " atraumatic.   Eyes:      General: Lids are normal. No scleral icterus.     Conjunctiva/sclera: Conjunctivae normal.   Neck:      Vascular: Normal carotid pulses. No carotid bruit, hepatojugular reflux or JVD.   Cardiovascular:      Rate and Rhythm: Normal rate and regular rhythm.      Chest Wall: PMI is not displaced.      Pulses: Intact distal pulses.           Carotid pulses are 2+ on the right side and 2+ on the left side.       Radial pulses are 2+ on the right side and 2+ on the left side.        Dorsalis pedis pulses are 2+ on the right side and 2+ on the left side.        Posterior tibial pulses are 1+ on the right side and 1+ on the left side.      Heart sounds: S1 normal and S2 normal. No murmur heard.    No friction rub. No gallop.   Pulmonary:      Effort: Pulmonary effort is normal. No respiratory distress.      Breath sounds: Normal breath sounds. No decreased breath sounds, wheezing, rhonchi or rales.   Chest:      Chest wall: No tenderness.   Abdominal:      General: Bowel sounds are normal. There is no distension or abdominal bruit.      Palpations: Abdomen is soft. There is no fluid wave or pulsatile mass.      Tenderness: There is no abdominal tenderness.   Musculoskeletal:         General: Swelling (BLE +2 to knees) present.      Cervical back: Neck supple.   Skin:     General: Skin is warm and dry.      Findings: No rash.      Nails: There is no clubbing.   Neurological:      Mental Status: He is alert and oriented to person, place, and time.      Gait: Gait normal.   Psychiatric:         Speech: Speech normal.         Behavior: Behavior normal.         Thought Content: Thought content normal.         Judgment: Judgment normal.         Chemistry        Component Value Date/Time     06/10/2022 1220    K 4.0 06/10/2022 1220     06/10/2022 1220    CO2 24 06/10/2022 1220    BUN 19 06/10/2022 1220    CREATININE 1.4 06/10/2022 1220    GLU 85 06/10/2022 1220        Component Value Date/Time     CALCIUM 10.1 06/10/2022 1220    ALKPHOS 82 06/10/2022 1220    AST 24 06/10/2022 1220    ALT 42 06/10/2022 1220    BILITOT 0.4 06/10/2022 1220    ESTGFRAFRICA 59.7 (A) 06/10/2022 1220    EGFRNONAA 51.6 (A) 06/10/2022 1220        No results found for: LABA1C, HGBA1C      Recent Labs   Lab 10/03/19  0947 06/05/20  0809 06/10/22  1220   WBC  --    < > 7.76   Hemoglobin  --    < > 15.4   Hematocrit  --    < > 47.3   MCV  --    < > 93   Platelets  --    < > 280   BNP  --   --  21   Cholesterol 125  --   --    HDL 34 L  --   --    LDL Cholesterol 70.0  --   --    Triglycerides 105  --   --    HDL/Cholesterol Ratio 27.2  --   --     < > = values in this interval not displayed.       Recent Labs   Lab 06/05/20  0809   INR 1.0        Test(s) Reviewed  I have reviewed the following in detail:  [] Stress test   [] Angiography   [x] Echocardiogram   [x] Labs   [] Other:         Assessment/Plan:   1. Peripheral edema  Improved after stopping the coreg and re-starting his bystolic.  Will get BNP today.  He has LE edema but no JVD and lungs are CTA.  He denies SOB.  Low suspicion for HF.  Suspect amlodipine 10 mg as source of he edema.     - BNP; Future  - Echo Saline Bubble? No; Future    2. Coronary artery disease involving native coronary artery of native heart without angina pectoris  Asymptomatic. Stable. Monitor.     - amLODIPine (NORVASC) 5 MG tablet; Take 1 tablet (5 mg total) by mouth once daily.  Dispense: 90 tablet; Refill: 3    3. S/P CABG x 4      4. S/P coronary artery stent placement      5. Bilateral carotid artery stenosis  Asymptomatic. Monitor.     6. Mixed hyperlipidemia  LDL at goal <70. Needs repeat lipids. Not fasting today.      - Comprehensive Metabolic Panel; Future  - Magnesium; Future  - CBC Without Differential; Future    7. Hypothyroidism, unspecified type  Lab Results   Component Value Date    TSH 0.645 04/19/2018    FREET4 0.89 12/08/2015       8. Essential hypertension  He has commercial  insurance.  Printed copay card and instructed to call to activate card.  He will call if he has trouble accessing the bystolic.      - nebivoloL (BYSTOLIC) 5 MG Tab; Take 1 tablet (5 mg total) by mouth once daily.  Dispense: 90 tablet; Refill: 3    Update:  BNP normal.  LE edema likely secondary to CCB.  Decrease amlodipine to 5 mg daily and increase losartan from 50 to 100 mg daily.  BMP in 2 weeks.     - amLODIPine (NORVASC) 5 MG tablet; Take 1 tablet (5 mg total) by mouth once daily.  Dispense: 90 tablet; Refill: 3  - losartan (COZAAR) 100 MG tablet; Take 1 tablet (100 mg total) by mouth once daily.  Dispense: 90 tablet; Refill: 3  - Basic Metabolic Panel; Future    9. Aortic root enlargement  Not noted on most recent TTE done in [place]    10. Stage 3a chronic kidney disease    - CBC Without Differential; Future    Update: CKD improved on labs today.        A copy of this note will be forwarded to Dr. Gutierres     Follow up in about 3 months (around 9/10/2022).

## 2022-06-23 ENCOUNTER — LAB VISIT (OUTPATIENT)
Dept: LAB | Facility: HOSPITAL | Age: 67
End: 2022-06-23
Attending: NURSE PRACTITIONER
Payer: COMMERCIAL

## 2022-06-23 DIAGNOSIS — I10 ESSENTIAL HYPERTENSION: ICD-10-CM

## 2022-06-23 LAB
ANION GAP SERPL CALC-SCNC: 7 MMOL/L (ref 8–16)
BUN SERPL-MCNC: 17 MG/DL (ref 8–23)
CALCIUM SERPL-MCNC: 9.5 MG/DL (ref 8.7–10.5)
CHLORIDE SERPL-SCNC: 105 MMOL/L (ref 95–110)
CO2 SERPL-SCNC: 27 MMOL/L (ref 23–29)
CREAT SERPL-MCNC: 1.4 MG/DL (ref 0.5–1.4)
EST. GFR  (AFRICAN AMERICAN): 59.7 ML/MIN/1.73 M^2
EST. GFR  (NON AFRICAN AMERICAN): 51.6 ML/MIN/1.73 M^2
GLUCOSE SERPL-MCNC: 87 MG/DL (ref 70–110)
POTASSIUM SERPL-SCNC: 4.2 MMOL/L (ref 3.5–5.1)
SODIUM SERPL-SCNC: 139 MMOL/L (ref 136–145)

## 2022-06-23 PROCEDURE — 36415 COLL VENOUS BLD VENIPUNCTURE: CPT | Mod: PO | Performed by: NURSE PRACTITIONER

## 2022-06-23 PROCEDURE — 80048 BASIC METABOLIC PNL TOTAL CA: CPT | Performed by: NURSE PRACTITIONER

## 2022-07-22 ENCOUNTER — TELEPHONE (OUTPATIENT)
Dept: CARDIOLOGY | Facility: CLINIC | Age: 67
End: 2022-07-22
Payer: COMMERCIAL

## 2022-07-22 NOTE — TELEPHONE ENCOUNTER
I spoke with patient. Pt had been switched from nebivolol to carvedilol due to lack of insurance coverage. He went back to nebivolol due to feeling lethargy, without energy, having body aches on carvedilol. Once he restarted nebivolol his LE edema improved as per Ms Pruitt notes.  Pt has been paying for the med with a coupon. I told him to call me back if having issues paying for med and he verbalized understanding.

## 2022-08-11 ENCOUNTER — PATIENT MESSAGE (OUTPATIENT)
Dept: CARDIOLOGY | Facility: CLINIC | Age: 67
End: 2022-08-11
Payer: COMMERCIAL

## 2022-11-07 NOTE — PROGRESS NOTES
Subjective:   Patient ID:  Preston Arzate is a 67 y.o. male who presents for follow-up of No chief complaint on file.    PROBLEM LIST:  CAD s/p CABG x 2 2005 (LIMA to LAD, SVG to PDA)  -PCI LM, SVG to PDA 2015  HTN  HLD  Amarousus fugax 2018  Bilateral carotid stenosis (20-39% CHARLENE, 30-49% LICA)  REMBERTO on cpap  Enlarged ascending aorta (4.4 cm)    HPI:  He has been feeling well since his last visit.  His lower extremity edema has resolved after changing carvedilol back to nebivolol.  He has not had any further chest pains.  He states he only takes 500 mg of renal is seen once a day.  He has been cutting his 1000 mg tablets in half.  He continues to take aspirin 325 mg daily as well as Plavix.  Walks a lot at work but is not exercising on a regular basis.  His weight is down about 17 lb.  He checks his blood pressure few times a week at home and states it typically runs about 119/78.  He had an echo done earlier this morning that has not yet been officially read.  His BNP was normal.    ECG 5-DEC-2021 09:29:53: Personally reviewed by me shows normal sinus rhythm at 63 beats per minute with nonspecific ST changes.    Echo 2/22:  Summary    The left ventricle is normal in size with moderate concentric hypertrophy and normal systolic function.  The estimated ejection fraction is 55%.  Normal left ventricular diastolic function.  Normal right ventricular size with normal right ventricular systolic function.  Normal central venous pressure (3 mmHg).  The estimated PA systolic pressure is 18 mmHg.       PET stres  6/20:  Conclusion         There is a very small sized, mild intensity mid inferior reversible perfusion abnormality in the distribution of thePDA indicative of focal coronary stenosis.    Within the perfusion abnormality absolute myocardial perfusion (cc/min/gm) averaged 0.60 cc/min/g at rest, 0.88 cc/min/g at stress and CFR was 1.46 cc/min/g, which equates to mildly reduced coronary flow capacity (Green)  in 6% of the myocardium (within the PDA territory) .    Whole heart absolute myocardial perfusion (cc/min/g) averaged 0.75 cc/min/g at rest, 1.40 cc/min/g at stress, and 1.88  CFR.    Gated perfusion images showed an ejection fraction of 74% at rest and 75% during stress. Normal ejection fraction is greater than 51%.    Wall motion was normal at rest and during stress.    LV cavity size is normal at rest and stress.    The EKG portion of this study is abnormal but not diagnostic.    There were no arrhythmias during stress.    The patient reported no chest pain during the stress test.    When compared to the prior study on 5/14/2018, absolute stress flow and coronary flow capacity has improved in all territories.  These findings are most consistent with either physiologic improvement of CAD or likely caffeine effect on the prior study.  Overall, the perfusion patterns are visually very similar.      Carotid US 1/18:  CONCLUSIONS   There is 20 - 39% right Internal Carotid stenosis.   There is 40 - 49% left Internal Carotid stenosis.     Past Medical History:   Diagnosis Date    Bilateral carotid artery stenosis 12/15/2021    Bilateral carotid artery stenosis 12/15/2021    Coronary artery disease     Hypertension     Mixed hyperlipidemia 12/15/2021    Pure hypercholesterolemia 10/1/2018    Stroke 01/2018    amarousus fugax left    Thyroid disease        Past Surgical History:   Procedure Laterality Date    CARDIAC CATHETERIZATION  03/2015    x 2    CORONARY ANGIOPLASTY  12/15    GREGORIA X 2 left main and SVG to PDA    CORONARY ARTERY BYPASS GRAFT  07/2005    x 4    TOTAL THYROIDECTOMY         Social History     Socioeconomic History    Marital status: Single   Tobacco Use    Smoking status: Never    Smokeless tobacco: Never   Substance and Sexual Activity    Alcohol use: No   Social History Narrative    ** Merged History Encounter **            Family History   Problem Relation Age of Onset    Hypertension Mother     Heart  attack Father     Heart disease Sister     Hypertension Sister     Heart attack Paternal Grandfather        Patient's Medications   New Prescriptions    No medications on file   Previous Medications    AMLODIPINE (NORVASC) 5 MG TABLET    Take 1 tablet (5 mg total) by mouth once daily.    ASPIRIN (ECOTRIN) 325 MG EC TABLET    Take 325 mg by mouth once daily.    CLOPIDOGREL (PLAVIX) 75 MG TABLET    Take 1 tablet (75 mg total) by mouth once daily.    COENZYME Q10 10 MG CAPSULE    Take 200 mg by mouth once daily.    ETODOLAC (LODINE) 400 MG TABLET    Take 400 mg by mouth 2 (two) times daily.    FISH OIL-OMEGA-3 FATTY ACIDS 300-1,000 MG CAPSULE    Take 1,200 g by mouth once daily.    LEVOTHYROXINE (SYNTHROID) 125 MCG TABLET    Take 125 mcg by mouth once daily.     LOSARTAN (COZAAR) 100 MG TABLET    Take 1 tablet (100 mg total) by mouth once daily.    NEBIVOLOL (BYSTOLIC) 5 MG TAB    Take 1 tablet (5 mg total) by mouth once daily.    RANOLAZINE (RANEXA) 1,000 MG TB12    TAKE 1 TABLET BY MOUTH TWICE DAILY    ROSUVASTATIN (CRESTOR) 40 MG TAB    Take 1 tablet (40 mg total) by mouth once daily.    TESTOSTERONE CYPIONATE 200 MG/ML KIT    Inject 200 mg into the muscle every 14 (fourteen) days.   Modified Medications    No medications on file   Discontinued Medications    No medications on file       Review of Systems   Constitutional: Negative for weight gain.   HENT:  Negative for hearing loss.    Eyes:  Negative for visual disturbance.   Cardiovascular:  Negative for claudication, dyspnea on exertion, leg swelling, near-syncope, orthopnea, palpitations, paroxysmal nocturnal dyspnea and syncope.   Respiratory:  Negative for cough, shortness of breath, sleep disturbances due to breathing, snoring and wheezing.    Endocrine: Negative for cold intolerance, heat intolerance, polydipsia, polyphagia and polyuria.   Hematologic/Lymphatic: Negative for bleeding problem. Does not bruise/bleed easily.   Skin:  Negative for rash and  suspicious lesions.   Musculoskeletal:  Negative for arthritis, falls, joint pain, muscle weakness and myalgias.   Gastrointestinal:  Negative for abdominal pain, change in bowel habit, constipation, diarrhea, heartburn, hematochezia, melena and nausea.   Genitourinary:  Negative for hematuria and nocturia.   Neurological:  Negative for excessive daytime sleepiness, dizziness, headaches, light-headedness, loss of balance and weakness.   Psychiatric/Behavioral:  Negative for depression. The patient is not nervous/anxious.    Allergic/Immunologic: Negative for environmental allergies.     There were no vitals taken for this visit.    Objective:   Physical Exam  Constitutional:       Appearance: He is well-developed.      Comments:      HENT:      Head: Normocephalic and atraumatic.   Eyes:      General: No scleral icterus.     Conjunctiva/sclera: Conjunctivae normal.      Pupils: Pupils are equal, round, and reactive to light.   Neck:      Thyroid: No thyromegaly.      Vascular: No hepatojugular reflux or JVD.      Trachea: No tracheal deviation.   Cardiovascular:      Rate and Rhythm: Normal rate and regular rhythm.      Chest Wall: PMI is not displaced.      Pulses: Intact distal pulses.           Carotid pulses are 2+ on the right side and 2+ on the left side.       Radial pulses are 2+ on the right side and 2+ on the left side.        Dorsalis pedis pulses are 2+ on the right side and 2+ on the left side.        Posterior tibial pulses are 2+ on the right side and 2+ on the left side.      Heart sounds: Normal heart sounds.   Pulmonary:      Effort: Pulmonary effort is normal.      Breath sounds: Normal breath sounds.   Abdominal:      General: Bowel sounds are normal. There is no distension.      Palpations: Abdomen is soft. There is no mass.      Tenderness: There is no abdominal tenderness.   Musculoskeletal:         General: No tenderness.      Cervical back: Normal range of motion and neck supple.      Right  lower leg: Edema present.      Left lower leg: Edema present.      Comments: Trace pretibial edema bilaterally   Lymphadenopathy:      Cervical: No cervical adenopathy.   Skin:     General: Skin is warm and dry.      Findings: No erythema or rash.      Nails: There is no clubbing.   Neurological:      Mental Status: He is alert and oriented to person, place, and time.   Psychiatric:         Speech: Speech normal.         Behavior: Behavior normal.       Lab Results   Component Value Date     06/23/2022    K 4.2 06/23/2022     06/23/2022    CO2 27 06/23/2022    BUN 17 06/23/2022    CREATININE 1.4 06/23/2022    GLU 87 06/23/2022    MG 2.2 06/10/2022    AST 24 06/10/2022    ALT 42 06/10/2022    ALBUMIN 3.9 06/10/2022    PROT 8.4 06/10/2022    BILITOT 0.4 06/10/2022    WBC 7.76 06/10/2022    HGB 15.4 06/10/2022    HCT 47.3 06/10/2022    MCV 93 06/10/2022     06/10/2022    INR 1.0 06/05/2020    INR 0.9 12/08/2015    TSH 0.645 04/19/2018    CHOL 125 10/03/2019    HDL 34 (L) 10/03/2019    LDLCALC 70.0 10/03/2019    TRIG 105 10/03/2019    BNP 21 06/10/2022       Assessment:     1. Coronary artery disease involving native coronary artery of native heart with angina pectoris :  His angina is well controlled.  He never had a stress test done after his last visit, and his symptoms have resolved.  I gave him a new prescription for ranolazine 500 mg tablets it is not recommended to cut those tablets.  We discussed he can decrease his aspirin dose to 81 mg daily and can stop Plavix.   2. S/P CABG x 4    3. S/P coronary artery stent placement    4. Essential hypertension :  His home blood pressure readings are at goal.  He will bring his cuff in to confirm he is getting accurate readings.  I gave him a printed prescription for the Bystolic to see if he can get it at an affordable rate.  If not I asked him to notify me and we can try atenolol.   5. Aortic root enlargement :  His ascending aorta measured at 4.4 cm  on his echo in 2018.  He had a follow-up echocardiogram done this morning which has not yet been read.   6. Amaurosis fugax :  He has moderate bilateral carotid artery plaque without any significant stenoses.  Continue aspirin and statin therapy.  I have ordered a follow-up carotid ultrasound.   7. Stage 3a chronic kidney di:donya    8. Mixed hyperlipidemia :  His LDL is above goal in his last lipid profile.  I have ordered a follow-up lipid profile.  We discussed that if his LDL remains greater than 70 we will add Zetia 10 mg daily.   9. Bilateral carotid artery stenosis :  Follow-up carotid ultrasound ordered.               Plan:     Diagnoses and all orders for this visit:    Amaurosis fugax    Aortic root enlargement    Bilateral carotid artery stenosis    Coronary artery disease involving native coronary artery of native heart without angina pectoris    Essential hypertension    Mixed hyperlipidemia    S/P CABG x 4    S/P coronary artery stent placement    Stage 3a chronic kidney disease      Thank you for allowing me to participate in this patient's care. Please do not hesitate to contact me with any questions or concerns.

## 2022-11-08 ENCOUNTER — OFFICE VISIT (OUTPATIENT)
Dept: CARDIOLOGY | Facility: CLINIC | Age: 67
End: 2022-11-08
Payer: COMMERCIAL

## 2022-11-08 ENCOUNTER — HOSPITAL ENCOUNTER (OUTPATIENT)
Dept: CARDIOLOGY | Facility: HOSPITAL | Age: 67
Discharge: HOME | End: 2022-11-08
Attending: NURSE PRACTITIONER
Payer: COMMERCIAL

## 2022-11-08 VITALS
SYSTOLIC BLOOD PRESSURE: 143 MMHG | WEIGHT: 241 LBS | WEIGHT: 227.5 LBS | HEIGHT: 70 IN | BODY MASS INDEX: 34.5 KG/M2 | DIASTOLIC BLOOD PRESSURE: 84 MMHG | HEIGHT: 70 IN | HEART RATE: 70 BPM | HEART RATE: 62 BPM | SYSTOLIC BLOOD PRESSURE: 128 MMHG | BODY MASS INDEX: 32.57 KG/M2 | DIASTOLIC BLOOD PRESSURE: 74 MMHG

## 2022-11-08 DIAGNOSIS — I25.10 CORONARY ARTERY DISEASE INVOLVING NATIVE CORONARY ARTERY OF NATIVE HEART WITHOUT ANGINA PECTORIS: ICD-10-CM

## 2022-11-08 DIAGNOSIS — I65.23 BILATERAL CAROTID ARTERY STENOSIS: ICD-10-CM

## 2022-11-08 DIAGNOSIS — E78.2 MIXED HYPERLIPIDEMIA: ICD-10-CM

## 2022-11-08 DIAGNOSIS — I10 ESSENTIAL HYPERTENSION: ICD-10-CM

## 2022-11-08 DIAGNOSIS — N18.31 STAGE 3A CHRONIC KIDNEY DISEASE: ICD-10-CM

## 2022-11-08 DIAGNOSIS — R60.0 PERIPHERAL EDEMA: ICD-10-CM

## 2022-11-08 DIAGNOSIS — I25.119 CORONARY ARTERY DISEASE INVOLVING NATIVE CORONARY ARTERY OF NATIVE HEART WITH ANGINA PECTORIS: ICD-10-CM

## 2022-11-08 DIAGNOSIS — Z95.1 S/P CABG X 4: ICD-10-CM

## 2022-11-08 DIAGNOSIS — I20.89 STABLE ANGINA PECTORIS: ICD-10-CM

## 2022-11-08 DIAGNOSIS — Z95.5 S/P CORONARY ARTERY STENT PLACEMENT: ICD-10-CM

## 2022-11-08 DIAGNOSIS — G45.3 AMAUROSIS FUGAX: Primary | ICD-10-CM

## 2022-11-08 DIAGNOSIS — I77.89 AORTIC ROOT ENLARGEMENT: ICD-10-CM

## 2022-11-08 LAB
ASCENDING AORTA: 4.16 CM
AV INDEX (PROSTH): 0.85
AV MEAN GRADIENT: 3 MMHG
AV PEAK GRADIENT: 6 MMHG
AV VALVE AREA: 4.93 CM2
AV VELOCITY RATIO: 0.85
BSA FOR ECHO PROCEDURE: 2.32 M2
CV ECHO LV RWT: 0.32 CM
DOP CALC AO PEAK VEL: 1.19 M/S
DOP CALC AO VTI: 24.83 CM
DOP CALC LVOT AREA: 5.8 CM2
DOP CALC LVOT DIAMETER: 2.72 CM
DOP CALC LVOT PEAK VEL: 1.01 M/S
DOP CALC LVOT STROKE VOLUME: 122.49 CM3
DOP CALCLVOT PEAK VEL VTI: 21.09 CM
E WAVE DECELERATION TIME: 170.28 MSEC
E/A RATIO: 1.19
E/E' RATIO: 10.88 M/S
ECHO LV POSTERIOR WALL: 0.78 CM (ref 0.6–1.1)
EJECTION FRACTION: 65 %
FRACTIONAL SHORTENING: 29 % (ref 28–44)
INTERVENTRICULAR SEPTUM: 0.85 CM (ref 0.6–1.1)
IVRT: 62.8 MSEC
LA MAJOR: 5.57 CM
LA MINOR: 5.82 CM
LA WIDTH: 3.89 CM
LEFT ATRIUM SIZE: 4.04 CM
LEFT ATRIUM VOLUME INDEX MOD: 23.8 ML/M2
LEFT ATRIUM VOLUME INDEX: 33.6 ML/M2
LEFT ATRIUM VOLUME MOD: 53.87 CM3
LEFT ATRIUM VOLUME: 76.04 CM3
LEFT INTERNAL DIMENSION IN SYSTOLE: 3.45 CM (ref 2.1–4)
LEFT VENTRICLE DIASTOLIC VOLUME INDEX: 48.51 ML/M2
LEFT VENTRICLE DIASTOLIC VOLUME: 109.64 ML
LEFT VENTRICLE MASS INDEX: 58 G/M2
LEFT VENTRICLE SYSTOLIC VOLUME INDEX: 21.7 ML/M2
LEFT VENTRICLE SYSTOLIC VOLUME: 49.14 ML
LEFT VENTRICULAR INTERNAL DIMENSION IN DIASTOLE: 4.84 CM (ref 3.5–6)
LEFT VENTRICULAR MASS: 131.61 G
LV LATERAL E/E' RATIO: 9.67 M/S
LV SEPTAL E/E' RATIO: 12.43 M/S
MV A" WAVE DURATION": 9.71 MSEC
MV PEAK A VEL: 0.73 M/S
MV PEAK E VEL: 0.87 M/S
MV STENOSIS PRESSURE HALF TIME: 49.38 MS
MV VALVE AREA P 1/2 METHOD: 4.46 CM2
PISA TR MAX VEL: 2.01 M/S
PULM VEIN S/D RATIO: 1.06
PV PEAK D VEL: 0.5 M/S
PV PEAK S VEL: 0.53 M/S
RA MAJOR: 5.64 CM
RA PRESSURE: 3 MMHG
RA WIDTH: 3.69 CM
RIGHT VENTRICULAR END-DIASTOLIC DIMENSION: 4.43 CM
RV TISSUE DOPPLER FREE WALL SYSTOLIC VELOCITY 1 (APICAL 4 CHAMBER VIEW): 11.43 CM/S
SINUS: 4.11 CM
STJ: 3.33 CM
TDI LATERAL: 0.09 M/S
TDI SEPTAL: 0.07 M/S
TDI: 0.08 M/S
TR MAX PG: 16 MMHG
TRICUSPID ANNULAR PLANE SYSTOLIC EXCURSION: 1.85 CM
TV REST PULMONARY ARTERY PRESSURE: 19 MMHG

## 2022-11-08 PROCEDURE — 3288F FALL RISK ASSESSMENT DOCD: CPT | Mod: CPTII,S$GLB,, | Performed by: INTERNAL MEDICINE

## 2022-11-08 PROCEDURE — 3079F PR MOST RECENT DIASTOLIC BLOOD PRESSURE 80-89 MM HG: ICD-10-PCS | Mod: CPTII,S$GLB,, | Performed by: INTERNAL MEDICINE

## 2022-11-08 PROCEDURE — 1160F PR REVIEW ALL MEDS BY PRESCRIBER/CLIN PHARMACIST DOCUMENTED: ICD-10-PCS | Mod: CPTII,S$GLB,, | Performed by: INTERNAL MEDICINE

## 2022-11-08 PROCEDURE — 3077F PR MOST RECENT SYSTOLIC BLOOD PRESSURE >= 140 MM HG: ICD-10-PCS | Mod: CPTII,S$GLB,, | Performed by: INTERNAL MEDICINE

## 2022-11-08 PROCEDURE — 3008F BODY MASS INDEX DOCD: CPT | Mod: CPTII,S$GLB,, | Performed by: INTERNAL MEDICINE

## 2022-11-08 PROCEDURE — 99214 OFFICE O/P EST MOD 30 MIN: CPT | Mod: S$GLB,,, | Performed by: INTERNAL MEDICINE

## 2022-11-08 PROCEDURE — 93306 TTE W/DOPPLER COMPLETE: CPT

## 2022-11-08 PROCEDURE — 99999 PR PBB SHADOW E&M-EST. PATIENT-LVL IV: ICD-10-PCS | Mod: PBBFAC,,, | Performed by: INTERNAL MEDICINE

## 2022-11-08 PROCEDURE — 93306 TTE W/DOPPLER COMPLETE: CPT | Mod: 26,,, | Performed by: INTERNAL MEDICINE

## 2022-11-08 PROCEDURE — 4010F ACE/ARB THERAPY RXD/TAKEN: CPT | Mod: CPTII,S$GLB,, | Performed by: INTERNAL MEDICINE

## 2022-11-08 PROCEDURE — 93306 ECHO (CUPID ONLY): ICD-10-PCS | Mod: 26,,, | Performed by: INTERNAL MEDICINE

## 2022-11-08 PROCEDURE — 4010F PR ACE/ARB THEARPY RXD/TAKEN: ICD-10-PCS | Mod: CPTII,S$GLB,, | Performed by: INTERNAL MEDICINE

## 2022-11-08 PROCEDURE — 99214 PR OFFICE/OUTPT VISIT, EST, LEVL IV, 30-39 MIN: ICD-10-PCS | Mod: S$GLB,,, | Performed by: INTERNAL MEDICINE

## 2022-11-08 PROCEDURE — 1159F MED LIST DOCD IN RCRD: CPT | Mod: CPTII,S$GLB,, | Performed by: INTERNAL MEDICINE

## 2022-11-08 PROCEDURE — 1159F PR MEDICATION LIST DOCUMENTED IN MEDICAL RECORD: ICD-10-PCS | Mod: CPTII,S$GLB,, | Performed by: INTERNAL MEDICINE

## 2022-11-08 PROCEDURE — 3079F DIAST BP 80-89 MM HG: CPT | Mod: CPTII,S$GLB,, | Performed by: INTERNAL MEDICINE

## 2022-11-08 PROCEDURE — 1101F PR PT FALLS ASSESS DOC 0-1 FALLS W/OUT INJ PAST YR: ICD-10-PCS | Mod: CPTII,S$GLB,, | Performed by: INTERNAL MEDICINE

## 2022-11-08 PROCEDURE — 1160F RVW MEDS BY RX/DR IN RCRD: CPT | Mod: CPTII,S$GLB,, | Performed by: INTERNAL MEDICINE

## 2022-11-08 PROCEDURE — 1126F AMNT PAIN NOTED NONE PRSNT: CPT | Mod: CPTII,S$GLB,, | Performed by: INTERNAL MEDICINE

## 2022-11-08 PROCEDURE — 3288F PR FALLS RISK ASSESSMENT DOCUMENTED: ICD-10-PCS | Mod: CPTII,S$GLB,, | Performed by: INTERNAL MEDICINE

## 2022-11-08 PROCEDURE — 3077F SYST BP >= 140 MM HG: CPT | Mod: CPTII,S$GLB,, | Performed by: INTERNAL MEDICINE

## 2022-11-08 PROCEDURE — 3008F PR BODY MASS INDEX (BMI) DOCUMENTED: ICD-10-PCS | Mod: CPTII,S$GLB,, | Performed by: INTERNAL MEDICINE

## 2022-11-08 PROCEDURE — 99999 PR PBB SHADOW E&M-EST. PATIENT-LVL IV: CPT | Mod: PBBFAC,,, | Performed by: INTERNAL MEDICINE

## 2022-11-08 PROCEDURE — 1101F PT FALLS ASSESS-DOCD LE1/YR: CPT | Mod: CPTII,S$GLB,, | Performed by: INTERNAL MEDICINE

## 2022-11-08 PROCEDURE — 1126F PR PAIN SEVERITY QUANTIFIED, NO PAIN PRESENT: ICD-10-PCS | Mod: CPTII,S$GLB,, | Performed by: INTERNAL MEDICINE

## 2022-11-08 RX ORDER — RANOLAZINE 500 MG/1
500 TABLET, EXTENDED RELEASE ORAL 2 TIMES DAILY
Qty: 180 TABLET | Refills: 3 | Status: SHIPPED | OUTPATIENT
Start: 2022-11-08

## 2022-11-08 RX ORDER — ROSUVASTATIN CALCIUM 40 MG/1
40 TABLET, COATED ORAL DAILY
Qty: 90 TABLET | Refills: 3 | Status: SHIPPED | OUTPATIENT
Start: 2022-11-08

## 2022-11-08 RX ORDER — NEBIVOLOL 5 MG/1
5 TABLET ORAL DAILY
Qty: 90 TABLET | Refills: 3 | Status: SHIPPED | OUTPATIENT
Start: 2022-11-08 | End: 2023-08-08 | Stop reason: SDUPTHER

## 2023-04-17 ENCOUNTER — PATIENT MESSAGE (OUTPATIENT)
Dept: CARDIOLOGY | Facility: CLINIC | Age: 68
End: 2023-04-17
Payer: COMMERCIAL

## 2023-05-08 ENCOUNTER — LAB VISIT (OUTPATIENT)
Dept: LAB | Facility: HOSPITAL | Age: 68
End: 2023-05-08
Attending: NURSE PRACTITIONER
Payer: COMMERCIAL

## 2023-05-08 DIAGNOSIS — E78.2 MIXED HYPERLIPIDEMIA: ICD-10-CM

## 2023-05-08 DIAGNOSIS — I65.23 BILATERAL CAROTID ARTERY STENOSIS: ICD-10-CM

## 2023-05-08 DIAGNOSIS — E03.9 HYPOTHYROIDISM, UNSPECIFIED TYPE: ICD-10-CM

## 2023-05-08 LAB
CHOLEST SERPL-MCNC: 126 MG/DL (ref 120–199)
CHOLEST/HDLC SERPL: 3.5 {RATIO} (ref 2–5)
HDLC SERPL-MCNC: 36 MG/DL (ref 40–75)
HDLC SERPL: 28.6 % (ref 20–50)
LDLC SERPL CALC-MCNC: 61.8 MG/DL (ref 63–159)
NONHDLC SERPL-MCNC: 90 MG/DL
TRIGL SERPL-MCNC: 141 MG/DL (ref 30–150)
TSH SERPL DL<=0.005 MIU/L-ACNC: 1.45 UIU/ML (ref 0.4–4)

## 2023-05-08 PROCEDURE — 84443 ASSAY THYROID STIM HORMONE: CPT | Performed by: NURSE PRACTITIONER

## 2023-05-08 PROCEDURE — 36415 COLL VENOUS BLD VENIPUNCTURE: CPT | Mod: PO | Performed by: NURSE PRACTITIONER

## 2023-05-08 PROCEDURE — 80061 LIPID PANEL: CPT | Performed by: INTERNAL MEDICINE

## 2023-05-10 ENCOUNTER — PATIENT MESSAGE (OUTPATIENT)
Dept: CARDIOLOGY | Facility: CLINIC | Age: 68
End: 2023-05-10
Payer: COMMERCIAL

## 2023-05-11 DIAGNOSIS — I10 ESSENTIAL HYPERTENSION: ICD-10-CM

## 2023-05-11 RX ORDER — LOSARTAN POTASSIUM 100 MG/1
100 TABLET ORAL DAILY
Qty: 90 TABLET | Refills: 3 | Status: SHIPPED | OUTPATIENT
Start: 2023-05-11

## 2023-05-11 NOTE — TELEPHONE ENCOUNTER
Patient states he will stop the Plavix and changed the ASA 325mg to 81 mg.      I am working on getting a coupon or another avenue of getting his Bystolic.  He is paying full price every 3 months(approx 300 dollars)

## 2023-05-16 ENCOUNTER — OFFICE VISIT (OUTPATIENT)
Dept: CARDIOLOGY | Facility: CLINIC | Age: 68
End: 2023-05-16
Payer: COMMERCIAL

## 2023-05-16 VITALS
HEART RATE: 70 BPM | WEIGHT: 236.13 LBS | OXYGEN SATURATION: 97 % | DIASTOLIC BLOOD PRESSURE: 76 MMHG | BODY MASS INDEX: 33.81 KG/M2 | SYSTOLIC BLOOD PRESSURE: 132 MMHG | HEIGHT: 70 IN

## 2023-05-16 DIAGNOSIS — Z95.1 S/P CABG X 4: ICD-10-CM

## 2023-05-16 DIAGNOSIS — I65.23 BILATERAL CAROTID ARTERY STENOSIS: ICD-10-CM

## 2023-05-16 DIAGNOSIS — G45.3 AMAUROSIS FUGAX: ICD-10-CM

## 2023-05-16 DIAGNOSIS — I10 ESSENTIAL HYPERTENSION: ICD-10-CM

## 2023-05-16 DIAGNOSIS — E78.00 PURE HYPERCHOLESTEROLEMIA: ICD-10-CM

## 2023-05-16 DIAGNOSIS — Z95.5 S/P CORONARY ARTERY STENT PLACEMENT: ICD-10-CM

## 2023-05-16 DIAGNOSIS — E66.09 CLASS 1 OBESITY DUE TO EXCESS CALORIES WITHOUT SERIOUS COMORBIDITY WITH BODY MASS INDEX (BMI) OF 33.0 TO 33.9 IN ADULT: ICD-10-CM

## 2023-05-16 DIAGNOSIS — I25.10 CORONARY ARTERY DISEASE INVOLVING NATIVE CORONARY ARTERY OF NATIVE HEART WITHOUT ANGINA PECTORIS: Primary | ICD-10-CM

## 2023-05-16 DIAGNOSIS — I77.89 AORTIC ROOT ENLARGEMENT: ICD-10-CM

## 2023-05-16 PROBLEM — E78.2 MIXED HYPERLIPIDEMIA: Status: RESOLVED | Noted: 2021-12-15 | Resolved: 2023-05-16

## 2023-05-16 PROBLEM — R07.89 OTHER CHEST PAIN: Status: RESOLVED | Noted: 2017-02-06 | Resolved: 2023-05-16

## 2023-05-16 PROCEDURE — 3288F FALL RISK ASSESSMENT DOCD: CPT | Mod: CPTII,S$GLB,, | Performed by: PHYSICIAN ASSISTANT

## 2023-05-16 PROCEDURE — 1126F AMNT PAIN NOTED NONE PRSNT: CPT | Mod: CPTII,S$GLB,, | Performed by: PHYSICIAN ASSISTANT

## 2023-05-16 PROCEDURE — 1160F RVW MEDS BY RX/DR IN RCRD: CPT | Mod: CPTII,S$GLB,, | Performed by: PHYSICIAN ASSISTANT

## 2023-05-16 PROCEDURE — 3075F PR MOST RECENT SYSTOLIC BLOOD PRESS GE 130-139MM HG: ICD-10-PCS | Mod: CPTII,S$GLB,, | Performed by: PHYSICIAN ASSISTANT

## 2023-05-16 PROCEDURE — 99999 PR PBB SHADOW E&M-EST. PATIENT-LVL V: CPT | Mod: PBBFAC,,, | Performed by: PHYSICIAN ASSISTANT

## 2023-05-16 PROCEDURE — 3008F PR BODY MASS INDEX (BMI) DOCUMENTED: ICD-10-PCS | Mod: CPTII,S$GLB,, | Performed by: PHYSICIAN ASSISTANT

## 2023-05-16 PROCEDURE — 99999 PR PBB SHADOW E&M-EST. PATIENT-LVL V: ICD-10-PCS | Mod: PBBFAC,,, | Performed by: PHYSICIAN ASSISTANT

## 2023-05-16 PROCEDURE — 3075F SYST BP GE 130 - 139MM HG: CPT | Mod: CPTII,S$GLB,, | Performed by: PHYSICIAN ASSISTANT

## 2023-05-16 PROCEDURE — 4010F PR ACE/ARB THEARPY RXD/TAKEN: ICD-10-PCS | Mod: CPTII,S$GLB,, | Performed by: PHYSICIAN ASSISTANT

## 2023-05-16 PROCEDURE — 99214 PR OFFICE/OUTPT VISIT, EST, LEVL IV, 30-39 MIN: ICD-10-PCS | Mod: S$GLB,,, | Performed by: PHYSICIAN ASSISTANT

## 2023-05-16 PROCEDURE — 1159F MED LIST DOCD IN RCRD: CPT | Mod: CPTII,S$GLB,, | Performed by: PHYSICIAN ASSISTANT

## 2023-05-16 PROCEDURE — 4010F ACE/ARB THERAPY RXD/TAKEN: CPT | Mod: CPTII,S$GLB,, | Performed by: PHYSICIAN ASSISTANT

## 2023-05-16 PROCEDURE — 1126F PR PAIN SEVERITY QUANTIFIED, NO PAIN PRESENT: ICD-10-PCS | Mod: CPTII,S$GLB,, | Performed by: PHYSICIAN ASSISTANT

## 2023-05-16 PROCEDURE — 3078F DIAST BP <80 MM HG: CPT | Mod: CPTII,S$GLB,, | Performed by: PHYSICIAN ASSISTANT

## 2023-05-16 PROCEDURE — 99214 OFFICE O/P EST MOD 30 MIN: CPT | Mod: S$GLB,,, | Performed by: PHYSICIAN ASSISTANT

## 2023-05-16 PROCEDURE — 1159F PR MEDICATION LIST DOCUMENTED IN MEDICAL RECORD: ICD-10-PCS | Mod: CPTII,S$GLB,, | Performed by: PHYSICIAN ASSISTANT

## 2023-05-16 PROCEDURE — 3288F PR FALLS RISK ASSESSMENT DOCUMENTED: ICD-10-PCS | Mod: CPTII,S$GLB,, | Performed by: PHYSICIAN ASSISTANT

## 2023-05-16 PROCEDURE — 3078F PR MOST RECENT DIASTOLIC BLOOD PRESSURE < 80 MM HG: ICD-10-PCS | Mod: CPTII,S$GLB,, | Performed by: PHYSICIAN ASSISTANT

## 2023-05-16 PROCEDURE — 1101F PT FALLS ASSESS-DOCD LE1/YR: CPT | Mod: CPTII,S$GLB,, | Performed by: PHYSICIAN ASSISTANT

## 2023-05-16 PROCEDURE — 1160F PR REVIEW ALL MEDS BY PRESCRIBER/CLIN PHARMACIST DOCUMENTED: ICD-10-PCS | Mod: CPTII,S$GLB,, | Performed by: PHYSICIAN ASSISTANT

## 2023-05-16 PROCEDURE — 1101F PR PT FALLS ASSESS DOC 0-1 FALLS W/OUT INJ PAST YR: ICD-10-PCS | Mod: CPTII,S$GLB,, | Performed by: PHYSICIAN ASSISTANT

## 2023-05-16 PROCEDURE — 3008F BODY MASS INDEX DOCD: CPT | Mod: CPTII,S$GLB,, | Performed by: PHYSICIAN ASSISTANT

## 2023-05-16 NOTE — PROGRESS NOTES
"    General Cardiology Clinic Note  Reason for Visit: Follow up   Last Clinic Visit: 11/8/2022  General Cardiologist: Dr. Litzy Gutierres     HPI:   Preston Arzate is a 68 y.o. male who presents for follow up.     PROBLEM LIST:  CAD s/p CABG x 2 2005 (LIMA to LAD, SVG to PDA)  -PCI LM, SVG to PDA 2015  HTN  HLD  Amaurosis fugax 2018  Bilateral carotid stenosis (20-39% CHARLENE, 30-49% LICA)  REMBERTO on CPAP   Enlarged ascending aorta (4.4 cm)     Interval HPI:  Patient presents for 6 month follow up. He reports mild angina "here and there" which has been stable ever since his CABG in 2005. No new or worsening chest pain. He has not needed to take any Nitroglycerin. He reports mild FERNANDEZ that comes and goes. Occurs with walking 1/4 mile, but able to keep going. It is inconsistent. He also reports more generalized fatigue recently and headaches. He is compliant with his CPAP. He denies orthopnea, PND, pedal edema, rapid weight gain (has had 10 lb gradual weight gain). He denies syncope, lightheadedness, palpitations. He denies claudication and symptoms of TIA. He is not exercising. He does get 8-10k steps at work, but isn't doing any cardio. His BP at home is very well controlled. He takes Ranexa once every few days. If he takes it daily, he gets very constipated.     11/8/2022 HPI (Dr. Litzy Gutierres)  He has been feeling well since his last visit.  His lower extremity edema has resolved after changing carvedilol back to nebivolol.  He has not had any further chest pains.  He states he only takes 500 mg of renal is seen once a day.  He has been cutting his 1000 mg tablets in half.  He continues to take aspirin 325 mg daily as well as Plavix.  Walks a lot at work but is not exercising on a regular basis.  His weight is down about 17 lb.  He checks his blood pressure few times a week at home and states it typically runs about 119/78.  He had an echo done earlier this morning that has not yet been officially read.  His BNP " was normal.    ROS:      Review of Systems   Constitutional: Positive for malaise/fatigue and weight gain. Negative for diaphoresis and weight loss.   HENT:  Negative for nosebleeds.    Eyes:  Negative for vision loss in left eye, vision loss in right eye and visual disturbance.   Cardiovascular:  Positive for chest pain and dyspnea on exertion. Negative for claudication, irregular heartbeat, leg swelling, near-syncope, orthopnea, palpitations, paroxysmal nocturnal dyspnea and syncope.   Respiratory:  Negative for cough, shortness of breath, sleep disturbances due to breathing, snoring and wheezing.    Hematologic/Lymphatic: Negative for bleeding problem. Does not bruise/bleed easily.   Skin:  Negative for poor wound healing and rash.   Musculoskeletal:  Negative for muscle cramps and myalgias.   Gastrointestinal:  Negative for bloating, abdominal pain, diarrhea, heartburn, melena, nausea and vomiting.   Genitourinary:  Negative for hematuria and nocturia.   Neurological:  Positive for headaches. Negative for brief paralysis, dizziness, light-headedness, numbness and weakness.   Psychiatric/Behavioral:  Negative for depression.    Allergic/Immunologic: Negative for hives.     PMH:     Past Medical History:   Diagnosis Date    Bilateral carotid artery stenosis 12/15/2021    Bilateral carotid artery stenosis 12/15/2021    Coronary artery disease     Hypertension     Mixed hyperlipidemia 12/15/2021    Pure hypercholesterolemia 10/1/2018    Stroke 01/2018    amarousus fugax left    Thyroid disease      Past Surgical History:   Procedure Laterality Date    CARDIAC CATHETERIZATION  03/2015    x 2    CORONARY ANGIOPLASTY  12/15    GREGORIA X 2 left main and SVG to PDA    CORONARY ARTERY BYPASS GRAFT  07/2005    x 4    TOTAL THYROIDECTOMY       Allergies:     Review of patient's allergies indicates:   Allergen Reactions    Carvedilol      Pedal edema    Imdur [isosorbide mononitrate]      headaches     Medications:     Current  "Outpatient Medications on File Prior to Visit   Medication Sig Dispense Refill    amLODIPine (NORVASC) 5 MG tablet Take 1 tablet (5 mg total) by mouth once daily. 90 tablet 3    aspirin (ECOTRIN) 325 MG EC tablet Take 325 mg by mouth once daily.      coenzyme Q10 10 mg capsule Take 200 mg by mouth once daily.      etodolac (LODINE) 400 MG tablet Take 400 mg by mouth 2 (two) times daily.      fish oil-omega-3 fatty acids 300-1,000 mg capsule Take 1,200 g by mouth once daily.      levothyroxine (SYNTHROID) 125 MCG tablet Take 125 mcg by mouth once daily.   3    losartan (COZAAR) 100 MG tablet Take 1 tablet (100 mg total) by mouth once daily. 90 tablet 3    nebivoloL (BYSTOLIC) 5 MG Tab Take 1 tablet (5 mg total) by mouth once daily. 90 tablet 3    ranolazine (RANEXA) 500 MG Tb12 Take 1 tablet (500 mg total) by mouth 2 (two) times daily. 180 tablet 3    rosuvastatin (CRESTOR) 40 MG Tab Take 1 tablet (40 mg total) by mouth once daily. 90 tablet 3    testosterone cypionate 200 mg/mL Kit Inject 200 mg into the muscle every 14 (fourteen) days.       No current facility-administered medications on file prior to visit.     Social History:     Social History     Tobacco Use    Smoking status: Never    Smokeless tobacco: Never   Substance Use Topics    Alcohol use: No     Family History:     Family History   Problem Relation Age of Onset    Hypertension Mother     Heart attack Father     Heart disease Sister     Hypertension Sister     Heart attack Paternal Grandfather      Physical Exam:   /76 (BP Location: Left arm, Patient Position: Sitting, BP Method: Large (Automatic))   Pulse 70   Ht 5' 10" (1.778 m)   Wt 107.1 kg (236 lb 1.8 oz)   SpO2 97%   BMI 33.88 kg/m²        Physical Exam  Vitals and nursing note reviewed.   Constitutional:       General: He is not in acute distress.     Appearance: Normal appearance. He is obese.   HENT:      Head: Normocephalic and atraumatic.      Nose: Nose normal.   Eyes:      " General: No scleral icterus.     Extraocular Movements: Extraocular movements intact.      Conjunctiva/sclera: Conjunctivae normal.   Neck:      Thyroid: No thyromegaly.      Vascular: No carotid bruit or JVD.   Cardiovascular:      Rate and Rhythm: Normal rate and regular rhythm.      Pulses: Normal pulses.      Heart sounds: Normal heart sounds. No murmur heard.    No friction rub. No gallop.   Pulmonary:      Effort: Pulmonary effort is normal.      Breath sounds: Normal breath sounds. No wheezing, rhonchi or rales.   Chest:      Chest wall: No tenderness.   Abdominal:      General: Bowel sounds are normal. There is no distension.      Palpations: Abdomen is soft.      Tenderness: There is no abdominal tenderness.   Musculoskeletal:      Cervical back: Neck supple.      Right lower leg: No edema.      Left lower leg: No edema.   Skin:     General: Skin is warm and dry.      Coloration: Skin is not pale.      Findings: No erythema or rash.      Nails: There is no clubbing.   Neurological:      Mental Status: He is alert and oriented to person, place, and time.   Psychiatric:         Mood and Affect: Mood and affect normal.         Behavior: Behavior normal.        Labs:     Lab Results   Component Value Date     06/23/2022    K 4.2 06/23/2022     06/23/2022    CO2 27 06/23/2022    BUN 17 06/23/2022    CREATININE 1.4 06/23/2022    ANIONGAP 7 (L) 06/23/2022     No results found for: HGBA1C  Lab Results   Component Value Date    BNP 21 06/10/2022    Lab Results   Component Value Date    WBC 7.76 06/10/2022    HGB 15.4 06/10/2022    HCT 47.3 06/10/2022     06/10/2022    GRAN 4.5 06/05/2020    GRAN 58.8 06/05/2020     Lab Results   Component Value Date    CHOL 126 05/08/2023    HDL 36 (L) 05/08/2023    LDLCALC 61.8 (L) 05/08/2023    TRIG 141 05/08/2023          Imaging:   Echocardiograms:   TTE 11/8/2022  The left ventricle is normal in size with concentric remodeling and normal systolic  function.  The estimated ejection fraction is 65%.  Normal left ventricular diastolic function.  The ascending aorta is mildly dilated (4.16 cm)  Normal right ventricular size with normal right ventricular systolic function.  Mild left atrial enlargement.  Normal central venous pressure (3 mmHg).  The estimated PA systolic pressure is 19 mmHg.  IVC clearly collapsing, suspect non-cardiac cause of peripheral edema    TTE 2/11/2022  The left ventricle is normal in size with moderate concentric hypertrophy and normal systolic function.  The estimated ejection fraction is 55%.  Normal left ventricular diastolic function.  Normal right ventricular size with normal right ventricular systolic function.  Normal central venous pressure (3 mmHg).  The estimated PA systolic pressure is 18 mmHg.    TTE 6/5/2020  Normal left ventricular systolic function. The estimated ejection fraction is 55%.  Normal LV diastolic function.  No wall motion abnormalities.  Normal right ventricular systolic function.  Mild right ventricular enlargement.  Mild left atrial enlargement.  Intermediate central venous pressure (8 mmHg).  The aortic root is mildly dilated.  The study quality was technically difficult. The study was difficult due to patient's body habitus.    Stress Tests:   PET Stress Test 6/22/2020    There is a very small sized, mild intensity mid inferior reversible perfusion abnormality in the distribution of thePDA indicative of focal coronary stenosis.    Within the perfusion abnormality absolute myocardial perfusion (cc/min/gm) averaged 0.60 cc/min/g at rest, 0.88 cc/min/g at stress and CFR was 1.46 cc/min/g, which equates to mildly reduced coronary flow capacity (Green) in 6% of the myocardium (within the PDA territory) .    Whole heart absolute myocardial perfusion (cc/min/g) averaged 0.75 cc/min/g at rest, 1.40 cc/min/g at stress, and 1.88  CFR.    Gated perfusion images showed an ejection fraction of 74% at rest and 75%  during stress. Normal ejection fraction is greater than 51%.    Wall motion was normal at rest and during stress.    LV cavity size is normal at rest and stress.    The EKG portion of this study is abnormal but not diagnostic.    There were no arrhythmias during stress.    The patient reported no chest pain during the stress test.    When compared to the prior study on 5/14/2018, absolute stress flow and coronary flow capacity has improved in all territories.  These findings are most consistent with either physiologic improvement of CAD or likely caffeine effect on the prior study.  Overall, the perfusion patterns are visually very similar.    PET Stress test 5/14/2018  CONCLUSIONS: ABNORMAL MYOCARDIAL PERFUSION PET STRESS TEST   1. There is a very small sized mild ischemia in the mid inferoseptal wall in the usual distribution of the Posterior Descending Artery. This defect comprises 5 % of the left ventricular myocardium.   2. There is a very small sized mild ischemia in the basilar lateral wall in the usual distribution of the Left Circumflex Artery. This defect comprises 5 % of the left ventricular myocardium.   3. Resting wall motion is physiologic. Stress wall motion is physiologic.   4. LV function is normal at rest and stress.  (normal is >= 51%)   5. The ventricular volumes are normal at rest and stress.   6. The extracardiac distribution of radioactivity is normal.   7. When compared to the previous study from 12/08/2015, the lateral wall has significantly improved.  There is also a very small region of ischemia in the PDA territory.     Caths:   [facility] 12/9/2015  1. LVEDP=15mmHg   2. LVEF=65%   3. Patent LIMA to LAD   4. Patent SVG to D1   5. Occluded SVG to OM1   6. Tandem 95% and 75% stenoses of SVG to PDA   7. 95% stenosis of distal LM   8. Small caliber diffusely diseased LCx and OM1   9. Successful PCI of SVG to PDA with GREGORIA   10. Successful PCI of distal LM with GREGORIA     Other:  Event monitor  1/11/2018  In summary this patient's monitor is consistent with sinus rhythm and a single PVC. There was no atrial fibrillation while the device was in use.     Carotid ultrasound 1/10/2018  There is 20 - 39% right Internal Carotid stenosis.   There is 40 - 49% left Internal Carotid stenosis.     EKG 12/15/2021: NSR, anterior TWIs    Assessment:     1. Coronary artery disease involving native coronary artery of native heart without angina pectoris    2. S/P CABG x 4    3. S/P coronary artery stent placement    4. Essential hypertension    5. Pure hypercholesterolemia    6. Bilateral carotid artery stenosis    7. Amaurosis fugax    8. Aortic root enlargement    9. Class 1 obesity due to excess calories without serious comorbidity with body mass index (BMI) of 33.0 to 33.9 in adult      Plan:     CAD s/p CABG x 2 2005 (LIMA to LAD, SVG to PDA), s/p PCI LM, SVG to PDA 2015  Stable angina.   Continue ASA and high intensity statin   Increase aerobic exercise with a goal of at least 30 minutes, 5 days a week.    HTN  Well controlled at home  Continue Amlodipine 5 mg daily  Continue Bystolic 5 mg daily   Continue Losartan 100 mg daily    HLD  LDL at goal on Crestor 40 mg daily     Bilateral carotid stenosis (20-39% CHARLENE, 30-49% LICA)  Amaurosis fugax 2018  He had a carotid ultrasound ordered last visit but it was not done. Will schedule today  Asymptomatic. Continue ASA and statin.     REMBERTO on cpap  Compliant with CPAP     Enlarged ascending aorta   Ascending aortic measured 4.16 cm on echo last year.     Obesity   Likely contributing to FERNANDEZ. Refer to bariatric medicine.       Follow up in one year.     Signed:  Pippa Kirby PA-C  Cardiology   [phone] - General  5/16/2023 8:36 AM

## 2023-05-17 ENCOUNTER — PATIENT MESSAGE (OUTPATIENT)
Dept: CARDIOLOGY | Facility: CLINIC | Age: 68
End: 2023-05-17
Payer: COMMERCIAL

## 2023-08-08 DIAGNOSIS — I10 ESSENTIAL HYPERTENSION: ICD-10-CM

## 2023-08-08 DIAGNOSIS — I25.10 CORONARY ARTERY DISEASE INVOLVING NATIVE CORONARY ARTERY OF NATIVE HEART WITHOUT ANGINA PECTORIS: ICD-10-CM

## 2023-08-08 RX ORDER — NEBIVOLOL 5 MG/1
5 TABLET ORAL DAILY
Qty: 90 TABLET | Refills: 3 | OUTPATIENT
Start: 2023-08-08 | End: 2024-08-07

## 2023-08-08 RX ORDER — AMLODIPINE BESYLATE 5 MG/1
5 TABLET ORAL DAILY
Qty: 90 TABLET | Refills: 3 | Status: SHIPPED | OUTPATIENT
Start: 2023-08-08

## 2024-04-21 DIAGNOSIS — I10 ESSENTIAL HYPERTENSION: ICD-10-CM

## 2024-04-22 RX ORDER — LOSARTAN POTASSIUM 100 MG/1
100 TABLET ORAL
Qty: 90 TABLET | Refills: 3 | Status: SHIPPED | OUTPATIENT
Start: 2024-04-22

## 2024-06-04 ENCOUNTER — OFFICE VISIT (OUTPATIENT)
Dept: CARDIOLOGY | Facility: CLINIC | Age: 69
End: 2024-06-04

## 2024-06-04 VITALS
WEIGHT: 236.31 LBS | SYSTOLIC BLOOD PRESSURE: 118 MMHG | BODY MASS INDEX: 33.83 KG/M2 | HEIGHT: 70 IN | HEART RATE: 76 BPM | DIASTOLIC BLOOD PRESSURE: 67 MMHG

## 2024-06-04 DIAGNOSIS — I25.10 CORONARY ARTERY DISEASE INVOLVING NATIVE CORONARY ARTERY OF NATIVE HEART WITHOUT ANGINA PECTORIS: Primary | ICD-10-CM

## 2024-06-04 DIAGNOSIS — I20.89 STABLE ANGINA PECTORIS: ICD-10-CM

## 2024-06-04 DIAGNOSIS — E78.00 PURE HYPERCHOLESTEROLEMIA: ICD-10-CM

## 2024-06-04 DIAGNOSIS — Z95.5 S/P CORONARY ARTERY STENT PLACEMENT: ICD-10-CM

## 2024-06-04 DIAGNOSIS — I77.89 AORTIC ROOT ENLARGEMENT: ICD-10-CM

## 2024-06-04 DIAGNOSIS — Z95.1 S/P CABG X 4: ICD-10-CM

## 2024-06-04 DIAGNOSIS — I10 ESSENTIAL HYPERTENSION: ICD-10-CM

## 2024-06-04 DIAGNOSIS — N18.31 STAGE 3A CHRONIC KIDNEY DISEASE: ICD-10-CM

## 2024-06-04 DIAGNOSIS — I65.23 BILATERAL CAROTID ARTERY STENOSIS: ICD-10-CM

## 2024-06-04 DIAGNOSIS — I25.119 CORONARY ARTERY DISEASE INVOLVING NATIVE CORONARY ARTERY OF NATIVE HEART WITH ANGINA PECTORIS: ICD-10-CM

## 2024-06-04 PROCEDURE — 99214 OFFICE O/P EST MOD 30 MIN: CPT | Mod: S$PBB,,, | Performed by: PHYSICIAN ASSISTANT

## 2024-06-04 PROCEDURE — 99999 PR PBB SHADOW E&M-EST. PATIENT-LVL IV: CPT | Mod: PBBFAC,,, | Performed by: PHYSICIAN ASSISTANT

## 2024-06-04 PROCEDURE — 99214 OFFICE O/P EST MOD 30 MIN: CPT | Mod: PBBFAC | Performed by: PHYSICIAN ASSISTANT

## 2024-06-04 RX ORDER — ROSUVASTATIN CALCIUM 20 MG/1
20 TABLET, COATED ORAL DAILY
Qty: 90 TABLET | Refills: 3 | Status: SHIPPED | OUTPATIENT
Start: 2024-06-04 | End: 2025-05-30

## 2024-06-04 RX ORDER — EZETIMIBE 10 MG/1
10 TABLET ORAL DAILY
Qty: 90 TABLET | Refills: 3 | Status: SHIPPED | OUTPATIENT
Start: 2024-06-04 | End: 2025-06-04

## 2024-06-04 NOTE — PROGRESS NOTES
"    General Cardiology Clinic Note  Reason for Visit: Back pain   Last Clinic Visit: 5/16/2023  General Cardiologist: Dr. Litzy Gutierres     HPI:   Preston Arzate is a 69 y.o. male who presents for follow up.     PROBLEM LIST:  CAD s/p CABG x 2 2005 (LIMA to LAD, SVG to PDA)  -PCI LM, SVG to PDA 2015  Enlarged ascending aorta (4.4 cm)  HTN  HLD  Amaurosis fugax 2018  Bilateral carotid stenosis (20-39% CHARLENE, 30-49% LICA)  REMBERTO on CPAP   CKD Stage 3     Interval HPI:  Patient presents for back pain, SOB. He states he has been noticing a deep ache in his central upper back that comes and goes for the past few weeks. Symptoms remind him of his prior angina. He states it seems to occur when stressed and is happening daily. He also reports more SOB which seems fairly random and inconsistent. He has not been taking Nitro. He takes Ranexa PRN. He denies syncope, near syncope, palpitations, edema. He is not exercising, but does walk a lot at work, usually 8k steps daily. He states he cannot tolerate taking the Rosuvastatin daily. He will take it for 3-4 nights, but then will develop leg cramps, so he will have to take a breaks for a few nights.     5/16/2023 HPI  Patient presents for 6 month follow up. He reports mild angina "here and there" which has been stable ever since his CABG in 2005. No new or worsening chest pain. He has not needed to take any Nitroglycerin. He reports mild FERNANDEZ that comes and goes. Occurs with walking 1/4 mile, but able to keep going. It is inconsistent. He also reports more generalized fatigue recently and headaches. He is compliant with his CPAP. He denies orthopnea, PND, pedal edema, rapid weight gain (has had 10 lb gradual weight gain). He denies syncope, lightheadedness, palpitations. He denies claudication and symptoms of TIA. He is not exercising. He does get 8-10k steps at work, but isn't doing any cardio. His BP at home is very well controlled. He takes Ranexa once every few days. If " he takes it daily, he gets very constipated.     11/8/2022 HPI (Dr. Litzy Gutierres)  He has been feeling well since his last visit.  His lower extremity edema has resolved after changing carvedilol back to nebivolol.  He has not had any further chest pains.  He states he only takes 500 mg of renal is seen once a day.  He has been cutting his 1000 mg tablets in half.  He continues to take aspirin 325 mg daily as well as Plavix.  Walks a lot at work but is not exercising on a regular basis.  His weight is down about 17 lb.  He checks his blood pressure few times a week at home and states it typically runs about 119/78.  He had an echo done earlier this morning that has not yet been officially read.  His BNP was normal.    ROS:      Review of Systems   Constitutional: Positive for malaise/fatigue. Negative for diaphoresis, weight gain and weight loss.   HENT:  Negative for nosebleeds.    Eyes:  Negative for vision loss in left eye, vision loss in right eye and visual disturbance.   Cardiovascular:  Positive for chest pain and dyspnea on exertion. Negative for claudication, irregular heartbeat, leg swelling, near-syncope, orthopnea, palpitations, paroxysmal nocturnal dyspnea and syncope.   Respiratory:  Positive for shortness of breath. Negative for cough, sleep disturbances due to breathing, snoring and wheezing.    Hematologic/Lymphatic: Negative for bleeding problem. Does not bruise/bleed easily.   Skin:  Negative for poor wound healing and rash.   Musculoskeletal:  Positive for back pain and muscle cramps. Negative for myalgias.   Gastrointestinal:  Negative for bloating, abdominal pain, diarrhea, heartburn, melena, nausea and vomiting.   Genitourinary:  Negative for hematuria and nocturia.   Neurological:  Negative for brief paralysis, dizziness, headaches, light-headedness, numbness and weakness.   Psychiatric/Behavioral:  Negative for depression.    Allergic/Immunologic: Negative for hives.       PMH:     Past  Medical History:   Diagnosis Date    Bilateral carotid artery stenosis 12/15/2021    Bilateral carotid artery stenosis 12/15/2021    Coronary artery disease     Hypertension     Mixed hyperlipidemia 12/15/2021    Pure hypercholesterolemia 10/1/2018    Stroke 01/2018    amarousus fugax left    Thyroid disease      Past Surgical History:   Procedure Laterality Date    CARDIAC CATHETERIZATION  03/2015    x 2    CORONARY ANGIOPLASTY  12/15    GREGORIA X 2 left main and SVG to PDA    CORONARY ARTERY BYPASS GRAFT  07/2005    x 4    TOTAL THYROIDECTOMY       Allergies:     Review of patient's allergies indicates:   Allergen Reactions    Carvedilol      Pedal edema    Imdur [isosorbide mononitrate]      headaches     Medications:     Current Outpatient Medications on File Prior to Visit   Medication Sig Dispense Refill    amLODIPine (NORVASC) 5 MG tablet Take 1 tablet (5 mg total) by mouth once daily. 90 tablet 3    aspirin (ASPIR-81 ORAL) Take by mouth.      coenzyme Q10 10 mg capsule Take 200 mg by mouth once daily.      etodolac (LODINE) 400 MG tablet Take 400 mg by mouth 2 (two) times daily.      fish oil-omega-3 fatty acids 300-1,000 mg capsule Take 1,200 g by mouth once daily.      levothyroxine (SYNTHROID) 125 MCG tablet Take 125 mcg by mouth once daily.   3    losartan (COZAAR) 100 MG tablet TAKE 1 TABLET(100 MG) BY MOUTH EVERY DAY 90 tablet 3    nebivoloL (BYSTOLIC) 5 MG Tab Take 1 tablet (5 mg total) by mouth once daily. 90 tablet 3    ranolazine (RANEXA) 500 MG Tb12 Take 1 tablet (500 mg total) by mouth 2 (two) times daily. 180 tablet 3    rosuvastatin (CRESTOR) 40 MG Tab Take 1 tablet (40 mg total) by mouth once daily. 90 tablet 3    testosterone cypionate 200 mg/mL Kit Inject 200 mg into the muscle every 14 (fourteen) days.       No current facility-administered medications on file prior to visit.     Social History:     Social History     Tobacco Use    Smoking status: Never    Smokeless tobacco: Never   Substance  "Use Topics    Alcohol use: No     Family History:     Family History   Problem Relation Name Age of Onset    Hypertension Mother      Heart attack Father      Heart disease Sister      Hypertension Sister      Heart attack Paternal Grandfather       Physical Exam:   /67   Pulse 76   Ht 5' 10" (1.778 m)   Wt 107.2 kg (236 lb 5.3 oz)   BMI 33.91 kg/m²        Physical Exam  Vitals and nursing note reviewed.   Constitutional:       General: He is not in acute distress.     Appearance: Normal appearance. He is obese.   HENT:      Head: Normocephalic and atraumatic.      Nose: Nose normal.   Eyes:      General: No scleral icterus.     Extraocular Movements: Extraocular movements intact.      Conjunctiva/sclera: Conjunctivae normal.   Neck:      Thyroid: No thyromegaly.      Vascular: No carotid bruit or JVD.   Cardiovascular:      Rate and Rhythm: Normal rate and regular rhythm.      Pulses: Normal pulses.      Heart sounds: Normal heart sounds. No murmur heard.     No friction rub. No gallop.   Pulmonary:      Effort: Pulmonary effort is normal.      Breath sounds: Normal breath sounds. No wheezing, rhonchi or rales.   Chest:      Chest wall: No tenderness.   Abdominal:      General: Bowel sounds are normal. There is no distension.      Palpations: Abdomen is soft.      Tenderness: There is no abdominal tenderness.   Musculoskeletal:      Cervical back: Neck supple.      Right lower leg: No edema.      Left lower leg: No edema.   Skin:     General: Skin is warm and dry.      Coloration: Skin is not pale.      Findings: No erythema or rash.      Nails: There is no clubbing.   Neurological:      Mental Status: He is alert and oriented to person, place, and time.   Psychiatric:         Mood and Affect: Mood and affect normal.         Behavior: Behavior normal.          Labs:     Lab Results   Component Value Date     06/23/2022    K 4.2 06/23/2022     06/23/2022    CO2 27 06/23/2022    BUN 17 " "06/23/2022    CREATININE 1.4 06/23/2022    ANIONGAP 7 (L) 06/23/2022     No results found for: "HGBA1C"  Lab Results   Component Value Date    BNP 21 06/10/2022    Lab Results   Component Value Date    WBC 7.76 06/10/2022    HGB 15.4 06/10/2022    HCT 47.3 06/10/2022     06/10/2022    GRAN 4.5 06/05/2020    GRAN 58.8 06/05/2020     Lab Results   Component Value Date    CHOL 126 05/08/2023    HDL 36 (L) 05/08/2023    LDLCALC 61.8 (L) 05/08/2023    TRIG 141 05/08/2023          Imaging:   Echocardiograms:   TTE 11/8/2022  The left ventricle is normal in size with concentric remodeling and normal systolic function.  The estimated ejection fraction is 65%.  Normal left ventricular diastolic function.  The ascending aorta is mildly dilated (4.16 cm)  Normal right ventricular size with normal right ventricular systolic function.  Mild left atrial enlargement.  Normal central venous pressure (3 mmHg).  The estimated PA systolic pressure is 19 mmHg.  IVC clearly collapsing, suspect non-cardiac cause of peripheral edema    TTE 2/11/2022  The left ventricle is normal in size with moderate concentric hypertrophy and normal systolic function.  The estimated ejection fraction is 55%.  Normal left ventricular diastolic function.  Normal right ventricular size with normal right ventricular systolic function.  Normal central venous pressure (3 mmHg).  The estimated PA systolic pressure is 18 mmHg.    TTE 6/5/2020  Normal left ventricular systolic function. The estimated ejection fraction is 55%.  Normal LV diastolic function.  No wall motion abnormalities.  Normal right ventricular systolic function.  Mild right ventricular enlargement.  Mild left atrial enlargement.  Intermediate central venous pressure (8 mmHg).  The aortic root is mildly dilated.  The study quality was technically difficult. The study was difficult due to patient's body habitus.    Stress Tests:   PET Stress Test 6/22/2020    There is a very small sized, " mild intensity mid inferior reversible perfusion abnormality in the distribution of thePDA indicative of focal coronary stenosis.    Within the perfusion abnormality absolute myocardial perfusion (cc/min/gm) averaged 0.60 cc/min/g at rest, 0.88 cc/min/g at stress and CFR was 1.46 cc/min/g, which equates to mildly reduced coronary flow capacity (Green) in 6% of the myocardium (within the PDA territory) .    Whole heart absolute myocardial perfusion (cc/min/g) averaged 0.75 cc/min/g at rest, 1.40 cc/min/g at stress, and 1.88  CFR.    Gated perfusion images showed an ejection fraction of 74% at rest and 75% during stress. Normal ejection fraction is greater than 51%.    Wall motion was normal at rest and during stress.    LV cavity size is normal at rest and stress.    The EKG portion of this study is abnormal but not diagnostic.    There were no arrhythmias during stress.    The patient reported no chest pain during the stress test.    When compared to the prior study on 5/14/2018, absolute stress flow and coronary flow capacity has improved in all territories.  These findings are most consistent with either physiologic improvement of CAD or likely caffeine effect on the prior study.  Overall, the perfusion patterns are visually very similar.    PET Stress test 5/14/2018  CONCLUSIONS: ABNORMAL MYOCARDIAL PERFUSION PET STRESS TEST   1. There is a very small sized mild ischemia in the mid inferoseptal wall in the usual distribution of the Posterior Descending Artery. This defect comprises 5 % of the left ventricular myocardium.   2. There is a very small sized mild ischemia in the basilar lateral wall in the usual distribution of the Left Circumflex Artery. This defect comprises 5 % of the left ventricular myocardium.   3. Resting wall motion is physiologic. Stress wall motion is physiologic.   4. LV function is normal at rest and stress.  (normal is >= 51%)   5. The ventricular volumes are normal at rest and stress.    6. The extracardiac distribution of radioactivity is normal.   7. When compared to the previous study from 12/08/2015, the lateral wall has significantly improved.  There is also a very small region of ischemia in the PDA territory.     Caths:   [facility] 12/9/2015  1. LVEDP=15mmHg   2. LVEF=65%   3. Patent LIMA to LAD   4. Patent SVG to D1   5. Occluded SVG to OM1   6. Tandem 95% and 75% stenoses of SVG to PDA   7. 95% stenosis of distal LM   8. Small caliber diffusely diseased LCx and OM1   9. Successful PCI of SVG to PDA with GREGORIA   10. Successful PCI of distal LM with GREGORIA     Other:  Event monitor 1/11/2018  In summary this patient's monitor is consistent with sinus rhythm and a single PVC. There was no atrial fibrillation while the device was in use.     Carotid ultrasound 1/10/2018  There is 20 - 39% right Internal Carotid stenosis.   There is 40 - 49% left Internal Carotid stenosis.     EKG 12/15/2021: NSR, anterior TWIs    Assessment:     1. Coronary artery disease involving native coronary artery of native heart without angina pectoris    2. S/P coronary artery stent placement    3. S/P CABG x 4    4. Essential hypertension    5. Pure hypercholesterolemia    6. Aortic root enlargement    7. Bilateral carotid artery stenosis    8. Stage 3a chronic kidney disease        Plan:     CAD s/p CABG x 2 2005 (LIMA to LAD, SVG to PDA), s/p PCI LM, SVG to PDA 2015  Patient reporting back pain reminiscent of prior anginal equivalent. Obtain PET stress test.    Continue ASA and high intensity statin   Continue Ranexa  Increase aerobic exercise with a goal of at least 30 minutes, 5 days a week.    HTN  Well controlled   Continue Amlodipine 5 mg daily  Continue Losartan 100 mg daily    HLD  LDL 90 on outside labs. He is unable to tolerate the Rosuvastatin 40 due to cramps  Decrease Rosuvastatin to 20 mg and add on Zetia 10 mg daily     Bilateral carotid stenosis (20-39% CHARLENE, 30-49% LICA)  Amaurosis fugax 2018  Asymptomatic.  Continue ASA and statin.   Obtain carotid ultrasound     REMBERTO on cpap  Compliant with CPAP     Enlarged ascending aorta   Ascending aortic measured 4.16 cm on echo last year.     Obesity   Following a heart health diet such as the Mediterranean Diet is recommended in addition to 150 minutes a week of moderate intensity exercise to lower cholesterol, maintain a healthy body weight, and improve overall cardiovascular health.        Follow up depending on test results.     Signed:  Pippa Kirby PA-C  Cardiology   [phone] - General  6/4/2024 8:36 AM

## 2024-07-05 ENCOUNTER — HOSPITAL ENCOUNTER (INPATIENT)
Facility: HOSPITAL | Age: 69
LOS: 2 days | Discharge: HOME | DRG: 282 | End: 2024-07-08
Attending: STUDENT IN AN ORGANIZED HEALTH CARE EDUCATION/TRAINING PROGRAM | Admitting: INTERNAL MEDICINE
Payer: COMMERCIAL

## 2024-07-05 ENCOUNTER — CLINICAL SUPPORT (OUTPATIENT)
Dept: CARDIOLOGY | Facility: HOSPITAL | Age: 69
DRG: 282 | End: 2024-07-05
Attending: STUDENT IN AN ORGANIZED HEALTH CARE EDUCATION/TRAINING PROGRAM
Payer: COMMERCIAL

## 2024-07-05 VITALS — HEIGHT: 70 IN | BODY MASS INDEX: 32.93 KG/M2 | WEIGHT: 230 LBS

## 2024-07-05 DIAGNOSIS — I20.0 UNSTABLE ANGINA: Primary | ICD-10-CM

## 2024-07-05 DIAGNOSIS — I25.119 CHEST PAIN DUE TO CAD: ICD-10-CM

## 2024-07-05 DIAGNOSIS — R79.89 ELEVATED TROPONIN: ICD-10-CM

## 2024-07-05 DIAGNOSIS — R07.9 CHEST PAIN: ICD-10-CM

## 2024-07-05 DIAGNOSIS — I21.4 NON-ST ELEVATION MYOCARDIAL INFARCTION (NSTEMI): ICD-10-CM

## 2024-07-05 DIAGNOSIS — I25.10 CAD (CORONARY ARTERY DISEASE): ICD-10-CM

## 2024-07-05 DIAGNOSIS — Z01.818 PRE-OP TESTING: ICD-10-CM

## 2024-07-05 PROBLEM — M54.9 BACK PAIN: Status: ACTIVE | Noted: 2024-07-05

## 2024-07-05 LAB
ALBUMIN SERPL BCP-MCNC: 4 G/DL (ref 3.5–5.2)
ALP SERPL-CCNC: 66 U/L (ref 55–135)
ALT SERPL W/O P-5'-P-CCNC: 40 U/L (ref 10–44)
ANION GAP SERPL CALC-SCNC: 7 MMOL/L (ref 8–16)
AORTIC ROOT ANNULUS: 4.2 CM
AORTIC VALVE CUSP SEPERATION: 2.1 CM
APTT PPP: 25.4 SEC (ref 21–32)
APTT PPP: 32.6 SEC (ref 21–32)
AST SERPL-CCNC: 24 U/L (ref 10–40)
AV INDEX (PROSTH): 0.95
AV MEAN GRADIENT: 4 MMHG
AV PEAK GRADIENT: 7 MMHG
AV VALVE AREA BY VELOCITY RATIO: 2.93 CM²
AV VALVE AREA: 2.99 CM²
AV VELOCITY RATIO: 0.93
BASOPHILS # BLD AUTO: 0.08 K/UL (ref 0–0.2)
BASOPHILS NFR BLD: 1.1 % (ref 0–1.9)
BILIRUB SERPL-MCNC: 0.4 MG/DL (ref 0.1–1)
BNP SERPL-MCNC: 14 PG/ML (ref 0–99)
BSA FOR ECHO PROCEDURE: 2.27 M2
BUN SERPL-MCNC: 17 MG/DL (ref 8–23)
CALCIUM SERPL-MCNC: 9 MG/DL (ref 8.7–10.5)
CHLORIDE SERPL-SCNC: 109 MMOL/L (ref 95–110)
CO2 SERPL-SCNC: 25 MMOL/L (ref 23–29)
CREAT SERPL-MCNC: 1.3 MG/DL (ref 0.5–1.4)
CV ECHO LV RWT: 0.76 CM
DIFFERENTIAL METHOD BLD: ABNORMAL
DOP CALC AO PEAK VEL: 1.33 M/S
DOP CALC AO VTI: 24.8 CM
DOP CALC LVOT AREA: 3.1 CM2
DOP CALC LVOT DIAMETER: 2 CM
DOP CALC LVOT PEAK VEL: 1.24 M/S
DOP CALC LVOT STROKE VOLUME: 74.1 CM3
DOP CALC MV VTI: 38.9 CM
DOP CALCLVOT PEAK VEL VTI: 23.6 CM
E WAVE DECELERATION TIME: 246 MSEC
E/A RATIO: 0.98
E/E' RATIO: 10.5 M/S
ECHO LV POSTERIOR WALL: 1.42 CM (ref 0.6–1.1)
EOSINOPHIL # BLD AUTO: 0.2 K/UL (ref 0–0.5)
EOSINOPHIL NFR BLD: 2.3 % (ref 0–8)
ERYTHROCYTE [DISTWIDTH] IN BLOOD BY AUTOMATED COUNT: 15 % (ref 11.5–14.5)
EST. GFR  (NO RACE VARIABLE): 59.5 ML/MIN/1.73 M^2
FRACTIONAL SHORTENING: 20 % (ref 28–44)
GLUCOSE SERPL-MCNC: 105 MG/DL (ref 70–110)
HCT VFR BLD AUTO: 44.1 % (ref 40–54)
HGB BLD-MCNC: 14 G/DL (ref 14–18)
IMM GRANULOCYTES # BLD AUTO: 0.03 K/UL (ref 0–0.04)
IMM GRANULOCYTES NFR BLD AUTO: 0.4 % (ref 0–0.5)
INR PPP: 1 (ref 0.8–1.2)
INTERVENTRICULAR SEPTUM: 1.78 CM (ref 0.6–1.1)
IVC DIAMETER: 0.95 CM
LEFT INTERNAL DIMENSION IN SYSTOLE: 3.01 CM (ref 2.1–4)
LEFT VENTRICLE DIASTOLIC VOLUME INDEX: 27.04 ML/M2
LEFT VENTRICLE DIASTOLIC VOLUME: 60.02 ML
LEFT VENTRICLE MASS INDEX: 106 G/M2
LEFT VENTRICLE SYSTOLIC VOLUME INDEX: 15.9 ML/M2
LEFT VENTRICLE SYSTOLIC VOLUME: 35.29 ML
LEFT VENTRICULAR INTERNAL DIMENSION IN DIASTOLE: 3.75 CM (ref 3.5–6)
LEFT VENTRICULAR MASS: 236.03 G
LV LATERAL E/E' RATIO: 9.33 M/S
LV SEPTAL E/E' RATIO: 12 M/S
LVED V (TEICH): 60.02 ML
LVES V (TEICH): 35.29 ML
LVOT MG: 3 MMHG
LVOT MV: 0.78 CM/S
LYMPHOCYTES # BLD AUTO: 1.8 K/UL (ref 1–4.8)
LYMPHOCYTES NFR BLD: 23.6 % (ref 18–48)
MAGNESIUM SERPL-MCNC: 2 MG/DL (ref 1.6–2.6)
MCH RBC QN AUTO: 29.5 PG (ref 27–31)
MCHC RBC AUTO-ENTMCNC: 31.7 G/DL (ref 32–36)
MCV RBC AUTO: 93 FL (ref 82–98)
MONOCYTES # BLD AUTO: 0.8 K/UL (ref 0.3–1)
MONOCYTES NFR BLD: 10 % (ref 4–15)
MV MEAN GRADIENT: 2 MMHG
MV PEAK A VEL: 0.86 M/S
MV PEAK E VEL: 0.84 M/S
MV PEAK GRADIENT: 5 MMHG
MV STENOSIS PRESSURE HALF TIME: 99 MS
MV VALVE AREA BY CONTINUITY EQUATION: 1.9 CM2
MV VALVE AREA P 1/2 METHOD: 2.22 CM2
NEUTROPHILS # BLD AUTO: 4.7 K/UL (ref 1.8–7.7)
NEUTROPHILS NFR BLD: 62.6 % (ref 38–73)
NRBC BLD-RTO: 0 /100 WBC
PLATELET # BLD AUTO: 254 K/UL (ref 150–450)
PMV BLD AUTO: 10.2 FL (ref 9.2–12.9)
POTASSIUM SERPL-SCNC: 4.3 MMOL/L (ref 3.5–5.1)
PROT SERPL-MCNC: 7.4 G/DL (ref 6–8.4)
PROTHROMBIN TIME: 11.1 SEC (ref 9–12.5)
PV MV: 0.67 M/S
PV PEAK GRADIENT: 4 MMHG
PV PEAK VELOCITY: 0.96 M/S
RBC # BLD AUTO: 4.75 M/UL (ref 4.6–6.2)
SODIUM SERPL-SCNC: 141 MMOL/L (ref 136–145)
TDI LATERAL: 0.09 M/S
TDI SEPTAL: 0.07 M/S
TDI: 0.08 M/S
TRICUSPID ANNULAR PLANE SYSTOLIC EXCURSION: 1.02 CM
TROPONIN I SERPL HS-MCNC: 202.2 PG/ML (ref 0–14.9)
TROPONIN I SERPL HS-MCNC: 267 PG/ML (ref 0–14.9)
TROPONIN I SERPL HS-MCNC: 37.5 PG/ML (ref 0–14.9)
TROPONIN I SERPL HS-MCNC: 372.8 PG/ML (ref 0–14.9)
WBC # BLD AUTO: 7.49 K/UL (ref 3.9–12.7)
Z-SCORE OF LEFT VENTRICULAR DIMENSION IN END DIASTOLE: -7.26
Z-SCORE OF LEFT VENTRICULAR DIMENSION IN END SYSTOLE: -3.53

## 2024-07-05 PROCEDURE — 93010 ELECTROCARDIOGRAM REPORT: CPT | Mod: ,,, | Performed by: GENERAL PRACTICE

## 2024-07-05 PROCEDURE — 36415 COLL VENOUS BLD VENIPUNCTURE: CPT | Performed by: NURSE PRACTITIONER

## 2024-07-05 PROCEDURE — 85610 PROTHROMBIN TIME: CPT | Performed by: NURSE PRACTITIONER

## 2024-07-05 PROCEDURE — G0378 HOSPITAL OBSERVATION PER HR: HCPCS

## 2024-07-05 PROCEDURE — 83880 ASSAY OF NATRIURETIC PEPTIDE: CPT | Performed by: STUDENT IN AN ORGANIZED HEALTH CARE EDUCATION/TRAINING PROGRAM

## 2024-07-05 PROCEDURE — 63600175 PHARM REV CODE 636 W HCPCS: Performed by: NURSE PRACTITIONER

## 2024-07-05 PROCEDURE — 94761 N-INVAS EAR/PLS OXIMETRY MLT: CPT

## 2024-07-05 PROCEDURE — 80053 COMPREHEN METABOLIC PANEL: CPT | Performed by: STUDENT IN AN ORGANIZED HEALTH CARE EDUCATION/TRAINING PROGRAM

## 2024-07-05 PROCEDURE — 99285 EMERGENCY DEPT VISIT HI MDM: CPT | Mod: 25

## 2024-07-05 PROCEDURE — 85730 THROMBOPLASTIN TIME PARTIAL: CPT | Performed by: NURSE PRACTITIONER

## 2024-07-05 PROCEDURE — 93005 ELECTROCARDIOGRAM TRACING: CPT | Performed by: GENERAL PRACTICE

## 2024-07-05 PROCEDURE — 25000003 PHARM REV CODE 250: Performed by: NURSE PRACTITIONER

## 2024-07-05 PROCEDURE — 84484 ASSAY OF TROPONIN QUANT: CPT | Mod: 91 | Performed by: NURSE PRACTITIONER

## 2024-07-05 PROCEDURE — 99214 OFFICE O/P EST MOD 30 MIN: CPT | Mod: ,,, | Performed by: INTERNAL MEDICINE

## 2024-07-05 PROCEDURE — 94660 CPAP INITIATION&MGMT: CPT

## 2024-07-05 PROCEDURE — 85730 THROMBOPLASTIN TIME PARTIAL: CPT | Mod: 91 | Performed by: HOSPITALIST

## 2024-07-05 PROCEDURE — 93306 TTE W/DOPPLER COMPLETE: CPT | Mod: 26,,, | Performed by: GENERAL PRACTICE

## 2024-07-05 PROCEDURE — 85025 COMPLETE CBC W/AUTO DIFF WBC: CPT | Performed by: STUDENT IN AN ORGANIZED HEALTH CARE EDUCATION/TRAINING PROGRAM

## 2024-07-05 PROCEDURE — 83735 ASSAY OF MAGNESIUM: CPT | Performed by: STUDENT IN AN ORGANIZED HEALTH CARE EDUCATION/TRAINING PROGRAM

## 2024-07-05 PROCEDURE — 93306 TTE W/DOPPLER COMPLETE: CPT

## 2024-07-05 PROCEDURE — 25500020 PHARM REV CODE 255: Performed by: STUDENT IN AN ORGANIZED HEALTH CARE EDUCATION/TRAINING PROGRAM

## 2024-07-05 PROCEDURE — 84484 ASSAY OF TROPONIN QUANT: CPT | Performed by: STUDENT IN AN ORGANIZED HEALTH CARE EDUCATION/TRAINING PROGRAM

## 2024-07-05 PROCEDURE — 99900035 HC TECH TIME PER 15 MIN (STAT)

## 2024-07-05 RX ORDER — SODIUM CHLORIDE 0.9 % (FLUSH) 0.9 %
10 SYRINGE (ML) INJECTION
Status: DISCONTINUED | OUTPATIENT
Start: 2024-07-05 | End: 2024-07-08 | Stop reason: HOSPADM

## 2024-07-05 RX ORDER — SODIUM,POTASSIUM PHOSPHATES 280-250MG
2 POWDER IN PACKET (EA) ORAL
Status: DISCONTINUED | OUTPATIENT
Start: 2024-07-05 | End: 2024-07-08 | Stop reason: HOSPADM

## 2024-07-05 RX ORDER — TALC
9 POWDER (GRAM) TOPICAL NIGHTLY PRN
Status: DISCONTINUED | OUTPATIENT
Start: 2024-07-05 | End: 2024-07-08 | Stop reason: HOSPADM

## 2024-07-05 RX ORDER — NAPROXEN SODIUM 220 MG/1
81 TABLET, FILM COATED ORAL DAILY
Status: DISCONTINUED | OUTPATIENT
Start: 2024-07-06 | End: 2024-07-08 | Stop reason: HOSPADM

## 2024-07-05 RX ORDER — ACETAMINOPHEN 325 MG/1
650 TABLET ORAL EVERY 4 HOURS PRN
Status: DISCONTINUED | OUTPATIENT
Start: 2024-07-05 | End: 2024-07-08 | Stop reason: HOSPADM

## 2024-07-05 RX ORDER — CLOPIDOGREL BISULFATE 75 MG/1
75 TABLET ORAL DAILY
COMMUNITY

## 2024-07-05 RX ORDER — CLOPIDOGREL BISULFATE 75 MG/1
75 TABLET ORAL DAILY
Status: DISCONTINUED | OUTPATIENT
Start: 2024-07-06 | End: 2024-07-08 | Stop reason: HOSPADM

## 2024-07-05 RX ORDER — HEPARIN SODIUM,PORCINE/D5W 25000/250
0-40 INTRAVENOUS SOLUTION INTRAVENOUS CONTINUOUS
Status: DISCONTINUED | OUTPATIENT
Start: 2024-07-05 | End: 2024-07-08

## 2024-07-05 RX ORDER — EZETIMIBE 10 MG/1
10 TABLET ORAL DAILY
Status: DISCONTINUED | OUTPATIENT
Start: 2024-07-06 | End: 2024-07-08 | Stop reason: HOSPADM

## 2024-07-05 RX ORDER — ATORVASTATIN CALCIUM 40 MG/1
40 TABLET, FILM COATED ORAL NIGHTLY
Status: DISCONTINUED | OUTPATIENT
Start: 2024-07-05 | End: 2024-07-08 | Stop reason: HOSPADM

## 2024-07-05 RX ORDER — RANOLAZINE 500 MG/1
500 TABLET, EXTENDED RELEASE ORAL 2 TIMES DAILY
Status: DISCONTINUED | OUTPATIENT
Start: 2024-07-05 | End: 2024-07-08 | Stop reason: HOSPADM

## 2024-07-05 RX ORDER — AMOXICILLIN 250 MG
1 CAPSULE ORAL 2 TIMES DAILY PRN
Status: DISCONTINUED | OUTPATIENT
Start: 2024-07-05 | End: 2024-07-08 | Stop reason: HOSPADM

## 2024-07-05 RX ORDER — MELOXICAM 7.5 MG/1
7.5 TABLET ORAL DAILY
Status: DISCONTINUED | OUTPATIENT
Start: 2024-07-06 | End: 2024-07-05

## 2024-07-05 RX ORDER — SODIUM CHLORIDE 9 MG/ML
INJECTION, SOLUTION INTRAVENOUS CONTINUOUS
Status: ACTIVE | OUTPATIENT
Start: 2024-07-05 | End: 2024-07-06

## 2024-07-05 RX ORDER — LANOLIN ALCOHOL/MO/W.PET/CERES
800 CREAM (GRAM) TOPICAL
Status: DISCONTINUED | OUTPATIENT
Start: 2024-07-05 | End: 2024-07-08 | Stop reason: HOSPADM

## 2024-07-05 RX ORDER — NITROGLYCERIN 0.4 MG/1
0.4 TABLET SUBLINGUAL EVERY 5 MIN PRN
Status: DISCONTINUED | OUTPATIENT
Start: 2024-07-05 | End: 2024-07-08 | Stop reason: HOSPADM

## 2024-07-05 RX ORDER — SODIUM CHLORIDE 0.9 % (FLUSH) 0.9 %
3 SYRINGE (ML) INJECTION EVERY 12 HOURS PRN
Status: DISCONTINUED | OUTPATIENT
Start: 2024-07-05 | End: 2024-07-08 | Stop reason: HOSPADM

## 2024-07-05 RX ORDER — ONDANSETRON HYDROCHLORIDE 2 MG/ML
4 INJECTION, SOLUTION INTRAVENOUS EVERY 6 HOURS PRN
Status: DISCONTINUED | OUTPATIENT
Start: 2024-07-05 | End: 2024-07-08 | Stop reason: HOSPADM

## 2024-07-05 RX ORDER — LOSARTAN POTASSIUM 50 MG/1
100 TABLET ORAL DAILY
Status: DISCONTINUED | OUTPATIENT
Start: 2024-07-06 | End: 2024-07-08 | Stop reason: HOSPADM

## 2024-07-05 RX ORDER — AMLODIPINE BESYLATE 5 MG/1
5 TABLET ORAL DAILY
Status: DISCONTINUED | OUTPATIENT
Start: 2024-07-06 | End: 2024-07-06

## 2024-07-05 RX ORDER — NALOXONE HCL 0.4 MG/ML
0.02 VIAL (ML) INJECTION
Status: DISCONTINUED | OUTPATIENT
Start: 2024-07-05 | End: 2024-07-08 | Stop reason: HOSPADM

## 2024-07-05 RX ADMIN — ATORVASTATIN CALCIUM 40 MG: 40 TABLET, FILM COATED ORAL at 08:07

## 2024-07-05 RX ADMIN — HEPARIN SODIUM 12 UNITS/KG/HR: 10000 INJECTION, SOLUTION INTRAVENOUS at 03:07

## 2024-07-05 RX ADMIN — RANOLAZINE 500 MG: 500 TABLET, FILM COATED, EXTENDED RELEASE ORAL at 08:07

## 2024-07-05 RX ADMIN — Medication 9 MG: at 08:07

## 2024-07-05 RX ADMIN — IOHEXOL 100 ML: 350 INJECTION, SOLUTION INTRAVENOUS at 10:07

## 2024-07-05 RX ADMIN — HEPARIN SODIUM 14 UNITS/KG/HR: 10000 INJECTION, SOLUTION INTRAVENOUS at 11:07

## 2024-07-05 NOTE — ASSESSMENT & PLAN NOTE
Heart score 6  Hx of Cad, stents, cabg, htn, MI, bilateral carotid stenosis, ckd, increased cholesterol, obesity  Patient with back discomfort with associated nausea and diaphoresis, occasional shortness of breath. Relieved with aspirin and nitro  acute, trend troponin, ASA, telemetry,   Carotid US ordered  Patient is scheduled for PET scan stress test on 7/19/24 - Dr. Gutierres   Reports took aspirin and plavix today  Continue ranexa    Chart review:  Echocardiograms:   TTE 11/8/2022  The left ventricle is normal in size with concentric remodeling and normal systolic function.  The estimated ejection fraction is 65%.  Normal left ventricular diastolic function.  The ascending aorta is mildly dilated (4.16 cm)  Normal right ventricular size with normal right ventricular systolic function.  Mild left atrial enlargement.  Normal central venous pressure (3 mmHg).  The estimated PA systolic pressure is 19 mmHg.  IVC clearly collapsing, suspect non-cardiac cause of peripheral edema     TTE 2/11/2022  The left ventricle is normal in size with moderate concentric hypertrophy and normal systolic function.  The estimated ejection fraction is 55%.  Normal left ventricular diastolic function.  Normal right ventricular size with normal right ventricular systolic function.  Normal central venous pressure (3 mmHg).  The estimated PA systolic pressure is 18 mmHg.    TTE 6/5/2020  Normal left ventricular systolic function. The estimated ejection fraction is 55%.  Normal LV diastolic function.  No wall motion abnormalities.  Normal right ventricular systolic function.  Mild right ventricular enlargement.  Mild left atrial enlargement.  Intermediate central venous pressure (8 mmHg).  The aortic root is mildly dilated.  The study quality was technically difficult. The study was difficult due to patient's body habitus.    Stress Tests:   PET Stress Test 6/22/2020    There is a very small sized, mild intensity mid inferior reversible  perfusion abnormality in the distribution of thePDA indicative of focal coronary stenosis.    Within the perfusion abnormality absolute myocardial perfusion (cc/min/gm) averaged 0.60 cc/min/g at rest, 0.88 cc/min/g at stress and CFR was 1.46 cc/min/g, which equates to mildly reduced coronary flow capacity (Green) in 6% of the myocardium (within the PDA territory) .    Whole heart absolute myocardial perfusion (cc/min/g) averaged 0.75 cc/min/g at rest, 1.40 cc/min/g at stress, and 1.88  CFR.    Gated perfusion images showed an ejection fraction of 74% at rest and 75% during stress. Normal ejection fraction is greater than 51%.    Wall motion was normal at rest and during stress.    LV cavity size is normal at rest and stress.    The EKG portion of this study is abnormal but not diagnostic.    There were no arrhythmias during stress.    The patient reported no chest pain during the stress test.    When compared to the prior study on 5/14/2018, absolute stress flow and coronary flow capacity has improved in all territories.  These findings are most consistent with either physiologic improvement of CAD or likely caffeine effect on the prior study.  Overall, the perfusion patterns are visually very similar.     PET Stress test 5/14/2018  CONCLUSIONS: ABNORMAL MYOCARDIAL PERFUSION PET STRESS TEST   1. There is a very small sized mild ischemia in the mid inferoseptal wall in the usual distribution of the Posterior Descending Artery. This defect comprises 5 % of the left ventricular myocardium.   2. There is a very small sized mild ischemia in the basilar lateral wall in the usual distribution of the Left Circumflex Artery. This defect comprises 5 % of the left ventricular myocardium.   3. Resting wall motion is physiologic. Stress wall motion is physiologic.   4. LV function is normal at rest and stress.  (normal is >= 51%)   5. The ventricular volumes are normal at rest and stress.   6. The extracardiac distribution of  radioactivity is normal.   7. When compared to the previous study from 12/08/2015, the lateral wall has significantly improved.  There is also a very small region of ischemia in the PDA territory.      Caths:   [facility] 12/9/2015  1. LVEDP=15mmHg   2. LVEF=65%   3. Patent LIMA to LAD   4. Patent SVG to D1   5. Occluded SVG to OM1   6. Tandem 95% and 75% stenoses of SVG to PDA   7. 95% stenosis of distal LM   8. Small caliber diffusely diseased LCx and OM1   9. Successful PCI of SVG to PDA with GREGORIA   10. Successful PCI of distal LM with GREGORIA      Other:  Event monitor 1/11/2018  In summary this patient's monitor is consistent with sinus rhythm and a single PVC. There was no atrial fibrillation while the device was in use.      Carotid ultrasound 1/10/2018  There is 20 - 39% right Internal Carotid stenosis.   There is 40 - 49% left Internal Carotid stenosis.

## 2024-07-05 NOTE — Clinical Note
The catheter was inserted over the wire into the aorta. An angiography was performed Multiple views were taken. The angiography was performed via hand injection with .  The catheter was unable to engage the area..

## 2024-07-05 NOTE — PHARMACY MED REC
"              .        Admission Medication History     The home medication history was taken by Autumn Arias.    You may go to "Admission" then "Reconcile Home Medications" tabs to review and/or act upon these items.     The home medication list has been updated by the Pharmacy department.   Please read ALL comments highlighted in yellow.   Please address this information as you see fit.    Feel free to contact us if you have any questions or require assistance.      Medications listed below were obtained from: Patient/family and Analytic software- [employer]  No current facility-administered medications on file prior to encounter.     Current Outpatient Medications on File Prior to Encounter   Medication Sig Dispense Refill    amLODIPine (NORVASC) 5 MG tablet Take 1 tablet (5 mg total) by mouth once daily. 90 tablet 3    aspirin (ASPIR-81 ORAL) Take 81 mg by mouth once daily.      coenzyme Q10 10 mg capsule Take 200 mg by mouth once daily.      ezetimibe (ZETIA) 10 mg tablet Take 1 tablet (10 mg total) by mouth once daily. 90 tablet 3    fish oil-omega-3 fatty acids 300-1,000 mg capsule Take 1,200 g by mouth once daily.      levothyroxine (SYNTHROID) 125 MCG tablet Take 125 mcg by mouth once daily.   3    losartan (COZAAR) 100 MG tablet TAKE 1 TABLET(100 MG) BY MOUTH EVERY DAY (Patient taking differently: Take 100 mg by mouth once daily.) 90 tablet 3    ranolazine (RANEXA) 500 MG Tb12 Take 1 tablet (500 mg total) by mouth 2 (two) times daily. 180 tablet 3    rosuvastatin (CRESTOR) 20 MG tablet Take 1 tablet (20 mg total) by mouth once daily. 90 tablet 3    clopidogreL (PLAVIX) 75 mg tablet Take 75 mg by mouth once daily. (Patient not taking: Reported on 7/5/2024)      etodolac (LODINE) 400 MG tablet Take 400 mg by mouth 2 (two) times daily. (Patient not taking: Reported on 7/5/2024)      testosterone cypionate 200 mg/mL Kit Inject 200 mg into the muscle every 14 (fourteen) days. (Patient not taking: " Reported on 7/5/2024)         Potential issues to be addressed PRIOR TO DISCHARGE  Patient reported not taking the following medications: (Plavix, Testesterone & Etodolac). These medications remain on the home medication list. Please address accordingly.     Autumn Arias  EXT 1924

## 2024-07-05 NOTE — H&P
"  [facility] - Emergency Dept  Hospital Medicine  History & Physical    Patient Name: Preston Arzate  MRN: [id number]  Patient Class: OP- Observation  Admission Date: 7/5/2024  Attending Physician: Cherise Fields MD   Primary Care Provider: Matthew Brown IV, MD         Patient information was obtained from patient and ER records.     Subjective:     Principal Problem:Unstable angina    Chief Complaint:   Chief Complaint   Patient presents with    Back Pain     UPPER, BTWN SHOULDER BLADES, " I THINK I HAD A HEART EVENT"    GEN WEAKNESS     SINCE TAKING NITRO        HPI: 69-year-old male with a past medical history of coronary artery disease status post stent placement LM, SVG to PDA 2015on Plavix and aspirin, status post CABG x 2 2005 (LIMA to LAD, SVG to PDA), Enlarged ascending aorta (4.4 cm), hypertension, hyperlipidemia, bilateral carotid stenosis (20-39% CHARLENE, 30-49% LICA), sleep apnea compliant with CPAP at night, CKD stage 3 presents to the emergency room with reports of mild back discomfort with associated nausea and diaphoresis also reports having some shortness for breath on and off for the last couple of weeks.  Patient denies chest pain.  Patient reports back pain improved with aspirin and nitro today.  Patient reports symptoms are worse after he eats and while at rest.  In the ER, troponin I high sensitivity 37.5, BNP 14, CBC CMP unremarkable, EKG with nonspecific ST and T-wave abnormality chest x-ray nonacute, CTA chest with no evidence of pulmonary embolus, Subsegmental atelectasis of the bilateral lungs.  Admit to hospital medicine due to mildly elevated troponin history of aortic aneurysm, moderate heart risk with heart score 6. Trend serial troponin.       Past Medical History:   Diagnosis Date    Bilateral carotid artery stenosis 12/15/2021    Bilateral carotid artery stenosis 12/15/2021    Coronary artery disease     Hypertension     Mixed hyperlipidemia 12/15/2021    " Pure hypercholesterolemia 10/1/2018    Stroke 01/2018    amarousus fugax left    Thyroid disease        Past Surgical History:   Procedure Laterality Date    CARDIAC CATHETERIZATION  03/2015    x 2    CORONARY ANGIOPLASTY  12/15    GREGORIA X 2 left main and SVG to PDA    CORONARY ARTERY BYPASS GRAFT  07/2005    x 4    TOTAL THYROIDECTOMY         Review of patient's allergies indicates:   Allergen Reactions    Carvedilol      Pedal edema    Imdur [isosorbide mononitrate]      headaches       No current facility-administered medications on file prior to encounter.     Current Outpatient Medications on File Prior to Encounter   Medication Sig    amLODIPine (NORVASC) 5 MG tablet Take 1 tablet (5 mg total) by mouth once daily.    aspirin (ASPIR-81 ORAL) Take 81 mg by mouth once daily.    coenzyme Q10 10 mg capsule Take 200 mg by mouth once daily.    ezetimibe (ZETIA) 10 mg tablet Take 1 tablet (10 mg total) by mouth once daily.    fish oil-omega-3 fatty acids 300-1,000 mg capsule Take 1,200 g by mouth once daily.    levothyroxine (SYNTHROID) 125 MCG tablet Take 125 mcg by mouth once daily.     losartan (COZAAR) 100 MG tablet TAKE 1 TABLET(100 MG) BY MOUTH EVERY DAY (Patient taking differently: Take 100 mg by mouth once daily.)    ranolazine (RANEXA) 500 MG Tb12 Take 1 tablet (500 mg total) by mouth 2 (two) times daily.    rosuvastatin (CRESTOR) 20 MG tablet Take 1 tablet (20 mg total) by mouth once daily.    clopidogreL (PLAVIX) 75 mg tablet Take 75 mg by mouth once daily. (Patient not taking: Reported on 7/5/2024)    etodolac (LODINE) 400 MG tablet Take 400 mg by mouth 2 (two) times daily. (Patient not taking: Reported on 7/5/2024)    testosterone cypionate 200 mg/mL Kit Inject 200 mg into the muscle every 14 (fourteen) days. (Patient not taking: Reported on 7/5/2024)     Family History       Problem Relation (Age of Onset)    Heart attack Father, Paternal Grandfather    Heart disease Sister    Hypertension Mother, Sister           Tobacco Use    Smoking status: Never    Smokeless tobacco: Never   Substance and Sexual Activity    Alcohol use: No    Drug use: Not on file    Sexual activity: Not on file     Review of Systems   Constitutional:  Positive for diaphoresis.   Respiratory:  Positive for shortness of breath. Negative for cough, chest tightness and wheezing.    Cardiovascular:  Negative for chest pain and palpitations.   Gastrointestinal:  Positive for nausea. Negative for constipation, diarrhea and vomiting.   Genitourinary: Negative.      Objective:     Vital Signs (Most Recent):  Temp: 98 °F (36.7 °C) (07/05/24 0804)  Pulse: 72 (07/05/24 0804)  Resp: 18 (07/05/24 0804)  BP: 133/80 (07/05/24 0804)  SpO2: 95 % (07/05/24 0804) Vital Signs (24h Range):  Temp:  [98 °F (36.7 °C)] 98 °F (36.7 °C)  Pulse:  [72] 72  Resp:  [18] 18  SpO2:  [95 %] 95 %  BP: (133)/(80) 133/80     Weight: 104.3 kg (230 lb)  Body mass index is 33 kg/m².     Physical Exam  Vitals reviewed.   Constitutional:       General: He is not in acute distress.     Appearance: Normal appearance. He is obese. He is not ill-appearing.   HENT:      Head: Normocephalic and atraumatic.      Mouth/Throat:      Mouth: Mucous membranes are moist.   Eyes:      Extraocular Movements: Extraocular movements intact.      Pupils: Pupils are equal, round, and reactive to light.   Cardiovascular:      Rate and Rhythm: Normal rate and regular rhythm.      Heart sounds: No murmur heard.  Pulmonary:      Effort: Pulmonary effort is normal.   Abdominal:      General: Bowel sounds are normal.      Palpations: Abdomen is soft.      Tenderness: There is no abdominal tenderness.   Musculoskeletal:         General: No swelling. Normal range of motion.      Cervical back: Neck supple.   Skin:     General: Skin is warm and dry.   Neurological:      General: No focal deficit present.      Mental Status: He is alert. Mental status is at baseline.   Psychiatric:         Mood and Affect: Mood  normal.              CRANIAL NERVES     CN III, IV, VI   Pupils are equal, round, and reactive to light.       Significant Labs: All pertinent labs within the past 24 hours have been reviewed.  Recent Lab Results         07/05/24  0857        Albumin 4.0       ALP 66       ALT 40       Anion Gap 7       AST 24       Baso # 0.08       Basophil % 1.1       BILIRUBIN TOTAL 0.4  Comment: For infants and newborns, interpretation of results should be based  on gestational age, weight and in agreement with clinical  observations.    Premature Infant recommended reference ranges:  Up to 24 hours.............<8.0 mg/dL  Up to 48 hours............<12.0 mg/dL  3-5 days..................<15.0 mg/dL  6-29 days.................<15.0 mg/dL         BNP 14  Comment: Values of less than 100 pg/ml are consistent with non-CHF populations.       BUN 17       Calcium 9.0       Chloride 109       CO2 25       Creatinine 1.3       Differential Method Automated       eGFR 59.5       Eos # 0.2       Eos % 2.3       Glucose 105       Gran # (ANC) 4.7       Gran % 62.6       Hematocrit 44.1       Hemoglobin 14.0       Immature Grans (Abs) 0.03  Comment: Mild elevation in immature granulocytes is non specific and   can be seen in a variety of conditions including stress response,   acute inflammation, trauma and pregnancy. Correlation with other   laboratory and clinical findings is essential.         Immature Granulocytes 0.4       Lymph # 1.8       Lymph % 23.6       Magnesium  2.0       MCH 29.5       MCHC 31.7       MCV 93       Mono # 0.8       Mono % 10.0       MPV 10.2       nRBC 0       Platelet Count 254       Potassium 4.3       PROTEIN TOTAL 7.4       RBC 4.75       RDW 15.0       Sodium 141       Troponin I High Sensitivity 37.5  Comment: Troponin results differ between methods. Do not use   results between Troponin methods interchangeably.    Alkaline Phospatase levels above 400 U/L may   cause false positive results.    Access  hsTnI should not be used for patients taking   Asfotase mauro (Strensiq).         WBC 7.49               Significant Imaging: I have reviewed all pertinent imaging results/findings within the past 24 hours.  Imaging Results              CTA Chest Aorta Non Coronary (Final result)  Result time 07/05/24 11:20:08      Final result by Ernie Rubin DO (07/05/24 11:20:08)                   Impression:      1. No pulmonary embolism identified.  2. Subsegmental atelectasis of the bilateral lungs as described.      Electronically signed by: Ernie Rubin  Date:    07/05/2024  Time:    11:20               Narrative:      CMS MANDATED QUALITY DATA - CT RADIATION - 436    All CT scans at this facility utilize dose modulation, iterative reconstruction, and/or weight based dosing when appropriate to reduce radiation dose to as low as reasonably achievable.    EXAMINATION:  CTA CHEST AORTA NON CORONARY    CLINICAL HISTORY:  Aortic dissection suspected;    TECHNIQUE:  CT angiography of the chest with 100 mL Omnipaque 350. Maximum intensity projection coronal reformations were created at a separate workstation and stored in the patient's permanent medical record.    COMPARISON:  None    FINDINGS:  No pulmonary embolism identified.  The thoracic aorta is normal caliber with mild calcified plaque formation.  Heart size is normal.  No enlarged mediastinal lymph nodes or mass.    The central tracheobronchial tree is patent.  There is bilateral dependent subsegmental atelectasis of the lungs.  There are geographic ground-glass opacities of the bilateral lungs, likely secondary to low inspiratory effort.    The visualized abdominal viscera are unremarkable.  There are degenerative changes of the spine with no acute osseous abnormality observed.  Median sternotomy wires noted.                                       X-Ray Chest AP Portable (Final result)  Result time 07/05/24 08:28:00      Final result by Jose E Cantu MD (07/05/24  08:28:00)                   Impression:      No evidence of active cardiopulmonary disease.      Electronically signed by: Jose E Cantu  Date:    07/05/2024  Time:    08:28               Narrative:    EXAMINATION:  XR CHEST AP PORTABLE    CLINICAL HISTORY:  Chest Pain; and weakness    FINDINGS:  Portable chest radiograph at 08:14 hours compared to prior exams shows median sternotomy wires and mediastinal surgical clips, with the cardiomediastinal silhouette and pulmonary vasculature within normal limits.    The lungs are normally expanded, with no consolidation, large pleural effusion, or evidence of pulmonary edema. No pneumothorax. There are no significant osseous abnormalities.                                     Assessment/Plan:     * Unstable angina  Heart score 6  Hx of Cad, stents, cabg, htn, MI, bilateral carotid stenosis, ckd, increased cholesterol, obesity  Patient with back discomfort with associated nausea and diaphoresis, occasional shortness of breath. Relieved with aspirin and nitro  acute, trend troponin, ASA, telemetry,   Carotid US ordered  Patient is scheduled for PET scan stress test on 7/19/24 - Dr. Gutierres  (Cardiologist)  Reports took aspirin and plavix today  Continue ranexa  Troponin I high senstivity 37.5, repeat Troponin I high sensitivity 267  Consult Cardiology. (I personally discussed patient and case with Madiha Lucas NP)  Heparin drip started    Chart review:  Echocardiograms:   TTE 11/8/2022  The left ventricle is normal in size with concentric remodeling and normal systolic function.  The estimated ejection fraction is 65%.  Normal left ventricular diastolic function.  The ascending aorta is mildly dilated (4.16 cm)  Normal right ventricular size with normal right ventricular systolic function.  Mild left atrial enlargement.  Normal central venous pressure (3 mmHg).  The estimated PA systolic pressure is 19 mmHg.  IVC clearly collapsing, suspect non-cardiac cause of peripheral  edema     TTE 2/11/2022  The left ventricle is normal in size with moderate concentric hypertrophy and normal systolic function.  The estimated ejection fraction is 55%.  Normal left ventricular diastolic function.  Normal right ventricular size with normal right ventricular systolic function.  Normal central venous pressure (3 mmHg).  The estimated PA systolic pressure is 18 mmHg.    TTE 6/5/2020  Normal left ventricular systolic function. The estimated ejection fraction is 55%.  Normal LV diastolic function.  No wall motion abnormalities.  Normal right ventricular systolic function.  Mild right ventricular enlargement.  Mild left atrial enlargement.  Intermediate central venous pressure (8 mmHg).  The aortic root is mildly dilated.  The study quality was technically difficult. The study was difficult due to patient's body habitus.    Stress Tests:   PET Stress Test 6/22/2020    There is a very small sized, mild intensity mid inferior reversible perfusion abnormality in the distribution of thePDA indicative of focal coronary stenosis.    Within the perfusion abnormality absolute myocardial perfusion (cc/min/gm) averaged 0.60 cc/min/g at rest, 0.88 cc/min/g at stress and CFR was 1.46 cc/min/g, which equates to mildly reduced coronary flow capacity (Green) in 6% of the myocardium (within the PDA territory) .    Whole heart absolute myocardial perfusion (cc/min/g) averaged 0.75 cc/min/g at rest, 1.40 cc/min/g at stress, and 1.88  CFR.    Gated perfusion images showed an ejection fraction of 74% at rest and 75% during stress. Normal ejection fraction is greater than 51%.    Wall motion was normal at rest and during stress.    LV cavity size is normal at rest and stress.    The EKG portion of this study is abnormal but not diagnostic.    There were no arrhythmias during stress.    The patient reported no chest pain during the stress test.    When compared to the prior study on 5/14/2018, absolute stress flow and  coronary flow capacity has improved in all territories.  These findings are most consistent with either physiologic improvement of CAD or likely caffeine effect on the prior study.  Overall, the perfusion patterns are visually very similar.     PET Stress test 5/14/2018  CONCLUSIONS: ABNORMAL MYOCARDIAL PERFUSION PET STRESS TEST   1. There is a very small sized mild ischemia in the mid inferoseptal wall in the usual distribution of the Posterior Descending Artery. This defect comprises 5 % of the left ventricular myocardium.   2. There is a very small sized mild ischemia in the basilar lateral wall in the usual distribution of the Left Circumflex Artery. This defect comprises 5 % of the left ventricular myocardium.   3. Resting wall motion is physiologic. Stress wall motion is physiologic.   4. LV function is normal at rest and stress.  (normal is >= 51%)   5. The ventricular volumes are normal at rest and stress.   6. The extracardiac distribution of radioactivity is normal.   7. When compared to the previous study from 12/08/2015, the lateral wall has significantly improved.  There is also a very small region of ischemia in the PDA territory.      Caths:   [facility] 12/9/2015  1. LVEDP=15mmHg   2. LVEF=65%   3. Patent LIMA to LAD   4. Patent SVG to D1   5. Occluded SVG to OM1   6. Tandem 95% and 75% stenoses of SVG to PDA   7. 95% stenosis of distal LM   8. Small caliber diffusely diseased LCx and OM1   9. Successful PCI of SVG to PDA with GREGORIA   10. Successful PCI of distal LM with GREGORIA      Other:  Event monitor 1/11/2018  In summary this patient's monitor is consistent with sinus rhythm and a single PVC. There was no atrial fibrillation while the device was in use.        Back pain  Relieved with nitro and aspirin  chest x-ray nonacute, CTA chest with no evidence of pulmonary embolus, Subsegmental atelectasis of the bilateral lungs.       BMI 33.0-33.9,adult  Body mass index is 33 kg/m². Morbid obesity complicates all  aspects of disease management from diagnostic modalities to treatment. Weight loss encouraged and health benefits explained to patient.       Bilateral carotid artery stenosis  Carotid ultrasound:  Right internal carotid artery estimated peak systolic velocity is 128 cm/sec.   Antegrade flow in right vertebral artery.  Left internal carotid artery estimated peak systolic velocity is 333 cm/sec.   Antegrade flow in left vertebral artery.   Greater than 70% left ICA stenosis.  50-69% right ICA stenosis.  Continue aspirin Plavix  Consult vascular surgery    Carotid ultrasound 1/10/2018  There is 20 - 39% right Internal Carotid stenosis.   There is 40 - 49% left Internal Carotid stenosis.       Essential hypertension  Chronic, controlled. Latest blood pressure and vitals reviewed-     Temp:  [98 °F (36.7 °C)]   Pulse:  [56-74]   Resp:  [13-19]   BP: (120-133)/(68-80)   SpO2:  [94 %-97 %] .   Home meds for hypertension were reviewed and noted below.   Hypertension Medications               amLODIPine (NORVASC) 5 MG tablet Take 1 tablet (5 mg total) by mouth once daily.    losartan (COZAAR) 100 MG tablet TAKE 1 TABLET(100 MG) BY MOUTH EVERY DAY            While in the hospital, will manage blood pressure as follows; Continue home antihypertensive regimen    Will utilize p.r.n. blood pressure medication only if patient's blood pressure greater than 180/110 and he develops symptoms such as worsening chest pain or shortness of breath.      VTE Risk Mitigation (From admission, onward)           Ordered     Place sequential compression device  Until discontinued         07/05/24 1252     IP VTE HIGH RISK PATIENT  Once         07/05/24 1232                         On 07/05/2024, patient should be placed in hospital observation services under my care in collaboration with Dr. Fields.    Cardiology consulted       Regina Hanna NP  Department of Hospital Medicine  [facility] - Emergency Dept

## 2024-07-05 NOTE — Clinical Note
The catheter was repositioned into the ostial SVG graft. An angiography was performed of the graft. Multiple views were taken. The angiography was performed via hand injection with 10 mL of contrast.  The catheter was unable to engage the area..

## 2024-07-05 NOTE — Clinical Note
The catheter was inserted over the wire into the ostial LIMA graft. An angiography was performed of the graft. The angiography was performed via hand injection with 10 mL of contrast.  The catheter was unable to engage the area..Contrast estimated

## 2024-07-05 NOTE — PLAN OF CARE
[facility] - Emergency Dept  Initial Discharge Assessment       Primary Care Provider: Matthew Brown IV, MD    Admission Diagnosis: Unstable angina [I20.0]    Admission Date: 7/5/2024  Expected Discharge Date:      met with Pt at bedside to complete discharge assessment. Pt AAOx4s. Demographics, PCP, and insurance verified. No home health. No dialysis. Pt reports ability to complete ADLs without assistance. Pt verbalized plan to discharge home via family transport. Pt has no other needs to be addressed at this time.     Transition of Care Barriers: None    Pay[place]: [employer] [place] / Plan: [employer] [place] / Product Type: Commercial /     Extended Emergency Contact Information  Primary Emergency Contact: Leydi Robbins   [place]  Mobile Phone: [phone]  Relation: Friend  Secondary Emergency Contact: Karthikeyan Arzate  Mobile Phone: [phone]  Relation: Son  Preferred language: English   needed? No    Discharge Plan A: Home with family  Discharge Plan B: Home      [id number] - [place], [place] - [address] AT [facility] [place] [place] [address]  [address]  [place] [address]  Phone: [phone] Fax: [phone]      Initial Assessment (most recent)       Adult Discharge Assessment - 07/05/24 1424          Discharge Assessment    Assessment Type Discharge Planning Assessment     Confirmed/corrected address, phone number and insurance Yes     Confirmed Demographics Correct on Facesheet     Source of Information patient     If unable to respond/provide information was family/caregiver contacted? Yes     When was your last doctors appointment? --   Pt reports last seeing PCP 4 months ago.    Communicated IFEOMA with patient/caregiver Date not available/Unable to determine     Reason For Admission Unstable angina     People in Home child(evan), adult     Facility Arrived From: Home     Do you expect to return to your current living  situation? Yes     Do you have help at home or someone to help you manage your care at home? Yes     Who are your caregiver(s) and their phone number(s)? Son: Karthikeyan Arzate     Prior to hospitilization cognitive status: Alert/Oriented     Current cognitive status: Alert/Oriented     Walking or Climbing Stairs Difficulty no     Dressing/Bathing Difficulty no     Home Accessibility not wheelchair accessible     Home Layout Able to live on 1st floor     Equipment Currently Used at Home CPAP     Readmission within 30 days? No     Patient currently being followed by outpatient case management? No     Do you currently have service(s) that help you manage your care at home? No     Do you take prescription medications? Yes     Do you have prescription coverage? Yes     Coverage Payor: [employer] [facility] -     Do you have any problems affording any of your prescribed medications? No     Is the patient taking medications as prescribed? yes     Who is going to help you get home at discharge? Self Transportation     How do you get to doctors appointments? car, drives self     Are you on dialysis? No     Do you take coumadin? No     Discharge Plan A Home with family     Discharge Plan B Home     DME Needed Upon Discharge  none     Discharge Plan discussed with: Patient     Transition of Care Barriers None

## 2024-07-05 NOTE — ASSESSMENT & PLAN NOTE
Relieved with nitro and aspirin  chest x-ray nonacute, CTA chest with no evidence of pulmonary embolus, Subsegmental atelectasis of the bilateral lungs.

## 2024-07-05 NOTE — NURSING
Nurses Note -- 4 Eyes      7/5/2024   3:12 PM      Skin assessed during: Admit      [x] No Altered Skin Integrity Present    []Prevention Measures Documented      [] Yes- Altered Skin Integrity Present or Discovered   [] LDA Added if Not in Epic (Describe Wound)   [] New Altered Skin Integrity was Present on Admit and Documented in LDA   [] Wound Image Taken    Wound Care Consulted? No    Attending Nurse:  Kim MICHELLE     Second RN/Staff Member:   Rj BENITO

## 2024-07-05 NOTE — ASSESSMENT & PLAN NOTE
Chronic, controlled. Latest blood pressure and vitals reviewed-     Temp:  [98 °F (36.7 °C)]   Pulse:  [56-74]   Resp:  [13-19]   BP: (120-133)/(68-80)   SpO2:  [94 %-97 %] .   Home meds for hypertension were reviewed and noted below.   Hypertension Medications               amLODIPine (NORVASC) 5 MG tablet Take 1 tablet (5 mg total) by mouth once daily.    losartan (COZAAR) 100 MG tablet TAKE 1 TABLET(100 MG) BY MOUTH EVERY DAY            While in the hospital, will manage blood pressure as follows; Continue home antihypertensive regimen    Will utilize p.r.n. blood pressure medication only if patient's blood pressure greater than 180/110 and he develops symptoms such as worsening chest pain or shortness of breath.

## 2024-07-05 NOTE — CONSULTS
[facility]  Vascular Surgery  Consult Note    Inpatient consult to Vascular Surgery  Consult performed by: Smita Burch MD  Consult ordered by: Regina Hanna NP        Subjective:       History of Present Illness: 69-year-old male with a past medical history of coronary artery disease s/p CABG and multiple stents, hypertension, hyperlipidemia,sleep apnea compliant with CPAP at night, CKD, hypothyroidism presented to the [facility] ED on 7/5/24 with reports of mild back discomfort with associated nausea and diaphoresis also reports having some shortness for breath on and off for the last couple of weeks. Patient reports back pain improved with aspirin and nitro. He was told he had a cardiac event. Cardiology consulted and planning for cardiac cath.  Patient had a carotid US performed showing significant stenosis of the left ICA. Patient has no history of stroke, TIA, unilateral weakness, slurred speech, facial drooping or amaurosis fugax. He had prior thyroid surgery, but no other surgery on the neck. No radiation history. He is independent and highly functional. 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 He is a nonsmoker. He reported he could not tolerate plavix because of muscle cramps - suspect he may have meant the cholesterol medication. Relevant if carotid stenting is an option.     Medications Prior to Admission   Medication Sig Dispense Refill Last Dose    amLODIPine (NORVASC) 5 MG tablet Take 1 tablet (5 mg total) by mouth once daily. 90 tablet 3 7/4/2024    aspirin (ASPIR-81 ORAL) Take 81 mg by mouth once daily.   7/4/2024    coenzyme Q10 10 mg capsule Take 200 mg by mouth once daily.   7/5/2024    ezetimibe (ZETIA) 10 mg tablet Take 1 tablet (10 mg total) by mouth once daily. 90 tablet 3 7/4/2024    fish oil-omega-3 fatty acids 300-1,000 mg capsule Take 1,200 g by mouth once daily.   7/4/2024    levothyroxine (SYNTHROID) 125 MCG tablet Take 125 mcg by mouth  once daily.   3 7/5/2024    losartan (COZAAR) 100 MG tablet TAKE 1 TABLET(100 MG) BY MOUTH EVERY DAY (Patient taking differently: Take 100 mg by mouth once daily.) 90 tablet 3 7/5/2024    ranolazine (RANEXA) 500 MG Tb12 Take 1 tablet (500 mg total) by mouth 2 (two) times daily. 180 tablet 3 7/5/2024    rosuvastatin (CRESTOR) 20 MG tablet Take 1 tablet (20 mg total) by mouth once daily. 90 tablet 3 7/4/2024    clopidogreL (PLAVIX) 75 mg tablet Take 75 mg by mouth once daily. (Patient not taking: Reported on 7/5/2024)   Not Taking    etodolac (LODINE) 400 MG tablet Take 400 mg by mouth 2 (two) times daily. (Patient not taking: Reported on 7/5/2024)   Not Taking    testosterone cypionate 200 mg/mL Kit Inject 200 mg into the muscle every 14 (fourteen) days. (Patient not taking: Reported on 7/5/2024)   Not Taking       Review of patient's allergies indicates:   Allergen Reactions    Carvedilol      Pedal edema    Imdur [isosorbide mononitrate]      headaches       Past Medical History:   Diagnosis Date    Bilateral carotid artery stenosis 12/15/2021    Bilateral carotid artery stenosis 12/15/2021    Coronary artery disease     Hypertension     Mixed hyperlipidemia 12/15/2021    Pure hypercholesterolemia 10/1/2018    Stroke 01/2018    amarousus fugax left    Thyroid disease      Past Surgical History:   Procedure Laterality Date    CARDIAC CATHETERIZATION  03/2015    x 2    CORONARY ANGIOPLASTY  12/15    GREGORIA X 2 left main and SVG to PDA    CORONARY ARTERY BYPASS GRAFT  07/2005    x 4    TOTAL THYROIDECTOMY       Family History       Problem Relation (Age of Onset)    Heart attack Father, Paternal Grandfather    Heart disease Sister    Hypertension Mother, Sister          Tobacco Use    Smoking status: Never    Smokeless tobacco: Never   Substance and Sexual Activity    Alcohol use: No    Drug use: Not on file    Sexual activity: Not on file     Review of Systems  Objective:     Vital Signs (Most Recent):  Temp: 98 °F  (36.7 °C) (07/05/24 0804)  Pulse: 71 (07/05/24 1429)  Resp: 14 (07/05/24 1434)  BP: 132/71 (07/05/24 1429)  SpO2: 95 % (07/05/24 1434) Vital Signs (24h Range):  Temp:  [98 °F (36.7 °C)] 98 °F (36.7 °C)  Pulse:  [56-74] 71  Resp:  [13-19] 14  SpO2:  [94 %-97 %] 95 %  BP: (117-133)/(68-80) 132/71     Weight: 108.5 kg (239 lb 3.2 oz)  Body mass index is 34.32 kg/m².        Physical Exam  Constitutional:       Appearance: Normal appearance.   Cardiovascular:      Rate and Rhythm: Normal rate and regular rhythm.   Pulmonary:      Effort: Pulmonary effort is normal.      Breath sounds: Normal breath sounds.   Musculoskeletal:      Cervical back: Normal range of motion and neck supple.      Comments: Palpable bilateral radial pulses  Palpable PT pulses bilaterally   Skin:     General: Skin is warm and dry.   Neurological:      General: No focal deficit present.      Mental Status: He is alert and oriented to person, place, and time.      Cranial Nerves: No cranial nerve deficit.      Motor: No weakness.         Significant Diagnostics:  EXAMINATION:  US CAROTID BILATERAL     CLINICAL HISTORY:  GARTH;  personal history of coronary artery disease, unstable angina, chest pain     COMPARISON:  MRA neck 01/11/2018     FINDINGS:  Color Doppler, spectral Doppler, and grayscale analysis was performed.     Grayscale images show moderate atherosclerotic plaque in bilateral carotid bulbs extending into proximal internal carotid arteries bilaterally.  Mild atherosclerotic plaque is also occurs in bilateral external carotid arteries.     Right internal carotid artery estimated peak systolic velocity is 128 cm/sec.     Antegrade flow in right vertebral artery.     Left internal carotid artery estimated peak systolic velocity is 333 cm/sec.     Antegrade flow in left vertebral artery.     Impression:     1. Greater than 70% left ICA stenosis.  2. 50-69% right ICA stenosis.  This report was flagged in Epic as  abnormal.        Assessment/Plan:   69M admitted with cardiac event with left ICA stenosis, asymptomatic. Will follow along as patient completes cardiac work-up and procedures.  1. Please obtain CTA head and neck  2. Continue aspirin, statin and plavix    Active Diagnoses:    Diagnosis Date Noted POA    PRINCIPAL PROBLEM:  Unstable angina [I20.0] 07/05/2024 Yes    BMI 33.0-33.9,adult [Z68.33] 07/05/2024 Not Applicable    Back pain [M54.9] 07/05/2024 Yes    Bilateral carotid artery stenosis [I65.23] 12/15/2021 Yes    Essential hypertension [I10] 10/23/2015 Yes      Problems Resolved During this Admission:       Thank you for your consult. I will follow-up with patient. Please contact us if you have any additional questions.    Smita Burch MD  Vascular Surgery  [facility] [place]

## 2024-07-05 NOTE — ASSESSMENT & PLAN NOTE
Body mass index is 33 kg/m². Morbid obesity complicates all aspects of disease management from diagnostic modalities to treatment. Weight loss encouraged and health benefits explained to patient.

## 2024-07-05 NOTE — HPI
69-year-old male with a past medical history of coronary artery disease status post stent placement LM, SVG to PDA 2015on Plavix and aspirin, status post CABG x 2 2005 (LIMA to LAD, SVG to PDA), Enlarged ascending aorta (4.4 cm), hypertension, hyperlipidemia, bilateral carotid stenosis (20-39% CHARLENE, 30-49% LICA), sleep apnea compliant with CPAP at night, CKD stage 3 presents to the emergency room with reports of mild back discomfort with associated nausea and diaphoresis also reports having some shortness for breath on and off for the last couple of weeks.  Patient denies chest pain.  Patient reports back pain improved with aspirin and nitro today.  Patient reports symptoms are worse after he eats and while at rest.  In the ER, troponin I high sensitivity 37.5, BNP 14, CBC CMP unremarkable, EKG with nonspecific ST and T-wave abnormality chest x-ray nonacute, CTA chest with no evidence of pulmonary embolus, Subsegmental atelectasis of the bilateral lungs.  Admit to hospital medicine due to mildly elevated troponin history of aortic aneurysm, moderate heart risk with heart score 6. Trend serial troponin.

## 2024-07-05 NOTE — CONSULTS
[facility] - Emergency Dept  Department of Cardiology  Consult Note      PATIENT NAME: Preston Arzate    MRN: [id number]  TODAY'S DATE: 07/05/2024  ADMIT DATE: 7/5/2024                          CONSULT REQUESTED BY: Cherise Fields MD    SUBJECTIVE     PRINCIPAL PROBLEM: Unstable angina      REASON FOR CONSULT:  NSTEMI      HPI:  Mr. Arzate is a 69-year-old male with a past medical history of coronary artery disease status post stent placement LM, SVG to PDA 2015on Plavix and aspirin, status post CABG x 2 2005 (LIMA to LAD, SVG to PDA), Enlarged ascending aorta (4.4 cm), hypertension, hyperlipidemia, bilateral carotid stenosis (20-39% CHARLENE, 30-49% LICA), sleep apnea compliant with CPAP at night, CKD stage 3 presents to the emergency room with reports of mild back discomfort with associated nausea and diaphoresis also reports having some shortness for breath on and off for the last couple of weeks.  Patient denies chest pain.  Patient reports back pain improved with aspirin and nitro today.  Patient reports symptoms are worse after he eats and while at rest.  In the ER, troponin I high sensitivity 37.5, BNP 14, CBC CMP unremarkable, EKG with nonspecific ST and T-wave abnormality chest x-ray nonacute, CTA chest with no evidence of pulmonary embolus, Subsegmental atelectasis of the bilateral lungs.      He woke up this morning with severe back pain associated with nausea sweating diaphoresis and shortness of breath.  He took a sublingually nitroglycerin with relief.  He had coronary artery bypass performed at [facility] by Dr. Montes.  He has had 5 stents since his bypass.  The last 1 was performed by Dr. Franco at [facility] a proximally 5 years ago.  The patient is totally asymptomatic at present unfortunately he had a CTA of the chest.  Will go ahead and hydrate him well and follow his renal function.  He will need a diagnostic coronary arteriogram in the near future  depending upon his renal function.        Review of patient's allergies indicates:   Allergen Reactions    Carvedilol      Pedal edema    Imdur [isosorbide mononitrate]      headaches       Past Medical History:   Diagnosis Date    Bilateral carotid artery stenosis 12/15/2021    Bilateral carotid artery stenosis 12/15/2021    Coronary artery disease     Hypertension     Mixed hyperlipidemia 12/15/2021    Pure hypercholesterolemia 10/1/2018    Stroke 01/2018    amarousus fugax left    Thyroid disease      Past Surgical History:   Procedure Laterality Date    CARDIAC CATHETERIZATION  03/2015    x 2    CORONARY ANGIOPLASTY  12/15    GREGORIA X 2 left main and SVG to PDA    CORONARY ARTERY BYPASS GRAFT  07/2005    x 4    TOTAL THYROIDECTOMY       Social History     Tobacco Use    Smoking status: Never    Smokeless tobacco: Never   Substance Use Topics    Alcohol use: No        REVIEW OF SYSTEMS  CONSTITUTIONAL: Negative for chills, fatigue and fever.   EYES: No double vision, No blurred vision  NEURO: No headaches, No dizziness  RESPIRATORY ; shortness of breath  CARDIOVASCULAR:  Back pain associated with nausea  and diaphoresis  GI: Negative for abdominal pain, No melena, diarrhea, positive for nausea  : Negative for dysuria and frequency, Negative for hematuria  SKIN: Negative for bruising, Negative for edema or discoloration noted.     OBJECTIVE     VITAL SIGNS (Most Recent)  Temp: 98 °F (36.7 °C) (07/05/24 0804)  Pulse: 71 (07/05/24 1429)  Resp: 14 (07/05/24 1434)  BP: 132/71 (07/05/24 1429)  SpO2: 95 % (07/05/24 1434)    VENTILATION STATUS  Resp: 14 (07/05/24 1434)  SpO2: 95 % (07/05/24 1434)           I & O (Last 24H):No intake or output data in the 24 hours ending 07/05/24 1451    WEIGHTS  Wt Readings from Last 1 Encounters:   07/05/24 0804 104.3 kg (230 lb)       PHYSICAL EXAM  GENERAL: well built, well nourished, well-developed in no apparent distress alert and oriented.  Overweight  HEENT: Normocephalic. Pupils  normal and conjunctivae normal.  Mucous membranes normal, no cyanosis or icterus, trachea central,no pallor or icterus is noted..   NECK: No JVD. No bruit..   THYROID: Thyroid not enlarged. No nodules present..   CARDIAC: Regular rate and rhythm. S1 is normal.S2 is normal.No gallops, clicks or murmurs noted at this time.  CHEST ANATOMY: normal.   LUNGS: Clear to auscultation. No wheezing or rhonchi..   ABDOMEN: Soft no masses or organomegaly.  No abdomen pulsations or bruits.  Normal bowel sounds. No pulsations and no masses felt, No guarding or rebound.   URINARY: No nelson catheter   EXTREMITIES: No cyanosis, clubbing or edema noted at this time., no calf tenderness bilaterally.   PERIPHERAL VASCULAR SYSTEM: Good palpable distal pulses.   CENTRAL NERVOUS SYSTEM: No focal motor or sensory deficits noted.   SKIN: Skin without lesions, moist, well perfused.   MUSCLE STRENGTH & TONE: No noteable weakness, atrophy or abnormal movement.     HOME MEDICATIONS:  No current facility-administered medications on file prior to encounter.     Current Outpatient Medications on File Prior to Encounter   Medication Sig Dispense Refill    amLODIPine (NORVASC) 5 MG tablet Take 1 tablet (5 mg total) by mouth once daily. 90 tablet 3    aspirin (ASPIR-81 ORAL) Take 81 mg by mouth once daily.      coenzyme Q10 10 mg capsule Take 200 mg by mouth once daily.      ezetimibe (ZETIA) 10 mg tablet Take 1 tablet (10 mg total) by mouth once daily. 90 tablet 3    fish oil-omega-3 fatty acids 300-1,000 mg capsule Take 1,200 g by mouth once daily.      levothyroxine (SYNTHROID) 125 MCG tablet Take 125 mcg by mouth once daily.   3    losartan (COZAAR) 100 MG tablet TAKE 1 TABLET(100 MG) BY MOUTH EVERY DAY (Patient taking differently: Take 100 mg by mouth once daily.) 90 tablet 3    ranolazine (RANEXA) 500 MG Tb12 Take 1 tablet (500 mg total) by mouth 2 (two) times daily. 180 tablet 3    rosuvastatin (CRESTOR) 20 MG tablet Take 1 tablet (20 mg total)  by mouth once daily. 90 tablet 3    clopidogreL (PLAVIX) 75 mg tablet Take 75 mg by mouth once daily. (Patient not taking: Reported on 7/5/2024)      etodolac (LODINE) 400 MG tablet Take 400 mg by mouth 2 (two) times daily. (Patient not taking: Reported on 7/5/2024)      testosterone cypionate 200 mg/mL Kit Inject 200 mg into the muscle every 14 (fourteen) days. (Patient not taking: Reported on 7/5/2024)         SCHEDULED MEDS:   heparin (PORCINE)  46.8 Units/kg (Adjusted) Intravenous Once       CONTINUOUS INFUSIONS:   heparin (porcine) in D5W  0-40 Units/kg/hr (Adjusted) Intravenous Continuous           PRN MEDS:  Current Facility-Administered Medications:     acetaminophen, 650 mg, Oral, Q4H PRN    heparin (PORCINE), 46.8 Units/kg (Adjusted), Intravenous, PRN    heparin (PORCINE), 30 Units/kg (Adjusted), Intravenous, PRN    magnesium oxide, 800 mg, Oral, PRN    magnesium oxide, 800 mg, Oral, PRN    melatonin, 9 mg, Oral, Nightly PRN    naloxone, 0.02 mg, Intravenous, PRN    nitroGLYCERIN, 0.4 mg, Sublingual, Q5 Min PRN    ondansetron, 4 mg, Intravenous, Q6H PRN    potassium bicarbonate, 35 mEq, Oral, PRN    potassium bicarbonate, 50 mEq, Oral, PRN    potassium bicarbonate, 60 mEq, Oral, PRN    potassium, sodium phosphates, 2 packet, Oral, PRN    potassium, sodium phosphates, 2 packet, Oral, PRN    potassium, sodium phosphates, 2 packet, Oral, PRN    senna-docusate 8.6-50 mg, 1 tablet, Oral, BID PRN    sodium chloride 0.9%, 10 mL, Intravenous, PRN    sodium chloride 0.9%, 3 mL, Intravenous, Q12H PRN    LABS AND DIAGNOSTICS     CBC LAST 3 DAYS  Recent Labs   Lab 07/05/24  0857   WBC 7.49   RBC 4.75   HGB 14.0   HCT 44.1   MCV 93   MCH 29.5   MCHC 31.7*   RDW 15.0*      MPV 10.2   GRAN 62.6  4.7   LYMPH 23.6  1.8   MONO 10.0  0.8   BASO 0.08   NRBC 0       COAGULATION LAST 3 DAYS  Recent Labs   Lab 07/05/24  1351   INR 1.0   APTT 25.4       CHEMISTRY LAST 3 DAYS  Recent Labs   Lab 07/05/24  0857     "  K 4.3      CO2 25   ANIONGAP 7*   BUN 17   CREATININE 1.3      CALCIUM 9.0   MG 2.0   ALBUMIN 4.0   PROT 7.4   ALKPHOS 66   ALT 40   AST 24   BILITOT 0.4       CARDIAC PROFILE LAST 3 DAYS  Recent Labs   Lab 07/05/24  0857 07/05/24  1213   BNP 14  --    TROPONINIHS 37.5* 267.0*       ENDOCRINE LAST 3 DAYS  No results for input(s): "TSH", "PROCAL" in the last 168 hours.    LAST ARTERIAL BLOOD GAS  ABG  No results for input(s): "PH", "PO2", "PCO2", "HCO3", "BE" in the last 168 hours.    LAST 7 DAYS MICROBIOLOGY   Microbiology Results (last 7 days)       ** No results found for the last 168 hours. **            MOST RECENT IMAGING  US Carotid Bilateral  Narrative: EXAMINATION:  US CAROTID BILATERAL    CLINICAL HISTORY:  GARTH;  personal history of coronary artery disease, unstable angina, chest pain    COMPARISON:  MRA neck 01/11/2018    FINDINGS:  Color Doppler, spectral Doppler, and grayscale analysis was performed.    Grayscale images show moderate atherosclerotic plaque in bilateral carotid bulbs extending into proximal internal carotid arteries bilaterally.  Mild atherosclerotic plaque is also occurs in bilateral external carotid arteries.    Right internal carotid artery estimated peak systolic velocity is 128 cm/sec.    Antegrade flow in right vertebral artery.    Left internal carotid artery estimated peak systolic velocity is 333 cm/sec.    Antegrade flow in left vertebral artery.  Impression: 1. Greater than 70% left ICA stenosis.  2. 50-69% right ICA stenosis.  This report was flagged in Epic as abnormal.    Electronically signed by: Max Adam  Date:    07/05/2024  Time:    13:51  CTA Chest Aorta Non Coronary  Narrative: CMS MANDATED QUALITY DATA - CT RADIATION - 436    All CT scans at this facility utilize dose modulation, iterative reconstruction, and/or weight based dosing when appropriate to reduce radiation dose to as low as reasonably achievable.    EXAMINATION:  CTA CHEST AORTA NON " CORONARY    CLINICAL HISTORY:  Aortic dissection suspected;    TECHNIQUE:  CT angiography of the chest with 100 mL Omnipaque 350. Maximum intensity projection coronal reformations were created at a separate workstation and stored in the patient's permanent medical record.    COMPARISON:  None    FINDINGS:  No pulmonary embolism identified.  The thoracic aorta is normal caliber with mild calcified plaque formation.  Heart size is normal.  No enlarged mediastinal lymph nodes or mass.    The central tracheobronchial tree is patent.  There is bilateral dependent subsegmental atelectasis of the lungs.  There are geographic ground-glass opacities of the bilateral lungs, likely secondary to low inspiratory effort.    The visualized abdominal viscera are unremarkable.  There are degenerative changes of the spine with no acute osseous abnormality observed.  Median sternotomy wires noted.  Impression: 1. No pulmonary embolism identified.  2. Subsegmental atelectasis of the bilateral lungs as described.    Electronically signed by: Ernie Rubin  Date:    07/05/2024  Time:    11:20  X-Ray Chest AP Portable  Narrative: EXAMINATION:  XR CHEST AP PORTABLE    CLINICAL HISTORY:  Chest Pain; and weakness    FINDINGS:  Portable chest radiograph at 08:14 hours compared to prior exams shows median sternotomy wires and mediastinal surgical clips, with the cardiomediastinal silhouette and pulmonary vasculature within normal limits.    The lungs are normally expanded, with no consolidation, large pleural effusion, or evidence of pulmonary edema. No pneumothorax. There are no significant osseous abnormalities.  Impression: No evidence of active cardiopulmonary disease.    Electronically signed by: Jose E Cantu  Date:    07/05/2024  Time:    08:28      ECHOCARDIOGRAM RESULTS (last 5)  Results for orders placed during the hospital encounter of 11/08/22    Echo Saline Bubble? No    Interpretation Summary  · The left ventricle is normal in  size with concentric remodeling and normal systolic function.  · The estimated ejection fraction is 65%.  · Normal left ventricular diastolic function.  · The ascending aorta is mildly dilated.  · Normal right ventricular size with normal right ventricular systolic function.  · Mild left atrial enlargement.  · Normal central venous pressure (3 mmHg).  · The estimated PA systolic pressure is 19 mmHg.  · IVC clearly collapsing, suspect non-cardiac cause of peripheral edema      Results for orders placed in visit on 02/11/22    Echo    Interpretation Summary  · The left ventricle is normal in size with moderate concentric hypertrophy and normal systolic function.  · The estimated ejection fraction is 55%.  · Normal left ventricular diastolic function.  · Normal right ventricular size with normal right ventricular systolic function.  · Normal central venous pressure (3 mmHg).  · The estimated PA systolic pressure is 18 mmHg.      Results for orders placed during the hospital encounter of 06/05/20    Echo Color Flow Doppler? Yes    Interpretation Summary  · Normal left ventricular systolic function. The estimated ejection fraction is 55%.  · Normal LV diastolic function.  · No wall motion abnormalities.  · Normal right ventricular systolic function.  · Mild right ventricular enlargement.  · Mild left atrial enlargement.  · Intermediate central venous pressure (8 mmHg).  · The aortic root is mildly dilated.  · The study quality was technically difficult. The study was difficult due to patient's body habitus.      Results for orders placed during the hospital encounter of 01/10/18    2D echo with color flow doppler    Narrative  Date of Procedure: 01/10/2018        TEST DESCRIPTION  Technical Quality: This is a technically challenging study.    Aorta: The aortic root is mildly enlarged, measuring 3.6 cm at sinotubular junction and 4.1 cm at Sinuses of Valsalva. The proximal ascending aorta is mildly enlarged, measuring 4.4  cm across.    Left Atrium: The left atrial volume index is mildly enlarged, measuring 39.93 cc/m2.    Left Ventricle: The left ventricle is normal in size, with an end-diastolic diameter of 4.1 cm, and an end-systolic diameter of 2.5 cm. Septal wall thickness is mildly increased, and posterior wall thickness is upper limit of normal in size, with the  septum measuring 1.2 cm and the posterior wall measuring 1.1 cm across. Relative wall thickness was increased at 0.54, and the LV mass index was 84.0 g/m2 consistent with concentric remodeling. There are no regional wall motion abnormalities. Left  ventricular systolic function appears normal. Visually estimated ejection fraction is 60-65%. The LV Doppler derived stroke volume equals 85.0 ccs.    Diastolic indices: E wave velocity 1.0 m/s, E/A ratio 1.0,  msec., E/e' ratio(avg) 14. Diastolic function is indeterminate.    Right Atrium: The right atrium is normal in size, measuring 5.7 cm in length and 3.4 cm in width in the apical view.    Right Ventricle: The right ventricle is normal in size measuring 3.6 cm at the base in the apical right ventricle-focused view. Global right ventricular systolic function appears normal. Tricuspid annular plane systolic excursion (TAPSE) is 2.1 cm. The  estimated PA systolic pressure is 23 mmHg.    Aortic Valve:  Aortic valve is normal in structure with normal leaflet mobility.    Mitral Valve:  Mitral valve is normal in structure with normal leaflet mobility.    Tricuspid Valve:  There is mild tricuspid regurgitation. Tricuspid valve is normal in structure with normal leaflet mobility.    Pulmonary Valve:  The pulmonic valve is not well seen. There is mild pulmonic regurgitation.    IVC: IVC is normal in size and collapses > 50% with a sniff, suggesting normal right atrial pressure of 3 mmHg.    Intracavitary: There is no evidence of pericardial effusion, intracavity mass, thrombi, or vegetation.        CONCLUSIONS  1 - Normal  left ventricular systolic function (EF 60-65%).  2 - No wall motion abnormalities.  3 - Concentric remodeling.  4 - Mild left atrial enlargement.  5 - Mildly enlarged ascending aorta.  6 - Indeterminate LV diastolic function.  7 - Normal right ventricular systolic function .  8 - The estimated PA systolic pressure is 23 mmHg.  9 - Mild tricuspid regurgitation.            This document has been electronically  SIGNED BY: Marcie Lambert MD On: 01/11/2018 08:29      CURRENT/PREVIOUS VISIT EKG  Results for orders placed or performed during the hospital encounter of 07/05/24   EKG 12-lead    Collection Time: 07/05/24  8:11 AM   Result Value Ref Range    QRS Duration 94 ms    [facility] QTC Calculation 416 ms    Narrative    Test Reason : R07.9,    Vent. Rate : 068 BPM     Atrial Rate : 068 BPM     P-R Int : 180 ms          QRS Dur : 094 ms      QT Int : 392 ms       P-R-T Axes : 032 039 094 degrees     QTc Int : 416 ms    Normal sinus rhythm  Nonspecific ST and T wave abnormality  Abnormal ECG  When compared with ECG of 15-DEC-2021 09:29,  No significant change was found    Referred By: KATHRINE KENDALL           Confirmed By:            ASSESSMENT/PLAN:     Active Hospital Problems    Diagnosis    *Unstable angina    BMI 33.0-33.9,adult    Back pain    Bilateral carotid artery stenosis    Essential hypertension       ASSESSMENT & PLAN:   NSTEMI he is pain-free at present.  Unfortunately he received contrast for the chest CT will hydrate him and perform a diagnostic coronary arteriogram in the next 48-72 hours if he does not have recurrent chest pains.  And his renal function remained stable    Coronary artery disease with previous coronary artery bypass and percutaneous revascularization.    Essential hypertension    Hypercholesterolemia on statin fish oil and ezetimibe          RECOMMENDATIONS:  Adequate hydration post chest CT  Anticoagulation  Follow renal function  Coronary arteriogram in the next few days    Thank you for  the consult. I will continue to follow the patient and provide recommendations as needed.    Dhruv Barrett MD  Date of Service: 07/05/2024  2:51 PM

## 2024-07-05 NOTE — SUBJECTIVE & OBJECTIVE
Past Medical History:   Diagnosis Date    Bilateral carotid artery stenosis 12/15/2021    Bilateral carotid artery stenosis 12/15/2021    Coronary artery disease     Hypertension     Mixed hyperlipidemia 12/15/2021    Pure hypercholesterolemia 10/1/2018    Stroke 01/2018    amarousus fugax left    Thyroid disease        Past Surgical History:   Procedure Laterality Date    CARDIAC CATHETERIZATION  03/2015    x 2    CORONARY ANGIOPLASTY  12/15    GREGORIA X 2 left main and SVG to PDA    CORONARY ARTERY BYPASS GRAFT  07/2005    x 4    TOTAL THYROIDECTOMY         Review of patient's allergies indicates:   Allergen Reactions    Carvedilol      Pedal edema    Imdur [isosorbide mononitrate]      headaches       No current facility-administered medications on file prior to encounter.     Current Outpatient Medications on File Prior to Encounter   Medication Sig    amLODIPine (NORVASC) 5 MG tablet Take 1 tablet (5 mg total) by mouth once daily.    aspirin (ASPIR-81 ORAL) Take 81 mg by mouth once daily.    coenzyme Q10 10 mg capsule Take 200 mg by mouth once daily.    ezetimibe (ZETIA) 10 mg tablet Take 1 tablet (10 mg total) by mouth once daily.    fish oil-omega-3 fatty acids 300-1,000 mg capsule Take 1,200 g by mouth once daily.    levothyroxine (SYNTHROID) 125 MCG tablet Take 125 mcg by mouth once daily.     losartan (COZAAR) 100 MG tablet TAKE 1 TABLET(100 MG) BY MOUTH EVERY DAY (Patient taking differently: Take 100 mg by mouth once daily.)    ranolazine (RANEXA) 500 MG Tb12 Take 1 tablet (500 mg total) by mouth 2 (two) times daily.    rosuvastatin (CRESTOR) 20 MG tablet Take 1 tablet (20 mg total) by mouth once daily.    clopidogreL (PLAVIX) 75 mg tablet Take 75 mg by mouth once daily. (Patient not taking: Reported on 7/5/2024)    etodolac (LODINE) 400 MG tablet Take 400 mg by mouth 2 (two) times daily. (Patient not taking: Reported on 7/5/2024)    testosterone cypionate 200 mg/mL Kit Inject 200 mg into the muscle every 14  (fourteen) days. (Patient not taking: Reported on 7/5/2024)     Family History       Problem Relation (Age of Onset)    Heart attack Father, Paternal Grandfather    Heart disease Sister    Hypertension Mother, Sister          Tobacco Use    Smoking status: Never    Smokeless tobacco: Never   Substance and Sexual Activity    Alcohol use: No    Drug use: Not on file    Sexual activity: Not on file     Review of Systems   Constitutional:  Positive for diaphoresis.   Respiratory:  Positive for shortness of breath. Negative for cough, chest tightness and wheezing.    Cardiovascular:  Negative for chest pain and palpitations.   Gastrointestinal:  Positive for nausea. Negative for constipation, diarrhea and vomiting.   Genitourinary: Negative.      Objective:     Vital Signs (Most Recent):  Temp: 98 °F (36.7 °C) (07/05/24 0804)  Pulse: 72 (07/05/24 0804)  Resp: 18 (07/05/24 0804)  BP: 133/80 (07/05/24 0804)  SpO2: 95 % (07/05/24 0804) Vital Signs (24h Range):  Temp:  [98 °F (36.7 °C)] 98 °F (36.7 °C)  Pulse:  [72] 72  Resp:  [18] 18  SpO2:  [95 %] 95 %  BP: (133)/(80) 133/80     Weight: 104.3 kg (230 lb)  Body mass index is 33 kg/m².     Physical Exam  Vitals reviewed.   Constitutional:       General: He is not in acute distress.     Appearance: Normal appearance. He is obese. He is not ill-appearing.   HENT:      Head: Normocephalic and atraumatic.      Mouth/Throat:      Mouth: Mucous membranes are moist.   Eyes:      Extraocular Movements: Extraocular movements intact.      Pupils: Pupils are equal, round, and reactive to light.   Cardiovascular:      Rate and Rhythm: Normal rate and regular rhythm.      Heart sounds: No murmur heard.  Pulmonary:      Effort: Pulmonary effort is normal.   Abdominal:      General: Bowel sounds are normal.      Palpations: Abdomen is soft.      Tenderness: There is no abdominal tenderness.   Musculoskeletal:         General: No swelling. Normal range of motion.      Cervical back: Neck  supple.   Skin:     General: Skin is warm and dry.   Neurological:      General: No focal deficit present.      Mental Status: He is alert. Mental status is at baseline.   Psychiatric:         Mood and Affect: Mood normal.              CRANIAL NERVES     CN III, IV, VI   Pupils are equal, round, and reactive to light.       Significant Labs: All pertinent labs within the past 24 hours have been reviewed.  Recent Lab Results         07/05/24  0857        Albumin 4.0       ALP 66       ALT 40       Anion Gap 7       AST 24       Baso # 0.08       Basophil % 1.1       BILIRUBIN TOTAL 0.4  Comment: For infants and newborns, interpretation of results should be based  on gestational age, weight and in agreement with clinical  observations.    Premature Infant recommended reference ranges:  Up to 24 hours.............<8.0 mg/dL  Up to 48 hours............<12.0 mg/dL  3-5 days..................<15.0 mg/dL  6-29 days.................<15.0 mg/dL         BNP 14  Comment: Values of less than 100 pg/ml are consistent with non-CHF populations.       BUN 17       Calcium 9.0       Chloride 109       CO2 25       Creatinine 1.3       Differential Method Automated       eGFR 59.5       Eos # 0.2       Eos % 2.3       Glucose 105       Gran # (ANC) 4.7       Gran % 62.6       Hematocrit 44.1       Hemoglobin 14.0       Immature Grans (Abs) 0.03  Comment: Mild elevation in immature granulocytes is non specific and   can be seen in a variety of conditions including stress response,   acute inflammation, trauma and pregnancy. Correlation with other   laboratory and clinical findings is essential.         Immature Granulocytes 0.4       Lymph # 1.8       Lymph % 23.6       Magnesium  2.0       MCH 29.5       MCHC 31.7       MCV 93       Mono # 0.8       Mono % 10.0       MPV 10.2       nRBC 0       Platelet Count 254       Potassium 4.3       PROTEIN TOTAL 7.4       RBC 4.75       RDW 15.0       Sodium 141       Troponin I High  Sensitivity 37.5  Comment: Troponin results differ between methods. Do not use   results between Troponin methods interchangeably.    Alkaline Phospatase levels above 400 U/L may   cause false positive results.    Access hsTnI should not be used for patients taking   Asfotase mauro (Strensiq).         WBC 7.49               Significant Imaging: I have reviewed all pertinent imaging results/findings within the past 24 hours.  Imaging Results              CTA Chest Aorta Non Coronary (Final result)  Result time 07/05/24 11:20:08      Final result by Ernie Rubin DO (07/05/24 11:20:08)                   Impression:      1. No pulmonary embolism identified.  2. Subsegmental atelectasis of the bilateral lungs as described.      Electronically signed by: Ernie Rubin  Date:    07/05/2024  Time:    11:20               Narrative:      CMS MANDATED QUALITY DATA - CT RADIATION - 436    All CT scans at this facility utilize dose modulation, iterative reconstruction, and/or weight based dosing when appropriate to reduce radiation dose to as low as reasonably achievable.    EXAMINATION:  CTA CHEST AORTA NON CORONARY    CLINICAL HISTORY:  Aortic dissection suspected;    TECHNIQUE:  CT angiography of the chest with 100 mL Omnipaque 350. Maximum intensity projection coronal reformations were created at a separate workstation and stored in the patient's permanent medical record.    COMPARISON:  None    FINDINGS:  No pulmonary embolism identified.  The thoracic aorta is normal caliber with mild calcified plaque formation.  Heart size is normal.  No enlarged mediastinal lymph nodes or mass.    The central tracheobronchial tree is patent.  There is bilateral dependent subsegmental atelectasis of the lungs.  There are geographic ground-glass opacities of the bilateral lungs, likely secondary to low inspiratory effort.    The visualized abdominal viscera are unremarkable.  There are degenerative changes of the spine with no acute  osseous abnormality observed.  Median sternotomy wires noted.                                       X-Ray Chest AP Portable (Final result)  Result time 07/05/24 08:28:00      Final result by Jose E Cantu MD (07/05/24 08:28:00)                   Impression:      No evidence of active cardiopulmonary disease.      Electronically signed by: Jose E Cantu  Date:    07/05/2024  Time:    08:28               Narrative:    EXAMINATION:  XR CHEST AP PORTABLE    CLINICAL HISTORY:  Chest Pain; and weakness    FINDINGS:  Portable chest radiograph at 08:14 hours compared to prior exams shows median sternotomy wires and mediastinal surgical clips, with the cardiomediastinal silhouette and pulmonary vasculature within normal limits.    The lungs are normally expanded, with no consolidation, large pleural effusion, or evidence of pulmonary edema. No pneumothorax. There are no significant osseous abnormalities.

## 2024-07-05 NOTE — Clinical Note
The catheter was repositioned into the ostial SVG graft. An angiography was performed of the graft. SVG to diag

## 2024-07-05 NOTE — Clinical Note
The catheter was inserted over the wire into the ostial SVG graft. An angiography was performed of the graft. Multiple views were taken. The angiography was performed via hand injection with 10 mL of contrast.  Contrast estimated

## 2024-07-05 NOTE — Clinical Note
The catheter was repositioned into the ostial SVG graft. An angiography was performed of the graft. Multiple views were taken. The angiography was performed via hand injection with 10 mL of contrast.  Contrast estimated  SVG to diag

## 2024-07-05 NOTE — ED PROVIDER NOTES
"Encounter Date: 7/5/2024       History     Chief Complaint   Patient presents with    Back Pain     UPPER, BTWN SHOULDER BLADES, " I THINK I HAD A HEART EVENT"    GEN WEAKNESS     SINCE TAKING NITRO     69-year-old male history of CAD and other comorbidities presents with mid back discomfort, associated nausea and diaphoresis for which he says feels like his previous MI. no chest pain or shortness a breath, symptoms alleviated this morning with aspirin and nitro, symptoms occurred while at rest.  Son bedside also provides history    The history is provided by the patient and a relative.     Review of patient's allergies indicates:   Allergen Reactions    Carvedilol      Pedal edema    Imdur [isosorbide mononitrate]      headaches     Past Medical History:   Diagnosis Date    Bilateral carotid artery stenosis 12/15/2021    Bilateral carotid artery stenosis 12/15/2021    Coronary artery disease     Hypertension     Mixed hyperlipidemia 12/15/2021    Pure hypercholesterolemia 10/1/2018    Stroke 01/2018    amarousus fugax left    Thyroid disease      Past Surgical History:   Procedure Laterality Date    CARDIAC CATHETERIZATION  03/2015    x 2    CORONARY ANGIOPLASTY  12/15    GREGORIA X 2 left main and SVG to PDA    CORONARY ARTERY BYPASS GRAFT  07/2005    x 4    TOTAL THYROIDECTOMY       Family History   Problem Relation Name Age of Onset    Hypertension Mother      Heart attack Father      Heart disease Sister      Hypertension Sister      Heart attack Paternal Grandfather       Social History     Tobacco Use    Smoking status: Never    Smokeless tobacco: Never   Substance Use Topics    Alcohol use: No     Review of Systems   All other systems reviewed and are negative.      Physical Exam     Initial Vitals [07/05/24 0804]   BP Pulse Resp Temp SpO2   133/80 72 18 98 °F (36.7 °C) 95 %      MAP       --         Physical Exam    Nursing note and vitals reviewed.  Constitutional: Vital signs are normal.  Non-toxic appearance. " No distress.   HENT:   Head: Normocephalic.   Eyes: No scleral icterus.   Pulmonary/Chest: No stridor. No respiratory distress.   Bilateral chest rise   Abdominal: There is no guarding.   Musculoskeletal:         General: No tenderness.      Cervical back: No rigidity.     Neurological: He is alert.   Skin: Skin is warm and dry. No rash noted.   Psychiatric: His speech is normal. He is not actively hallucinating.   Not anxious  or agitated         ED Course   Critical Care    Date/Time: 7/5/2024 2:19 PM    Performed by: Delfino Nieto Jr., DO  Authorized by: Delfino Nieto Jr., DO  Direct patient critical care time: 10 minutes  Ordering / reviewing critical care time: 12 minutes  Documentation critical care time: 10 minutes  Total critical care time (exclusive of procedural time) : 32 minutes        Labs Reviewed   CBC W/ AUTO DIFFERENTIAL - Abnormal; Notable for the following components:       Result Value    MCHC 31.7 (*)     RDW 15.0 (*)     All other components within normal limits   COMPREHENSIVE METABOLIC PANEL - Abnormal; Notable for the following components:    eGFR 59.5 (*)     Anion Gap 7 (*)     All other components within normal limits   TROPONIN I HIGH SENSITIVITY - Abnormal; Notable for the following components:    Troponin I High Sensitivity 37.5 (*)     All other components within normal limits   TROPONIN I HIGH SENSITIVITY - Abnormal; Notable for the following components:    Troponin I High Sensitivity 267.0 (*)     All other components within normal limits   MAGNESIUM   B-TYPE NATRIURETIC PEPTIDE   TROPONIN I HIGH SENSITIVITY   APTT   PROTIME-INR        ECG Results              EKG 12-lead (In process)        Collection Time Result Time QRS Duration [facility] QTC Calculation    07/05/24 08:11:14 07/05/24 08:38:50 94 416                     In process by Interface, Lab In [facility] (07/05/24 08:39:00)                   Narrative:    Test Reason : R07.9,    Vent. Rate : 068 BPM     Atrial Rate : 068  BPM     P-R Int : 180 ms          QRS Dur : 094 ms      QT Int : 392 ms       P-R-T Axes : 032 039 094 degrees     QTc Int : 416 ms    Normal sinus rhythm  Nonspecific ST and T wave abnormality  Abnormal ECG  When compared with ECG of 15-DEC-2021 09:29,  No significant change was found    Referred By: KATHRINE KENDALL           Confirmed By:                                   Imaging Results               US Carotid Bilateral (Final result)  Result time 07/05/24 13:51:53      Final result by Max Adam MD (07/05/24 13:51:53)                   Impression:      1. Greater than 70% left ICA stenosis.  2. 50-69% right ICA stenosis.  This report was flagged in Epic as abnormal.      Electronically signed by: Max Adam  Date:    07/05/2024  Time:    13:51               Narrative:    EXAMINATION:  US CAROTID BILATERAL    CLINICAL HISTORY:  GARTH;  personal history of coronary artery disease, unstable angina, chest pain    COMPARISON:  MRA neck 01/11/2018    FINDINGS:  Color Doppler, spectral Doppler, and grayscale analysis was performed.    Grayscale images show moderate atherosclerotic plaque in bilateral carotid bulbs extending into proximal internal carotid arteries bilaterally.  Mild atherosclerotic plaque is also occurs in bilateral external carotid arteries.    Right internal carotid artery estimated peak systolic velocity is 128 cm/sec.    Antegrade flow in right vertebral artery.    Left internal carotid artery estimated peak systolic velocity is 333 cm/sec.    Antegrade flow in left vertebral artery.                                       CTA Chest Aorta Non Coronary (Final result)  Result time 07/05/24 11:20:08      Final result by Ernie Rubin DO (07/05/24 11:20:08)                   Impression:      1. No pulmonary embolism identified.  2. Subsegmental atelectasis of the bilateral lungs as described.      Electronically signed by: Ernie Rubin  Date:    07/05/2024  Time:    11:20               Narrative:       CMS MANDATED QUALITY DATA - CT RADIATION - 436    All CT scans at this facility utilize dose modulation, iterative reconstruction, and/or weight based dosing when appropriate to reduce radiation dose to as low as reasonably achievable.    EXAMINATION:  CTA CHEST AORTA NON CORONARY    CLINICAL HISTORY:  Aortic dissection suspected;    TECHNIQUE:  CT angiography of the chest with 100 mL Omnipaque 350. Maximum intensity projection coronal reformations were created at a separate workstation and stored in the patient's permanent medical record.    COMPARISON:  None    FINDINGS:  No pulmonary embolism identified.  The thoracic aorta is normal caliber with mild calcified plaque formation.  Heart size is normal.  No enlarged mediastinal lymph nodes or mass.    The central tracheobronchial tree is patent.  There is bilateral dependent subsegmental atelectasis of the lungs.  There are geographic ground-glass opacities of the bilateral lungs, likely secondary to low inspiratory effort.    The visualized abdominal viscera are unremarkable.  There are degenerative changes of the spine with no acute osseous abnormality observed.  Median sternotomy wires noted.                                       X-Ray Chest AP Portable (Final result)  Result time 07/05/24 08:28:00      Final result by Jose E Cantu MD (07/05/24 08:28:00)                   Impression:      No evidence of active cardiopulmonary disease.      Electronically signed by: Jose E Cantu  Date:    07/05/2024  Time:    08:28               Narrative:    EXAMINATION:  XR CHEST AP PORTABLE    CLINICAL HISTORY:  Chest Pain; and weakness    FINDINGS:  Portable chest radiograph at 08:14 hours compared to prior exams shows median sternotomy wires and mediastinal surgical clips, with the cardiomediastinal silhouette and pulmonary vasculature within normal limits.    The lungs are normally expanded, with no consolidation, large pleural effusion, or evidence of pulmonary  edema. No pneumothorax. There are no significant osseous abnormalities.                                       Medications   sodium chloride 0.9% flush 10 mL (has no administration in time range)   acetaminophen tablet 650 mg (has no administration in time range)   sodium chloride 0.9% flush 3 mL (has no administration in time range)   melatonin tablet 9 mg (has no administration in time range)   ondansetron injection 4 mg (has no administration in time range)   senna-docusate 8.6-50 mg per tablet 1 tablet (has no administration in time range)   naloxone 0.4 mg/mL injection 0.02 mg (has no administration in time range)   potassium bicarbonate disintegrating tablet 50 mEq (has no administration in time range)   potassium bicarbonate disintegrating tablet 35 mEq (has no administration in time range)   potassium bicarbonate disintegrating tablet 60 mEq (has no administration in time range)   magnesium oxide tablet 800 mg (has no administration in time range)   magnesium oxide tablet 800 mg (has no administration in time range)   potassium, sodium phosphates 280-160-250 mg packet 2 packet (has no administration in time range)   potassium, sodium phosphates 280-160-250 mg packet 2 packet (has no administration in time range)   potassium, sodium phosphates 280-160-250 mg packet 2 packet (has no administration in time range)   nitroGLYCERIN SL tablet 0.4 mg (has no administration in time range)   heparin 25,000 units in dextrose 5% (100 units/ml) IV bolus from bag LOW INTENSITY nomogram - [facility] (has no administration in time range)   heparin 25,000 units in dextrose 5% 250 mL (100 units/mL) infusion LOW INTENSITY nomogram - [facility] (has no administration in time range)   heparin 25,000 units in dextrose 5% (100 units/ml) IV bolus from bag LOW INTENSITY nomogram - [facility] (has no administration in time range)   heparin 25,000 units in dextrose 5% (100 units/ml) IV bolus from bag LOW INTENSITY nomogram - [facility] (has no administration in  time range)   iohexoL (OMNIPAQUE 350) injection 100 mL (100 mLs Intravenous Given 7/5/24 1041)     Medical Decision Making  69-year-old male presents with concern for cardiac event, history of CAD, currently asymptomatic, symptoms resolved this morning with nitroglycerin and aspirin.  EKG with no STEMI, workup initiated with mildly elevated troponin, history of aortic aneurysm, CT imaging obtained with no evidence of any aortic dissection.  Patient agreeable with admission, spoke with hospitalist team who will admit for further management evaluation    Amount and/or Complexity of Data Reviewed  Labs: ordered. Decision-making details documented in ED Course.  Radiology: ordered and independent interpretation performed.     Details: No large pleural effusion  ECG/medicine tests: ordered and independent interpretation performed.     Details: Rate 68, normal sinus rhythm, QRS 94 within , no STEMI  Discussion of management or test interpretation with external provider(s): Spoke with Regina Lucero NP with hospitalist team, will admit for further management and evaluation      Risk  Prescription drug management.  Decision regarding hospitalization.               ED Course as of 07/05/24 1419   Fri Jul 05, 2024   0943 WBC: 7.49 [KB]   0943 Hemoglobin: 14.0 [KB]   0943 Hematocrit: 44.1 [KB]   1002 Magnesium : 2.0 [KB]   1002 BNP: 14 [KB]   1002 Troponin I High Sensitivity(!): 37.5 [KB]   1002 Sodium: 141 [KB]   1002 Potassium: 4.3 [KB]   1002 Chloride: 109 [KB]   1002 CO2: 25 [KB]   1002 Glucose: 105 [KB]   1002 BUN: 17 [KB]   1002 Creatinine: 1.3 [KB]   1002 Calcium: 9.0 [KB]   1002 Albumin: 4.0 [KB]   1002 ALP: 66 [KB]   1002 AST: 24 [KB]   1002 ALT: 40 [KB]   1002 eGFR(!): 59.5 [KB]      ED Course User Index  [KB] Delfino Nieto Jr.,                            Clinical Impression:  Final diagnoses:  [R07.9] Chest pain  [I20.0] Unstable angina (Primary)  [R79.89] Elevated troponin          ED Disposition  Condition    Observation                 Delfino Nieto Jr., DO  07/05/24 1219       Delfino Nieto Jr., DO  07/05/24 1414

## 2024-07-06 LAB
ALBUMIN SERPL BCP-MCNC: 3.7 G/DL (ref 3.5–5.2)
ALP SERPL-CCNC: 65 U/L (ref 55–135)
ALT SERPL W/O P-5'-P-CCNC: 37 U/L (ref 10–44)
ANION GAP SERPL CALC-SCNC: 7 MMOL/L (ref 8–16)
APTT PPP: 42.4 SEC (ref 21–32)
APTT PPP: 49 SEC (ref 21–32)
AST SERPL-CCNC: 20 U/L (ref 10–40)
BASOPHILS # BLD AUTO: 0.05 K/UL (ref 0–0.2)
BASOPHILS NFR BLD: 0.5 % (ref 0–1.9)
BILIRUB SERPL-MCNC: 0.4 MG/DL (ref 0.1–1)
BUN SERPL-MCNC: 16 MG/DL (ref 8–23)
CALCIUM SERPL-MCNC: 8.7 MG/DL (ref 8.7–10.5)
CHLORIDE SERPL-SCNC: 107 MMOL/L (ref 95–110)
CHOLEST SERPL-MCNC: 117 MG/DL (ref 120–199)
CHOLEST/HDLC SERPL: 3.3 {RATIO} (ref 2–5)
CO2 SERPL-SCNC: 25 MMOL/L (ref 23–29)
CREAT SERPL-MCNC: 1.1 MG/DL (ref 0.5–1.4)
DIFFERENTIAL METHOD BLD: ABNORMAL
EOSINOPHIL # BLD AUTO: 0.2 K/UL (ref 0–0.5)
EOSINOPHIL NFR BLD: 2.4 % (ref 0–8)
ERYTHROCYTE [DISTWIDTH] IN BLOOD BY AUTOMATED COUNT: 14.9 % (ref 11.5–14.5)
EST. GFR  (NO RACE VARIABLE): >60 ML/MIN/1.73 M^2
GLUCOSE SERPL-MCNC: 108 MG/DL (ref 70–110)
HCT VFR BLD AUTO: 43.2 % (ref 40–54)
HDLC SERPL-MCNC: 35 MG/DL (ref 40–75)
HDLC SERPL: 29.9 % (ref 20–50)
HGB BLD-MCNC: 13.7 G/DL (ref 14–18)
IMM GRANULOCYTES # BLD AUTO: 0.03 K/UL (ref 0–0.04)
IMM GRANULOCYTES NFR BLD AUTO: 0.3 % (ref 0–0.5)
LDLC SERPL CALC-MCNC: 51.6 MG/DL (ref 63–159)
LYMPHOCYTES # BLD AUTO: 2.5 K/UL (ref 1–4.8)
LYMPHOCYTES NFR BLD: 27 % (ref 18–48)
MAGNESIUM SERPL-MCNC: 1.9 MG/DL (ref 1.6–2.6)
MCH RBC QN AUTO: 29.1 PG (ref 27–31)
MCHC RBC AUTO-ENTMCNC: 31.7 G/DL (ref 32–36)
MCV RBC AUTO: 92 FL (ref 82–98)
MONOCYTES # BLD AUTO: 0.9 K/UL (ref 0.3–1)
MONOCYTES NFR BLD: 9.3 % (ref 4–15)
NEUTROPHILS # BLD AUTO: 5.6 K/UL (ref 1.8–7.7)
NEUTROPHILS NFR BLD: 60.5 % (ref 38–73)
NONHDLC SERPL-MCNC: 82 MG/DL
NRBC BLD-RTO: 0 /100 WBC
OHS QRS DURATION: 94 MS
OHS QTC CALCULATION: 435 MS
PHOSPHATE SERPL-MCNC: 3.3 MG/DL (ref 2.7–4.5)
PLATELET # BLD AUTO: 225 K/UL (ref 150–450)
PMV BLD AUTO: 10.2 FL (ref 9.2–12.9)
POTASSIUM SERPL-SCNC: 4.1 MMOL/L (ref 3.5–5.1)
PROT SERPL-MCNC: 6.9 G/DL (ref 6–8.4)
RBC # BLD AUTO: 4.71 M/UL (ref 4.6–6.2)
SODIUM SERPL-SCNC: 139 MMOL/L (ref 136–145)
TRIGL SERPL-MCNC: 152 MG/DL (ref 30–150)
TROPONIN I SERPL HS-MCNC: 93.4 PG/ML (ref 0–14.9)
WBC # BLD AUTO: 9.28 K/UL (ref 3.9–12.7)

## 2024-07-06 PROCEDURE — 36415 COLL VENOUS BLD VENIPUNCTURE: CPT | Performed by: NURSE PRACTITIONER

## 2024-07-06 PROCEDURE — 80061 LIPID PANEL: CPT | Performed by: NURSE PRACTITIONER

## 2024-07-06 PROCEDURE — 25000003 PHARM REV CODE 250: Performed by: NURSE PRACTITIONER

## 2024-07-06 PROCEDURE — 63600175 PHARM REV CODE 636 W HCPCS: Performed by: NURSE PRACTITIONER

## 2024-07-06 PROCEDURE — 99232 SBSQ HOSP IP/OBS MODERATE 35: CPT | Mod: ,,, | Performed by: INTERNAL MEDICINE

## 2024-07-06 PROCEDURE — 96366 THER/PROPH/DIAG IV INF ADDON: CPT

## 2024-07-06 PROCEDURE — 94799 UNLISTED PULMONARY SVC/PX: CPT | Mod: XB

## 2024-07-06 PROCEDURE — 96365 THER/PROPH/DIAG IV INF INIT: CPT

## 2024-07-06 PROCEDURE — 99900031 HC PATIENT EDUCATION (STAT)

## 2024-07-06 PROCEDURE — 85025 COMPLETE CBC W/AUTO DIFF WBC: CPT | Performed by: NURSE PRACTITIONER

## 2024-07-06 PROCEDURE — 12000002 HC ACUTE/MED SURGE SEMI-PRIVATE ROOM

## 2024-07-06 PROCEDURE — 84484 ASSAY OF TROPONIN QUANT: CPT | Performed by: NURSE PRACTITIONER

## 2024-07-06 PROCEDURE — 36415 COLL VENOUS BLD VENIPUNCTURE: CPT | Performed by: HOSPITALIST

## 2024-07-06 PROCEDURE — 93010 ELECTROCARDIOGRAM REPORT: CPT | Mod: ,,, | Performed by: GENERAL PRACTICE

## 2024-07-06 PROCEDURE — 99900035 HC TECH TIME PER 15 MIN (STAT)

## 2024-07-06 PROCEDURE — 93005 ELECTROCARDIOGRAM TRACING: CPT | Performed by: GENERAL PRACTICE

## 2024-07-06 PROCEDURE — 85730 THROMBOPLASTIN TIME PARTIAL: CPT | Performed by: HOSPITALIST

## 2024-07-06 PROCEDURE — 99406 BEHAV CHNG SMOKING 3-10 MIN: CPT

## 2024-07-06 PROCEDURE — 83735 ASSAY OF MAGNESIUM: CPT | Performed by: NURSE PRACTITIONER

## 2024-07-06 PROCEDURE — 94660 CPAP INITIATION&MGMT: CPT

## 2024-07-06 PROCEDURE — 94761 N-INVAS EAR/PLS OXIMETRY MLT: CPT

## 2024-07-06 PROCEDURE — 84100 ASSAY OF PHOSPHORUS: CPT | Performed by: NURSE PRACTITIONER

## 2024-07-06 PROCEDURE — 80053 COMPREHEN METABOLIC PANEL: CPT | Performed by: NURSE PRACTITIONER

## 2024-07-06 PROCEDURE — 25000003 PHARM REV CODE 250: Performed by: INTERNAL MEDICINE

## 2024-07-06 RX ORDER — AMLODIPINE BESYLATE 5 MG/1
5 TABLET ORAL 2 TIMES DAILY
Status: DISCONTINUED | OUTPATIENT
Start: 2024-07-06 | End: 2024-07-08 | Stop reason: HOSPADM

## 2024-07-06 RX ADMIN — LOSARTAN POTASSIUM 100 MG: 50 TABLET, FILM COATED ORAL at 08:07

## 2024-07-06 RX ADMIN — RANOLAZINE 500 MG: 500 TABLET, FILM COATED, EXTENDED RELEASE ORAL at 08:07

## 2024-07-06 RX ADMIN — AMLODIPINE BESYLATE 5 MG: 5 TABLET ORAL at 08:07

## 2024-07-06 RX ADMIN — ATORVASTATIN CALCIUM 40 MG: 40 TABLET, FILM COATED ORAL at 08:07

## 2024-07-06 RX ADMIN — EZETIMIBE 10 MG: 10 TABLET ORAL at 08:07

## 2024-07-06 RX ADMIN — CLOPIDOGREL BISULFATE 75 MG: 75 TABLET, FILM COATED ORAL at 08:07

## 2024-07-06 RX ADMIN — Medication 9 MG: at 08:07

## 2024-07-06 RX ADMIN — HEPARIN SODIUM 14 UNITS/KG/HR: 10000 INJECTION, SOLUTION INTRAVENOUS at 05:07

## 2024-07-06 RX ADMIN — ASPIRIN 81 MG 81 MG: 81 TABLET ORAL at 08:07

## 2024-07-06 RX ADMIN — ACETAMINOPHEN 650 MG: 325 TABLET ORAL at 12:07

## 2024-07-06 RX ADMIN — LEVOTHYROXINE SODIUM 125 MCG: 0.03 TABLET ORAL at 05:07

## 2024-07-06 NOTE — CARE UPDATE
07/06/24 0924   Patient Assessment/Suction   Level of Consciousness (AVPU) alert   Respiratory Effort Normal;Unlabored   Expansion/Accessory Muscles/Retractions no use of accessory muscles;no retractions;expansion symmetric   All Lung Fields Breath Sounds Anterior:   LUCIANA Breath Sounds clear   RUL Breath Sounds clear   Rhythm/Pattern, Respiratory unlabored;depth regular;pattern regular   Cough Frequency no cough   Skin Integrity   $ Wound Care Tech Time 15 min   Area Observed Bridge of nose   Skin Appearance without discoloration   PRE-TX-O2   Device (Oxygen Therapy) room air   SpO2 97 %   Pulse Oximetry Type Intermittent   $ Pulse Oximetry - Multiple Charge Pulse Oximetry - Multiple   Pulse 78   Resp 16   Preset CPAP/BiPAP Settings   Mode Of Delivery BiPAP;Standby   $ CPAP/BiPAP Daily Charge BiPAP/CPAP Daily   $ Is patient using?   ([facility])   Equipment Type V60   Education   $ Education 15 min;Oxygen   Tobacco Cessation Intervention   Do you use any type of tobacco product? No   $ Smoking Cessation Charges Smoking Cessation - Intermediate (Non-CTTS)   Respiratory Evaluation   $ Care Plan Tech Time 15 min   $ Respiratory Evaluation Complete   Evaluation For New Orders   Admitting Diagnosis BACK PAIN   Pulmonary Diagnosis NA   Home Oxygen   Has Home Oxygen? No   Device   (CPAP)   Home Aerosol, MDI, DPI, and Other Treatments/Therapies   Home Respiratory Therapy Per Patient/Review of Chart No   Oxygen Care Plan   Oxygen Care Plan Per Protocol   SPO2 Goal (%) 92% non-cardiac   Rationale No rational found   Bronchodilator Care Plan   Rationale No Rationale found   Atelectasis Care Plan   Rationale No Rational Found   Airway Clearance Care Plan   Rationale No rationale found

## 2024-07-06 NOTE — PROGRESS NOTES
[facility] Medicine  Progress Note    Patient Name: Preston Arzate  MRN: [id number]  Patient Class: IP- Inpatient   Admission Date: 7/5/2024  Length of Stay: 0 days  Attending Physician: Kahlil Martinez MD  Primary Care Provider: Matthew Brown IV, MD        Subjective:     Principal Problem:Unstable angina        HPI:  69-year-old male with a past medical history of coronary artery disease status post stent placement LM, SVG to PDA 2015on Plavix and aspirin, status post CABG x 2 2005 (LIMA to LAD, SVG to PDA), Enlarged ascending aorta (4.4 cm), hypertension, hyperlipidemia, bilateral carotid stenosis (20-39% CHARLENE, 30-49% LICA), sleep apnea compliant with CPAP at night, CKD stage 3 presents to the emergency room with reports of mild back discomfort with associated nausea and diaphoresis also reports having some shortness for breath on and off for the last couple of weeks.  Patient denies chest pain.  Patient reports back pain improved with aspirin and nitro today.  Patient reports symptoms are worse after he eats and while at rest.  In the ER, troponin I high sensitivity 37.5, BNP 14, CBC CMP unremarkable, EKG with nonspecific ST and T-wave abnormality chest x-ray nonacute, CTA chest with no evidence of pulmonary embolus, Subsegmental atelectasis of the bilateral lungs.  Admit to hospital medicine due to mildly elevated troponin history of aortic aneurysm, moderate heart risk with heart score 6. Trend serial troponin.       Overview/Hospital Course:  No notes on file    Interval History:  Patient was seen and examined.  He is fully oriented, no distress, denies chest pain or shortness of breath, vitals stable, afebrile.  He is tolerating diet, continues to be on heparin infusion, cardiology is planning cardiac catheterization within next 48 hours.  Continue supportive care.    Review of Systems   Constitutional:  Positive for activity change. Negative for appetite change and  diaphoresis.   HENT: Negative.     Respiratory:  Positive for shortness of breath. Negative for cough, chest tightness and wheezing.    Cardiovascular:  Negative for chest pain and palpitations.   Gastrointestinal:  Positive for nausea. Negative for constipation, diarrhea and vomiting.   Genitourinary: Negative.    Neurological: Negative.    Psychiatric/Behavioral: Negative.       Objective:     Vital Signs (Most Recent):  Temp: 97.7 °F (36.5 °C) (07/06/24 1051)  Pulse: 64 (07/06/24 1051)  Resp: 19 (07/06/24 1051)  BP: (!) 157/90 (nurse notified leilani) (07/06/24 1051)  SpO2: 97 % (07/06/24 1051) Vital Signs (24h Range):  Temp:  [97.6 °F (36.4 °C)-98.4 °F (36.9 °C)] 97.7 °F (36.5 °C)  Pulse:  [64-78] 64  Resp:  [15-20] 19  SpO2:  [94 %-98 %] 97 %  BP: (138-157)/(75-93) 157/90     Weight: 108.5 kg (239 lb 3.2 oz)  Body mass index is 34.32 kg/m².    Intake/Output Summary (Last 24 hours) at 7/6/2024 1444  Last data filed at 7/6/2024 0934  Gross per 24 hour   Intake 700 ml   Output --   Net 700 ml         Physical Exam  Vitals reviewed.   Constitutional:       General: He is not in acute distress.     Appearance: Normal appearance. He is obese. He is not ill-appearing.   HENT:      Head: Normocephalic and atraumatic.      Mouth/Throat:      Mouth: Mucous membranes are moist.   Eyes:      Extraocular Movements: Extraocular movements intact.      Pupils: Pupils are equal, round, and reactive to light.   Cardiovascular:      Rate and Rhythm: Normal rate and regular rhythm.      Heart sounds: No murmur heard.  Pulmonary:      Effort: Pulmonary effort is normal.   Abdominal:      General: Bowel sounds are normal.      Palpations: Abdomen is soft.      Tenderness: There is no abdominal tenderness.   Musculoskeletal:         General: No swelling. Normal range of motion.      Cervical back: Neck supple.   Skin:     General: Skin is warm and dry.   Neurological:      General: No focal deficit present.      Mental Status: He is  alert. Mental status is at baseline.   Psychiatric:         Mood and Affect: Mood normal.             Significant Labs: All pertinent labs within the past 24 hours have been reviewed.  BMP:   Recent Labs   Lab 07/06/24  0419         K 4.1      CO2 25   BUN 16   CREATININE 1.1   CALCIUM 8.7   MG 1.9     CBC:   Recent Labs   Lab 07/05/24  0857 07/06/24  0419   WBC 7.49 9.28   HGB 14.0 13.7*   HCT 44.1 43.2    225       Significant Imaging: I have reviewed all pertinent imaging results/findings within the past 24 hours.    Assessment/Plan:      * Unstable angina  Heart score 6  Hx of Cad, stents, cabg, htn, MI, bilateral carotid stenosis, ckd, increased cholesterol, obesity  Patient with back discomfort with associated nausea and diaphoresis, occasional shortness of breath. Relieved with aspirin and nitro  acute, trend troponin, ASA, telemetry,   Carotid US ordered  Patient is scheduled for PET scan stress test on 7/19/24 - Dr. Gutierres   Reports took aspirin and plavix today  Continue ranexa    Chart review:  Echocardiograms:   TTE 11/8/2022  The left ventricle is normal in size with concentric remodeling and normal systolic function.  The estimated ejection fraction is 65%.  Normal left ventricular diastolic function.  The ascending aorta is mildly dilated (4.16 cm)  Normal right ventricular size with normal right ventricular systolic function.  Mild left atrial enlargement.  Normal central venous pressure (3 mmHg).  The estimated PA systolic pressure is 19 mmHg.  IVC clearly collapsing, suspect non-cardiac cause of peripheral edema     TTE 2/11/2022  The left ventricle is normal in size with moderate concentric hypertrophy and normal systolic function.  The estimated ejection fraction is 55%.  Normal left ventricular diastolic function.  Normal right ventricular size with normal right ventricular systolic function.  Normal central venous pressure (3 mmHg).  The estimated PA systolic pressure  is 18 mmHg.    TTE 6/5/2020  Normal left ventricular systolic function. The estimated ejection fraction is 55%.  Normal LV diastolic function.  No wall motion abnormalities.  Normal right ventricular systolic function.  Mild right ventricular enlargement.  Mild left atrial enlargement.  Intermediate central venous pressure (8 mmHg).  The aortic root is mildly dilated.  The study quality was technically difficult. The study was difficult due to patient's body habitus.    Stress Tests:   PET Stress Test 6/22/2020    There is a very small sized, mild intensity mid inferior reversible perfusion abnormality in the distribution of thePDA indicative of focal coronary stenosis.    Within the perfusion abnormality absolute myocardial perfusion (cc/min/gm) averaged 0.60 cc/min/g at rest, 0.88 cc/min/g at stress and CFR was 1.46 cc/min/g, which equates to mildly reduced coronary flow capacity (Green) in 6% of the myocardium (within the PDA territory) .    Whole heart absolute myocardial perfusion (cc/min/g) averaged 0.75 cc/min/g at rest, 1.40 cc/min/g at stress, and 1.88  CFR.    Gated perfusion images showed an ejection fraction of 74% at rest and 75% during stress. Normal ejection fraction is greater than 51%.    Wall motion was normal at rest and during stress.    LV cavity size is normal at rest and stress.    The EKG portion of this study is abnormal but not diagnostic.    There were no arrhythmias during stress.    The patient reported no chest pain during the stress test.    When compared to the prior study on 5/14/2018, absolute stress flow and coronary flow capacity has improved in all territories.  These findings are most consistent with either physiologic improvement of CAD or likely caffeine effect on the prior study.  Overall, the perfusion patterns are visually very similar.     PET Stress test 5/14/2018  CONCLUSIONS: ABNORMAL MYOCARDIAL PERFUSION PET STRESS TEST   1. There is a very small sized mild ischemia in  the mid inferoseptal wall in the usual distribution of the Posterior Descending Artery. This defect comprises 5 % of the left ventricular myocardium.   2. There is a very small sized mild ischemia in the basilar lateral wall in the usual distribution of the Left Circumflex Artery. This defect comprises 5 % of the left ventricular myocardium.   3. Resting wall motion is physiologic. Stress wall motion is physiologic.   4. LV function is normal at rest and stress.  (normal is >= 51%)   5. The ventricular volumes are normal at rest and stress.   6. The extracardiac distribution of radioactivity is normal.   7. When compared to the previous study from 12/08/2015, the lateral wall has significantly improved.  There is also a very small region of ischemia in the PDA territory.      Caths:   [facility] 12/9/2015  1. LVEDP=15mmHg   2. LVEF=65%   3. Patent LIMA to LAD   4. Patent SVG to D1   5. Occluded SVG to OM1   6. Tandem 95% and 75% stenoses of SVG to PDA   7. 95% stenosis of distal LM   8. Small caliber diffusely diseased LCx and OM1   9. Successful PCI of SVG to PDA with GREGORIA   10. Successful PCI of distal LM with GREGORIA      Other:  Event monitor 1/11/2018  In summary this patient's monitor is consistent with sinus rhythm and a single PVC. There was no atrial fibrillation while the device was in use.      Carotid ultrasound 1/10/2018  There is 20 - 39% right Internal Carotid stenosis.   There is 40 - 49% left Internal Carotid stenosis.       Back pain  Relieved with nitro and aspirin  chest x-ray nonacute, CTA chest with no evidence of pulmonary embolus, Subsegmental atelectasis of the bilateral lungs.       BMI 33.0-33.9,adult  Body mass index is 33 kg/m². Morbid obesity complicates all aspects of disease management from diagnostic modalities to treatment. Weight loss encouraged and health benefits explained to patient.       Bilateral carotid artery stenosis  Carotid ultrasound ordered  Continue aspirin Plavix      Essential  hypertension  Chronic, controlled. Latest blood pressure and vitals reviewed-     Temp:  [98 °F (36.7 °C)]   Pulse:  [56-74]   Resp:  [13-19]   BP: (120-133)/(68-80)   SpO2:  [94 %-97 %] .   Home meds for hypertension were reviewed and noted below.   Hypertension Medications               amLODIPine (NORVASC) 5 MG tablet Take 1 tablet (5 mg total) by mouth once daily.    losartan (COZAAR) 100 MG tablet TAKE 1 TABLET(100 MG) BY MOUTH EVERY DAY            While in the hospital, will manage blood pressure as follows; Continue home antihypertensive regimen    Will utilize p.r.n. blood pressure medication only if patient's blood pressure greater than 180/110 and he develops symptoms such as worsening chest pain or shortness of breath.      VTE Risk Mitigation (From admission, onward)           Ordered     heparin 25,000 units in dextrose 5% (100 units/ml) IV bolus from bag LOW INTENSITY nomogram - [facility]  As needed (PRN)        Question:  Heparin Infusion Adjustment (DO NOT MODIFY ANSWER)  Answer:  [email or web address]\[email or web address]\Images\[email or web address]\HeparinInfusions\heparin LOW INTENSITY nomogram for [facility] [id number]    07/05/24 1336     heparin 25,000 units in dextrose 5% (100 units/ml) IV bolus from bag LOW INTENSITY nomogram - [facility]  As needed (PRN)        Question:  Heparin Infusion Adjustment (DO NOT MODIFY ANSWER)  Answer:  \[email or web address]\[email or web address]\Images\[email or web address]\HeparinInfusions\heparin LOW INTENSITY nomogram for [facility] [id number]    07/05/24 1336     heparin 25,000 units in dextrose 5% 250 mL (100 units/mL) infusion LOW INTENSITY nomogram - [facility]  Continuous        Question:  Begin at (units/kg/hr)  Answer:  12    07/05/24 1336     Place sequential compression device  Until discontinued         07/05/24 1252     IP VTE HIGH RISK PATIENT  Once         07/05/24 1232                    Discharge Planning   IFEOMA:      Code Status: Full Code   Is the patient medically ready for discharge?:     Reason for patient still in hospital (select all that  apply): Patient trending condition  Discharge Plan A: Home with family                  Kahlil Martinez MD  Department of Hospital Medicine   [facility]

## 2024-07-06 NOTE — CARE UPDATE
07/05/24 2001   Patient Assessment/Suction   Level of Consciousness (AVPU) alert   Respiratory Effort Unlabored   Expansion/Accessory Muscles/Retractions no use of accessory muscles   All Lung Fields Breath Sounds clear;equal bilaterally   Rhythm/Pattern, Respiratory no shortness of breath reported   Cough Frequency no cough   PRE-TX-O2   Device (Oxygen Therapy) room air   SpO2 98 %   Pulse Oximetry Type Continuous   $ Pulse Oximetry - Multiple Charge Pulse Oximetry - Multiple   Pulse 77   Resp 15   Positioning   Body Position position changed independently   Head of Bed (HOB) Positioning HOB elevated   Preset CPAP/BiPAP Settings   Mode Of Delivery CPAP   $ CPAP/BiPAP Daily Charge BiPAP/CPAP Daily   $ Initial CPAP/BiPAP Setup? Yes   $ Is patient using? Yes   Size of Mask Large   Sized Appropriately? Yes   Equipment Type V60   Airway Device Type large full face mask   Humidifier not applicable   CPAP (cm H2O) 12   Patient CPAP/BiPAP Settings   FiO2 Auto Set yes   RR Total (Breaths/Min) 22

## 2024-07-06 NOTE — SUBJECTIVE & OBJECTIVE
Interval History:  Patient was seen and examined.  He is fully oriented, no distress, denies chest pain or shortness of breath, vitals stable, afebrile.  He is tolerating diet, continues to be on heparin infusion, cardiology is planning cardiac catheterization within next 48 hours.  Continue supportive care.    Review of Systems   Constitutional:  Positive for activity change. Negative for appetite change and diaphoresis.   HENT: Negative.     Respiratory:  Positive for shortness of breath. Negative for cough, chest tightness and wheezing.    Cardiovascular:  Negative for chest pain and palpitations.   Gastrointestinal:  Positive for nausea. Negative for constipation, diarrhea and vomiting.   Genitourinary: Negative.    Neurological: Negative.    Psychiatric/Behavioral: Negative.       Objective:     Vital Signs (Most Recent):  Temp: 97.7 °F (36.5 °C) (07/06/24 1051)  Pulse: 64 (07/06/24 1051)  Resp: 19 (07/06/24 1051)  BP: (!) 157/90 (nurse notified leilani) (07/06/24 1051)  SpO2: 97 % (07/06/24 1051) Vital Signs (24h Range):  Temp:  [97.6 °F (36.4 °C)-98.4 °F (36.9 °C)] 97.7 °F (36.5 °C)  Pulse:  [64-78] 64  Resp:  [15-20] 19  SpO2:  [94 %-98 %] 97 %  BP: (138-157)/(75-93) 157/90     Weight: 108.5 kg (239 lb 3.2 oz)  Body mass index is 34.32 kg/m².    Intake/Output Summary (Last 24 hours) at 7/6/2024 1444  Last data filed at 7/6/2024 0934  Gross per 24 hour   Intake 700 ml   Output --   Net 700 ml         Physical Exam  Vitals reviewed.   Constitutional:       General: He is not in acute distress.     Appearance: Normal appearance. He is obese. He is not ill-appearing.   HENT:      Head: Normocephalic and atraumatic.      Mouth/Throat:      Mouth: Mucous membranes are moist.   Eyes:      Extraocular Movements: Extraocular movements intact.      Pupils: Pupils are equal, round, and reactive to light.   Cardiovascular:      Rate and Rhythm: Normal rate and regular rhythm.      Heart sounds: No murmur  heard.  Pulmonary:      Effort: Pulmonary effort is normal.   Abdominal:      General: Bowel sounds are normal.      Palpations: Abdomen is soft.      Tenderness: There is no abdominal tenderness.   Musculoskeletal:         General: No swelling. Normal range of motion.      Cervical back: Neck supple.   Skin:     General: Skin is warm and dry.   Neurological:      General: No focal deficit present.      Mental Status: He is alert. Mental status is at baseline.   Psychiatric:         Mood and Affect: Mood normal.             Significant Labs: All pertinent labs within the past 24 hours have been reviewed.  BMP:   Recent Labs   Lab 07/06/24  0419         K 4.1      CO2 25   BUN 16   CREATININE 1.1   CALCIUM 8.7   MG 1.9     CBC:   Recent Labs   Lab 07/05/24  0857 07/06/24 0419   WBC 7.49 9.28   HGB 14.0 13.7*   HCT 44.1 43.2    225       Significant Imaging: I have reviewed all pertinent imaging results/findings within the past 24 hours.

## 2024-07-06 NOTE — PROGRESS NOTES
[facility]  Department of Cardiology  Progress Note    PATIENT NAME: Preston Arzate  MRN: [id number]  TODAY'S DATE: 07/06/2024  ADMIT DATE: 7/5/2024    SUBJECTIVE     PRINCIPLE PROBLEM: Unstable angina    INTERVAL HISTORY:    7/6/2024  Denies any symptoms of chest pain.  Renal function is improving.    HPI  He woke up this morning with severe back pain associated with nausea sweating diaphoresis and shortness of breath. He took a sublingually nitroglycerin with relief. He had coronary artery bypass performed at [facility] by Dr. Montes. He has had 5 stents since his bypass. The last 1 was performed by Dr. Franco at [facility] a proximally 5 years ago. The patient is totally asymptomatic at present unfortunately he had a CTA of the chest. Will go ahead and hydrate him well and follow his renal function. He will need a diagnostic coronary arteriogram in the near future depending upon his renal function.     Review of patient's allergies indicates:   Allergen Reactions    Carvedilol      Pedal edema    Imdur [isosorbide mononitrate]      headaches       REVIEW OF SYSTEMS  CONSTITUTIONAL: Negative for chills, fatigue and fever.   EYES: No double vision, No blurred vision  NEURO: No headaches, No dizziness  RESPIRATORY ; shortness of breath  CARDIOVASCULAR:  Back pain associated with nausea  and diaphoresis  GI: Negative for abdominal pain, No melena, diarrhea, positive for nausea  : Negative for dysuria and frequency, Negative for hematuria  SKIN: Negative for bruising, Negative for edema or discoloration noted.     OBJECTIVE     VITAL SIGNS (Most Recent)  Temp: 97.7 °F (36.5 °C) (07/06/24 1051)  Pulse: 64 (07/06/24 1051)  Resp: 19 (07/06/24 1051)  BP: (!) 157/90 (nurse notified leilani) (07/06/24 1051)  SpO2: 97 % (07/06/24 1051)    VENTILATION STATUS  Resp: 19 (07/06/24 1051)  SpO2: 97 % (07/06/24 1051)           I & O (Last 24H):  Intake/Output Summary (Last 24 hours) at  7/6/2024 1303  Last data filed at 7/6/2024 0934  Gross per 24 hour   Intake 700 ml   Output --   Net 700 ml       WEIGHTS  Wt Readings from Last 1 Encounters:   07/05/24 1530 108.5 kg (239 lb 3.2 oz)   07/05/24 0804 104.3 kg (230 lb)       PHYSICAL EXAM  GENERAL: well built, well nourished, well-developed in no apparent distress alert and oriented.  Overweight  HEENT: Normocephalic. Pupils normal and conjunctivae normal.  Mucous membranes normal, no cyanosis or icterus, trachea central,no pallor or icterus is noted..   NECK: No JVD. No bruit..   THYROID: Thyroid not enlarged. No nodules present..   CARDIAC: Regular rate and rhythm. S1 is normal.S2 is normal.No gallops, clicks or murmurs noted at this time.  CHEST ANATOMY: normal.   LUNGS: Clear to auscultation. No wheezing or rhonchi..   ABDOMEN: Soft no masses or organomegaly.  No abdomen pulsations or bruits.  Normal bowel sounds. No pulsations and no masses felt, No guarding or rebound.   URINARY: No nelson catheter   EXTREMITIES: No cyanosis, clubbing or edema noted at this time., no calf tenderness bilaterally.   PERIPHERAL VASCULAR SYSTEM: Good palpable distal pulses.   CENTRAL NERVOUS SYSTEM: No focal motor or sensory deficits noted.   SKIN: Skin without lesions, moist, well perfused.   MUSCLE STRENGTH & TONE: No noteable weakness, atrophy or abnormal movement.      SCHEDULED MEDS:   amLODIPine  5 mg Oral Daily    aspirin  81 mg Oral Daily    atorvastatin  40 mg Oral QHS    clopidogreL  75 mg Oral Daily    ezetimibe  10 mg Oral Daily    levothyroxine  125 mcg Oral Before breakfast    losartan  100 mg Oral Daily    ranolazine  500 mg Oral BID       CONTINUOUS INFUSIONS:   heparin (porcine) in D5W  0-40 Units/kg/hr (Adjusted) Intravenous Continuous 12 mL/hr at 07/06/24 0501 14 Units/kg/hr at 07/06/24 0501       PRN MEDS:  Current Facility-Administered Medications:     acetaminophen, 650 mg, Oral, Q4H PRN    heparin (PORCINE), 46.8 Units/kg (Adjusted),  "Intravenous, PRN    heparin (PORCINE), 30 Units/kg (Adjusted), Intravenous, PRN    magnesium oxide, 800 mg, Oral, PRN    magnesium oxide, 800 mg, Oral, PRN    melatonin, 9 mg, Oral, Nightly PRN    naloxone, 0.02 mg, Intravenous, PRN    nitroGLYCERIN, 0.4 mg, Sublingual, Q5 Min PRN    ondansetron, 4 mg, Intravenous, Q6H PRN    potassium bicarbonate, 35 mEq, Oral, PRN    potassium bicarbonate, 50 mEq, Oral, PRN    potassium bicarbonate, 60 mEq, Oral, PRN    potassium, sodium phosphates, 2 packet, Oral, PRN    potassium, sodium phosphates, 2 packet, Oral, PRN    potassium, sodium phosphates, 2 packet, Oral, PRN    senna-docusate 8.6-50 mg, 1 tablet, Oral, BID PRN    sodium chloride 0.9%, 10 mL, Intravenous, PRN    sodium chloride 0.9%, 3 mL, Intravenous, Q12H PRN    LABS AND DIAGNOSTICS     CBC LAST 3 DAYS  Recent Labs   Lab 07/05/24  0857 07/06/24  0419   WBC 7.49 9.28   RBC 4.75 4.71   HGB 14.0 13.7*   HCT 44.1 43.2   MCV 93 92   MCH 29.5 29.1   MCHC 31.7* 31.7*   RDW 15.0* 14.9*    225   MPV 10.2 10.2   GRAN 62.6  4.7 60.5  5.6   LYMPH 23.6  1.8 27.0  2.5   MONO 10.0  0.8 9.3  0.9   BASO 0.08 0.05   NRBC 0 0       COAGULATION LAST 3 DAYS  Recent Labs   Lab 07/05/24  1351 07/05/24  2215 07/06/24  0441 07/06/24  1136   INR 1.0  --   --   --    APTT 25.4 32.6* 49.0* 42.4*       CHEMISTRY LAST 3 DAYS  Recent Labs   Lab 07/05/24  0857 07/06/24  0419    139   K 4.3 4.1    107   CO2 25 25   ANIONGAP 7* 7*   BUN 17 16   CREATININE 1.3 1.1    108   CALCIUM 9.0 8.7   PHOS  --  3.3   MG 2.0 1.9   ALBUMIN 4.0 3.7   BILITOT 0.4 0.4   PROT 7.4 6.9   ALKPHOS 66 65   ALT 40 37   AST 24 20       CARDIAC PROFILE LAST 3 DAYS  Recent Labs   Lab 07/05/24  0857 07/05/24  1213 07/05/24  1543 07/05/24  2215 07/06/24  0821   BNP 14  --   --   --   --    TROPONINIHS 37.5*   < > 372.8* 202.2* 93.4*    < > = values in this interval not displayed.       ENDOCRINE LAST 3 DAYS  No results for input(s): "TSH", " ""PROCAL" in the last 168 hours.    LAST ARTERIAL BLOOD GAS  ABG  No results for input(s): "PH", "PO2", "PCO2", "HCO3", "BE" in the last 168 hours.    LAST 7 DAYS MICROBIOLOGY   Microbiology Results (last 7 days)       ** No results found for the last 168 hours. **            MOST RECENT IMAGING  Echo    Left Ventricle: The left ventricle is normal in size. Normal wall   thickness. There is normal systolic function with a visually estimated   ejection fraction of 60 - 65%. There is normal diastolic function. E/A   ratio is 0.98.    Right Ventricle: Normal right ventricular cavity size. Systolic   function is normal.    Mitral Valve: There is mild regurgitation.    Tricuspid Valve: There is mild regurgitation.  US Carotid Bilateral  Narrative: EXAMINATION:  US CAROTID BILATERAL    CLINICAL HISTORY:  GARTH;  personal history of coronary artery disease, unstable angina, chest pain    COMPARISON:  MRA neck 01/11/2018    FINDINGS:  Color Doppler, spectral Doppler, and grayscale analysis was performed.    Grayscale images show moderate atherosclerotic plaque in bilateral carotid bulbs extending into proximal internal carotid arteries bilaterally.  Mild atherosclerotic plaque is also occurs in bilateral external carotid arteries.    Right internal carotid artery estimated peak systolic velocity is 128 cm/sec.    Antegrade flow in right vertebral artery.    Left internal carotid artery estimated peak systolic velocity is 333 cm/sec.    Antegrade flow in left vertebral artery.  Impression: 1. Greater than 70% left ICA stenosis.  2. 50-69% right ICA stenosis.  This report was flagged in Epic as abnormal.    Electronically signed by: Max Adam  Date:    07/05/2024  Time:    13:51  CTA Chest Aorta Non Coronary  Narrative: CMS MANDATED QUALITY DATA - CT RADIATION - 436    All CT scans at this facility utilize dose modulation, iterative reconstruction, and/or weight based dosing when appropriate to reduce radiation dose to as low " as reasonably achievable.    EXAMINATION:  CTA CHEST AORTA NON CORONARY    CLINICAL HISTORY:  Aortic dissection suspected;    TECHNIQUE:  CT angiography of the chest with 100 mL Omnipaque 350. Maximum intensity projection coronal reformations were created at a separate workstation and stored in the patient's permanent medical record.    COMPARISON:  None    FINDINGS:  No pulmonary embolism identified.  The thoracic aorta is normal caliber with mild calcified plaque formation.  Heart size is normal.  No enlarged mediastinal lymph nodes or mass.    The central tracheobronchial tree is patent.  There is bilateral dependent subsegmental atelectasis of the lungs.  There are geographic ground-glass opacities of the bilateral lungs, likely secondary to low inspiratory effort.    The visualized abdominal viscera are unremarkable.  There are degenerative changes of the spine with no acute osseous abnormality observed.  Median sternotomy wires noted.  Impression: 1. No pulmonary embolism identified.  2. Subsegmental atelectasis of the bilateral lungs as described.    Electronically signed by: Ernie Rubin  Date:    07/05/2024  Time:    11:20  X-Ray Chest AP Portable  Narrative: EXAMINATION:  XR CHEST AP PORTABLE    CLINICAL HISTORY:  Chest Pain; and weakness    FINDINGS:  Portable chest radiograph at 08:14 hours compared to prior exams shows median sternotomy wires and mediastinal surgical clips, with the cardiomediastinal silhouette and pulmonary vasculature within normal limits.    The lungs are normally expanded, with no consolidation, large pleural effusion, or evidence of pulmonary edema. No pneumothorax. There are no significant osseous abnormalities.  Impression: No evidence of active cardiopulmonary disease.    Electronically signed by: Jose E Cantu  Date:    07/05/2024  Time:    08:28      [facility]  Results for orders placed during the hospital encounter of 07/05/24    Echo    Interpretation Summary    Left  Ventricle: The left ventricle is normal in size. Normal wall thickness. There is normal systolic function with a visually estimated ejection fraction of 60 - 65%. There is normal diastolic function. E/A ratio is 0.98.    Right Ventricle: Normal right ventricular cavity size. Systolic function is normal.    Mitral Valve: There is mild regurgitation.    Tricuspid Valve: There is mild regurgitation.      CURRENT/PREVIOUS VISIT EKG  Results for orders placed or performed during the hospital encounter of 07/05/24   EKG 12-lead    Collection Time: 07/06/24  7:28 AM   Result Value Ref Range    QRS Duration 94 ms    [facility] QTC Calculation 435 ms    Narrative    Test Reason : I21.4,    Vent. Rate : 063 BPM     Atrial Rate : 063 BPM     P-R Int : 198 ms          QRS Dur : 094 ms      QT Int : 426 ms       P-R-T Axes : 046 029 088 degrees     QTc Int : 435 ms    Normal sinus rhythm  Nonspecific ST abnormality  Abnormal ECG  When compared with ECG of 05-JUL-2024 08:11,  No significant change was found    Referred By: KATHRINE KENDALL           Confirmed By:        ASSESSMENT/PLAN:     Active Hospital Problems    Diagnosis    *Unstable angina    BMI 33.0-33.9,adult    Back pain    Bilateral carotid artery stenosis    Essential hypertension       ASSESSMENT & PLAN:   NSTEMI he is pain-free at present.  Unfortunately he received contrast for the chest CT will hydrate him and perform a diagnostic coronary arteriogram in the next 48-72 hours if he does not have recurrent chest pains.  And his renal function remained stable     Coronary artery disease with previous coronary artery bypass and percutaneous revascularization.     Essential hypertension     Hypercholesterolemia on statin fish oil and ezetimibe      RECOMMENDATIONS:  Coronary arteriogram on Monday morning        Dhruv Barrett MD  Date of Service: 07/06/2024  1:03 PM

## 2024-07-07 LAB
APTT PPP: 45.6 SEC (ref 21–32)
OHS QRS DURATION: 90 MS
OHS QRS DURATION: 94 MS
OHS QTC CALCULATION: 413 MS
OHS QTC CALCULATION: 416 MS

## 2024-07-07 PROCEDURE — 36415 COLL VENOUS BLD VENIPUNCTURE: CPT | Performed by: INTERNAL MEDICINE

## 2024-07-07 PROCEDURE — 93005 ELECTROCARDIOGRAM TRACING: CPT | Performed by: GENERAL PRACTICE

## 2024-07-07 PROCEDURE — 96366 THER/PROPH/DIAG IV INF ADDON: CPT

## 2024-07-07 PROCEDURE — 85730 THROMBOPLASTIN TIME PARTIAL: CPT | Performed by: INTERNAL MEDICINE

## 2024-07-07 PROCEDURE — 25000003 PHARM REV CODE 250: Performed by: NURSE PRACTITIONER

## 2024-07-07 PROCEDURE — 94761 N-INVAS EAR/PLS OXIMETRY MLT: CPT

## 2024-07-07 PROCEDURE — 25000003 PHARM REV CODE 250: Performed by: INTERNAL MEDICINE

## 2024-07-07 PROCEDURE — 12000002 HC ACUTE/MED SURGE SEMI-PRIVATE ROOM

## 2024-07-07 PROCEDURE — 99900031 HC PATIENT EDUCATION (STAT)

## 2024-07-07 PROCEDURE — 99233 SBSQ HOSP IP/OBS HIGH 50: CPT | Mod: ,,, | Performed by: INTERNAL MEDICINE

## 2024-07-07 PROCEDURE — 63600175 PHARM REV CODE 636 W HCPCS: Performed by: NURSE PRACTITIONER

## 2024-07-07 PROCEDURE — 93010 ELECTROCARDIOGRAM REPORT: CPT | Mod: ,,, | Performed by: GENERAL PRACTICE

## 2024-07-07 RX ORDER — SODIUM CHLORIDE 450 MG/100ML
INJECTION, SOLUTION INTRAVENOUS CONTINUOUS
Status: CANCELLED | OUTPATIENT
Start: 2024-07-08 | End: 2024-07-09

## 2024-07-07 RX ORDER — DIPHENHYDRAMINE HCL 25 MG
50 CAPSULE ORAL
Status: CANCELLED | OUTPATIENT
Start: 2024-07-07

## 2024-07-07 RX ADMIN — LEVOTHYROXINE SODIUM 125 MCG: 0.03 TABLET ORAL at 05:07

## 2024-07-07 RX ADMIN — ASPIRIN 81 MG 81 MG: 81 TABLET ORAL at 09:07

## 2024-07-07 RX ADMIN — RANOLAZINE 500 MG: 500 TABLET, FILM COATED, EXTENDED RELEASE ORAL at 09:07

## 2024-07-07 RX ADMIN — Medication 9 MG: at 08:07

## 2024-07-07 RX ADMIN — AMLODIPINE BESYLATE 5 MG: 5 TABLET ORAL at 08:07

## 2024-07-07 RX ADMIN — ATORVASTATIN CALCIUM 40 MG: 40 TABLET, FILM COATED ORAL at 08:07

## 2024-07-07 RX ADMIN — EZETIMIBE 10 MG: 10 TABLET ORAL at 09:07

## 2024-07-07 RX ADMIN — CLOPIDOGREL BISULFATE 75 MG: 75 TABLET, FILM COATED ORAL at 09:07

## 2024-07-07 RX ADMIN — LOSARTAN POTASSIUM 100 MG: 50 TABLET, FILM COATED ORAL at 09:07

## 2024-07-07 RX ADMIN — HEPARIN SODIUM 14 UNITS/KG/HR: 10000 INJECTION, SOLUTION INTRAVENOUS at 02:07

## 2024-07-07 RX ADMIN — RANOLAZINE 500 MG: 500 TABLET, FILM COATED, EXTENDED RELEASE ORAL at 08:07

## 2024-07-07 RX ADMIN — AMLODIPINE BESYLATE 5 MG: 5 TABLET ORAL at 09:07

## 2024-07-07 RX ADMIN — HEPARIN SODIUM 14 UNITS/KG/HR: 10000 INJECTION, SOLUTION INTRAVENOUS at 11:07

## 2024-07-07 NOTE — SUBJECTIVE & OBJECTIVE
Interval History: Patient was seen and examined. No issues, remains chest pain free, on heparin infusion, awaiting cath on Monday.     Review of Systems   Constitutional:  Positive for activity change. Negative for appetite change and diaphoresis.   HENT: Negative.     Respiratory:  Positive for shortness of breath. Negative for cough, chest tightness and wheezing.    Cardiovascular:  Negative for chest pain and palpitations.   Gastrointestinal:  Positive for nausea. Negative for constipation, diarrhea and vomiting.   Genitourinary: Negative.    Neurological: Negative.    Psychiatric/Behavioral: Negative.       Objective:     Vital Signs (Most Recent):  Temp: 97.5 °F (36.4 °C) (07/07/24 1127)  Pulse: 70 (07/07/24 1127)  Resp: 16 (07/07/24 1127)  BP: (!) 143/84 (07/07/24 1127)  SpO2: 97 % (07/07/24 1127) Vital Signs (24h Range):  Temp:  [97.4 °F (36.3 °C)-97.8 °F (36.6 °C)] 97.5 °F (36.4 °C)  Pulse:  [59-77] 70  Resp:  [16-19] 16  SpO2:  [95 %-97 %] 97 %  BP: (119-157)/(61-99) 143/84     Weight: 108.5 kg (239 lb 3.2 oz)  Body mass index is 34.32 kg/m².    Intake/Output Summary (Last 24 hours) at 7/7/2024 1302  Last data filed at 7/7/2024 1245  Gross per 24 hour   Intake 600 ml   Output --   Net 600 ml         Physical Exam  Vitals reviewed.   Constitutional:       General: He is not in acute distress.     Appearance: Normal appearance. He is obese. He is not ill-appearing.   HENT:      Head: Normocephalic and atraumatic.      Mouth/Throat:      Mouth: Mucous membranes are moist.   Eyes:      Extraocular Movements: Extraocular movements intact.      Pupils: Pupils are equal, round, and reactive to light.   Cardiovascular:      Rate and Rhythm: Normal rate and regular rhythm.      Heart sounds: No murmur heard.  Pulmonary:      Effort: Pulmonary effort is normal.   Abdominal:      General: Bowel sounds are normal.      Palpations: Abdomen is soft.      Tenderness: There is no abdominal tenderness.   Musculoskeletal:          General: No swelling. Normal range of motion.      Cervical back: Neck supple.   Skin:     General: Skin is warm and dry.   Neurological:      General: No focal deficit present.      Mental Status: He is alert. Mental status is at baseline.   Psychiatric:         Mood and Affect: Mood normal.             Significant Labs: All pertinent labs within the past 24 hours have been reviewed.  BMP:   Recent Labs   Lab 07/06/24  0419         K 4.1      CO2 25   BUN 16   CREATININE 1.1   CALCIUM 8.7   MG 1.9     CBC:   Recent Labs   Lab 07/06/24  0419   WBC 9.28   HGB 13.7*   HCT 43.2          Significant Imaging: I have reviewed all pertinent imaging results/findings within the past 24 hours.

## 2024-07-07 NOTE — CARE UPDATE
07/07/24 0721   Patient Assessment/Suction   Level of Consciousness (AVPU) alert   Respiratory Effort Normal;Unlabored   Expansion/Accessory Muscles/Retractions no use of accessory muscles   All Lung Fields Breath Sounds clear   Rhythm/Pattern, Respiratory unlabored;depth regular   Cough Frequency no cough   PRE-TX-O2   Device (Oxygen Therapy) room air   SpO2 95 %   Pulse Oximetry Type Intermittent   $ Pulse Oximetry - Multiple Charge Pulse Oximetry - Multiple   Pulse 67   Resp 16   Temp 97.4 °F (36.3 °C)   BP (!) 146/87   Preset CPAP/BiPAP Settings   Mode Of Delivery   (FAMILY WILL BRING HOME CPAP)   Education   $ Education 15 min;Oxygen

## 2024-07-07 NOTE — PROGRESS NOTES
[facility]  Department of Cardiology  Progress Note    PATIENT NAME: Preston Arzate  MRN: [id number]  TODAY'S DATE: 07/07/2024  ADMIT DATE: 7/5/2024    SUBJECTIVE     PRINCIPLE PROBLEM: Unstable angina    INTERVAL HISTORY:    7/7/2024  He denies any chest pains any shortness of breath.  Scheduled for coronary arteriogram tomorrow the procedure risks and benefits have been explained and he is in agreement.  He also has carotid stenosis most likely a CTA will be performed as an outpatient.    7/6/2024  Denies any symptoms of chest pain.  Renal function is improving.    HPI  He woke up this morning with severe back pain associated with nausea sweating diaphoresis and shortness of breath. He took a sublingually nitroglycerin with relief. He had coronary artery bypass performed at [facility] by Dr. Montes. He has had 5 stents since his bypass. The last 1 was performed by Dr. Franco at [facility] a proximally 5 years ago. The patient is totally asymptomatic at present unfortunately he had a CTA of the chest. Will go ahead and hydrate him well and follow his renal function. He will need a diagnostic coronary arteriogram in the near future depending upon his renal function.     Review of patient's allergies indicates:   Allergen Reactions    Carvedilol      Pedal edema    Imdur [isosorbide mononitrate]      headaches       REVIEW OF SYSTEMS  CONSTITUTIONAL: Negative for chills, fatigue and fever.   EYES: No double vision, No blurred vision  NEURO: No headaches, No dizziness  RESPIRATORY ; shortness of breath  CARDIOVASCULAR:  Back pain associated with nausea  and diaphoresis  GI: Negative for abdominal pain, No melena, diarrhea, positive for nausea  : Negative for dysuria and frequency, Negative for hematuria  SKIN: Negative for bruising, Negative for edema or discoloration noted.     OBJECTIVE     VITAL SIGNS (Most Recent)  Temp: 97.5 °F (36.4 °C) (07/07/24 1127)  Pulse: 70  (07/07/24 1127)  Resp: 16 (07/07/24 1127)  BP: (!) 143/84 (07/07/24 1127)  SpO2: 97 % (07/07/24 1127)    VENTILATION STATUS  Resp: 16 (07/07/24 1127)  SpO2: 97 % (07/07/24 1127)           I & O (Last 24H):  Intake/Output Summary (Last 24 hours) at 7/7/2024 1151  Last data filed at 7/7/2024 0843  Gross per 24 hour   Intake 360 ml   Output --   Net 360 ml       WEIGHTS  Wt Readings from Last 1 Encounters:   07/05/24 1530 108.5 kg (239 lb 3.2 oz)   07/05/24 0804 104.3 kg (230 lb)       PHYSICAL EXAM  GENERAL: well built, well nourished, well-developed in no apparent distress alert and oriented.  Overweight  HEENT: Normocephalic. Pupils normal and conjunctivae normal.  Mucous membranes normal, no cyanosis or icterus, trachea central,no pallor or icterus is noted..   NECK: No JVD. No bruit..   THYROID: Thyroid not enlarged. No nodules present..   CARDIAC: Regular rate and rhythm. S1 is normal.S2 is normal.No gallops, clicks or murmurs noted at this time.  CHEST ANATOMY: normal.   LUNGS: Clear to auscultation. No wheezing or rhonchi..   ABDOMEN: Soft no masses or organomegaly.  No abdomen pulsations or bruits.  Normal bowel sounds. No pulsations and no masses felt, No guarding or rebound.   URINARY: No nelson catheter   EXTREMITIES: No cyanosis, clubbing or edema noted at this time., no calf tenderness bilaterally.   PERIPHERAL VASCULAR SYSTEM: Good palpable distal pulses.   CENTRAL NERVOUS SYSTEM: No focal motor or sensory deficits noted.   SKIN: Skin without lesions, moist, well perfused.   MUSCLE STRENGTH & TONE: No noteable weakness, atrophy or abnormal movement.      SCHEDULED MEDS:   amLODIPine  5 mg Oral BID    aspirin  81 mg Oral Daily    atorvastatin  40 mg Oral QHS    clopidogreL  75 mg Oral Daily    ezetimibe  10 mg Oral Daily    levothyroxine  125 mcg Oral Before breakfast    losartan  100 mg Oral Daily    ranolazine  500 mg Oral BID       CONTINUOUS INFUSIONS:   heparin (porcine) in D5W  0-40 Units/kg/hr  (Adjusted) Intravenous Continuous 12 mL/hr at 07/07/24 0222 14 Units/kg/hr at 07/07/24 0222       PRN MEDS:  Current Facility-Administered Medications:     acetaminophen, 650 mg, Oral, Q4H PRN    heparin (PORCINE), 46.8 Units/kg (Adjusted), Intravenous, PRN    heparin (PORCINE), 30 Units/kg (Adjusted), Intravenous, PRN    magnesium oxide, 800 mg, Oral, PRN    magnesium oxide, 800 mg, Oral, PRN    melatonin, 9 mg, Oral, Nightly PRN    naloxone, 0.02 mg, Intravenous, PRN    nitroGLYCERIN, 0.4 mg, Sublingual, Q5 Min PRN    ondansetron, 4 mg, Intravenous, Q6H PRN    potassium bicarbonate, 35 mEq, Oral, PRN    potassium bicarbonate, 50 mEq, Oral, PRN    potassium bicarbonate, 60 mEq, Oral, PRN    potassium, sodium phosphates, 2 packet, Oral, PRN    potassium, sodium phosphates, 2 packet, Oral, PRN    potassium, sodium phosphates, 2 packet, Oral, PRN    senna-docusate 8.6-50 mg, 1 tablet, Oral, BID PRN    sodium chloride 0.9%, 10 mL, Intravenous, PRN    sodium chloride 0.9%, 3 mL, Intravenous, Q12H PRN    LABS AND DIAGNOSTICS     CBC LAST 3 DAYS  Recent Labs   Lab 07/05/24  0857 07/06/24  0419   WBC 7.49 9.28   RBC 4.75 4.71   HGB 14.0 13.7*   HCT 44.1 43.2   MCV 93 92   MCH 29.5 29.1   MCHC 31.7* 31.7*   RDW 15.0* 14.9*    225   MPV 10.2 10.2   GRAN 62.6  4.7 60.5  5.6   LYMPH 23.6  1.8 27.0  2.5   MONO 10.0  0.8 9.3  0.9   BASO 0.08 0.05   NRBC 0 0       COAGULATION LAST 3 DAYS  Recent Labs   Lab 07/05/24  1351 07/05/24  2215 07/06/24  0441 07/06/24  1136 07/07/24  0528   INR 1.0  --   --   --   --    APTT 25.4   < > 49.0* 42.4* 45.6*    < > = values in this interval not displayed.       CHEMISTRY LAST 3 DAYS  Recent Labs   Lab 07/05/24  0857 07/06/24  0419    139   K 4.3 4.1    107   CO2 25 25   ANIONGAP 7* 7*   BUN 17 16   CREATININE 1.3 1.1    108   CALCIUM 9.0 8.7   PHOS  --  3.3   MG 2.0 1.9   ALBUMIN 4.0 3.7   BILITOT 0.4 0.4   PROT 7.4 6.9   ALKPHOS 66 65   ALT 40 37   AST 24 20  "      CARDIAC PROFILE LAST 3 DAYS  Recent Labs   Lab 07/05/24  0857 07/05/24  1213 07/05/24  1543 07/05/24  2215 07/06/24  0821   BNP 14  --   --   --   --    TROPONINIHS 37.5*   < > 372.8* 202.2* 93.4*    < > = values in this interval not displayed.       ENDOCRINE LAST 3 DAYS  No results for input(s): "TSH", "PROCAL" in the last 168 hours.    LAST ARTERIAL BLOOD GAS  ABG  No results for input(s): "PH", "PO2", "PCO2", "HCO3", "BE" in the last 168 hours.    LAST 7 DAYS MICROBIOLOGY   Microbiology Results (last 7 days)       ** No results found for the last 168 hours. **            MOST RECENT IMAGING  Echo    Left Ventricle: The left ventricle is normal in size. Normal wall   thickness. There is normal systolic function with a visually estimated   ejection fraction of 60 - 65%. There is normal diastolic function. E/A   ratio is 0.98.    Right Ventricle: Normal right ventricular cavity size. Systolic   function is normal.    Mitral Valve: There is mild regurgitation.    Tricuspid Valve: There is mild regurgitation.  US Carotid Bilateral  Narrative: EXAMINATION:  US CAROTID BILATERAL    CLINICAL HISTORY:  GARTH;  personal history of coronary artery disease, unstable angina, chest pain    COMPARISON:  MRA neck 01/11/2018    FINDINGS:  Color Doppler, spectral Doppler, and grayscale analysis was performed.    Grayscale images show moderate atherosclerotic plaque in bilateral carotid bulbs extending into proximal internal carotid arteries bilaterally.  Mild atherosclerotic plaque is also occurs in bilateral external carotid arteries.    Right internal carotid artery estimated peak systolic velocity is 128 cm/sec.    Antegrade flow in right vertebral artery.    Left internal carotid artery estimated peak systolic velocity is 333 cm/sec.    Antegrade flow in left vertebral artery.  Impression: 1. Greater than 70% left ICA stenosis.  2. 50-69% right ICA stenosis.  This report was flagged in Epic as abnormal.    Electronically " signed by: Max Adam  Date:    07/05/2024  Time:    13:51  CTA Chest Aorta Non Coronary  Narrative: CMS MANDATED QUALITY DATA - CT RADIATION - 436    All CT scans at this facility utilize dose modulation, iterative reconstruction, and/or weight based dosing when appropriate to reduce radiation dose to as low as reasonably achievable.    EXAMINATION:  CTA CHEST AORTA NON CORONARY    CLINICAL HISTORY:  Aortic dissection suspected;    TECHNIQUE:  CT angiography of the chest with 100 mL Omnipaque 350. Maximum intensity projection coronal reformations were created at a separate workstation and stored in the patient's permanent medical record.    COMPARISON:  None    FINDINGS:  No pulmonary embolism identified.  The thoracic aorta is normal caliber with mild calcified plaque formation.  Heart size is normal.  No enlarged mediastinal lymph nodes or mass.    The central tracheobronchial tree is patent.  There is bilateral dependent subsegmental atelectasis of the lungs.  There are geographic ground-glass opacities of the bilateral lungs, likely secondary to low inspiratory effort.    The visualized abdominal viscera are unremarkable.  There are degenerative changes of the spine with no acute osseous abnormality observed.  Median sternotomy wires noted.  Impression: 1. No pulmonary embolism identified.  2. Subsegmental atelectasis of the bilateral lungs as described.    Electronically signed by: Ernie Rubin  Date:    07/05/2024  Time:    11:20  X-Ray Chest AP Portable  Narrative: EXAMINATION:  XR CHEST AP PORTABLE    CLINICAL HISTORY:  Chest Pain; and weakness    FINDINGS:  Portable chest radiograph at 08:14 hours compared to prior exams shows median sternotomy wires and mediastinal surgical clips, with the cardiomediastinal silhouette and pulmonary vasculature within normal limits.    The lungs are normally expanded, with no consolidation, large pleural effusion, or evidence of pulmonary edema. No pneumothorax. There  are no significant osseous abnormalities.  Impression: No evidence of active cardiopulmonary disease.    Electronically signed by: Jose E Cantu  Date:    07/05/2024  Time:    08:28      [facility]  Results for orders placed during the hospital encounter of 07/05/24    Echo    Interpretation Summary    Left Ventricle: The left ventricle is normal in size. Normal wall thickness. There is normal systolic function with a visually estimated ejection fraction of 60 - 65%. There is normal diastolic function. E/A ratio is 0.98.    Right Ventricle: Normal right ventricular cavity size. Systolic function is normal.    Mitral Valve: There is mild regurgitation.    Tricuspid Valve: There is mild regurgitation.      CURRENT/PREVIOUS VISIT EKG  Results for orders placed or performed during the hospital encounter of 07/05/24   EKG 12-lead    Collection Time: 07/07/24  7:42 AM   Result Value Ref Range    QRS Duration 90 ms    [facility] QTC Calculation 413 ms    Narrative    Test Reason : I21.4,    Vent. Rate : 066 BPM     Atrial Rate : 066 BPM     P-R Int : 204 ms          QRS Dur : 090 ms      QT Int : 394 ms       P-R-T Axes : 016 020 099 degrees     QTc Int : 413 ms    Normal sinus rhythm  ST and T wave abnormality, consider anterior ischemia  Abnormal ECG  When compared with ECG of 06-JUL-2024 07:28,  No significant change was found    Referred By: KATHRINE KENDALL           Confirmed By:        ASSESSMENT/PLAN:     Active Hospital Problems    Diagnosis    *Unstable angina    BMI 33.0-33.9,adult    Back pain    Bilateral carotid artery stenosis    Essential hypertension       ASSESSMENT & PLAN:   NSTEMI he is pain-free at present.  For coronary arteriogram in the morning the procedure risks and benefits have been explained and he is in agreement to proceed.     Coronary artery disease with previous coronary artery bypass and percutaneous revascularization.     Essential hypertension     Hypercholesterolemia on statin fish oil and  ezetimibe    Carotid stenosis will need an outpatient CTA    RECOMMENDATIONS:  Coronary arteriogram in saphenous vein graft injection tomorrow morning the risks benefits and indications have been explained the patient is in agreement to proceed        Dhruv Barrett MD  Date of Service: 07/07/2024  1:03 PM

## 2024-07-07 NOTE — PROGRESS NOTES
[facility] Medicine  Progress Note    Patient Name: Preston Arzate  MRN: [id number]  Patient Class: IP- Inpatient   Admission Date: 7/5/2024  Length of Stay: 1 days  Attending Physician: Kahlil Martinez MD  Primary Care Provider: Matthew Brown IV, MD        Subjective:     Principal Problem:Unstable angina        HPI:  69-year-old male with a past medical history of coronary artery disease status post stent placement LM, SVG to PDA 2015on Plavix and aspirin, status post CABG x 2 2005 (LIMA to LAD, SVG to PDA), Enlarged ascending aorta (4.4 cm), hypertension, hyperlipidemia, bilateral carotid stenosis (20-39% CHARLENE, 30-49% LICA), sleep apnea compliant with CPAP at night, CKD stage 3 presents to the emergency room with reports of mild back discomfort with associated nausea and diaphoresis also reports having some shortness for breath on and off for the last couple of weeks.  Patient denies chest pain.  Patient reports back pain improved with aspirin and nitro today.  Patient reports symptoms are worse after he eats and while at rest.  In the ER, troponin I high sensitivity 37.5, BNP 14, CBC CMP unremarkable, EKG with nonspecific ST and T-wave abnormality chest x-ray nonacute, CTA chest with no evidence of pulmonary embolus, Subsegmental atelectasis of the bilateral lungs.  Admit to hospital medicine due to mildly elevated troponin history of aortic aneurysm, moderate heart risk with heart score 6. Trend serial troponin.       Overview/Hospital Course:  No notes on file    Interval History: Patient was seen and examined. No issues, remains chest pain free, on heparin infusion, awaiting cath on Monday.     Review of Systems   Constitutional:  Positive for activity change. Negative for appetite change and diaphoresis.   HENT: Negative.     Respiratory:  Positive for shortness of breath. Negative for cough, chest tightness and wheezing.    Cardiovascular:  Negative for chest pain and  palpitations.   Gastrointestinal:  Positive for nausea. Negative for constipation, diarrhea and vomiting.   Genitourinary: Negative.    Neurological: Negative.    Psychiatric/Behavioral: Negative.       Objective:     Vital Signs (Most Recent):  Temp: 97.5 °F (36.4 °C) (07/07/24 1127)  Pulse: 70 (07/07/24 1127)  Resp: 16 (07/07/24 1127)  BP: (!) 143/84 (07/07/24 1127)  SpO2: 97 % (07/07/24 1127) Vital Signs (24h Range):  Temp:  [97.4 °F (36.3 °C)-97.8 °F (36.6 °C)] 97.5 °F (36.4 °C)  Pulse:  [59-77] 70  Resp:  [16-19] 16  SpO2:  [95 %-97 %] 97 %  BP: (119-157)/(61-99) 143/84     Weight: 108.5 kg (239 lb 3.2 oz)  Body mass index is 34.32 kg/m².    Intake/Output Summary (Last 24 hours) at 7/7/2024 1302  Last data filed at 7/7/2024 1245  Gross per 24 hour   Intake 600 ml   Output --   Net 600 ml         Physical Exam  Vitals reviewed.   Constitutional:       General: He is not in acute distress.     Appearance: Normal appearance. He is obese. He is not ill-appearing.   HENT:      Head: Normocephalic and atraumatic.      Mouth/Throat:      Mouth: Mucous membranes are moist.   Eyes:      Extraocular Movements: Extraocular movements intact.      Pupils: Pupils are equal, round, and reactive to light.   Cardiovascular:      Rate and Rhythm: Normal rate and regular rhythm.      Heart sounds: No murmur heard.  Pulmonary:      Effort: Pulmonary effort is normal.   Abdominal:      General: Bowel sounds are normal.      Palpations: Abdomen is soft.      Tenderness: There is no abdominal tenderness.   Musculoskeletal:         General: No swelling. Normal range of motion.      Cervical back: Neck supple.   Skin:     General: Skin is warm and dry.   Neurological:      General: No focal deficit present.      Mental Status: He is alert. Mental status is at baseline.   Psychiatric:         Mood and Affect: Mood normal.             Significant Labs: All pertinent labs within the past 24 hours have been reviewed.  BMP:   Recent Labs    Lab 07/06/24  0419         K 4.1      CO2 25   BUN 16   CREATININE 1.1   CALCIUM 8.7   MG 1.9     CBC:   Recent Labs   Lab 07/06/24  0419   WBC 9.28   HGB 13.7*   HCT 43.2          Significant Imaging: I have reviewed all pertinent imaging results/findings within the past 24 hours.    Assessment/Plan:      * Unstable angina  Heart score 6  Hx of Cad, stents, cabg, htn, MI, bilateral carotid stenosis, ckd, increased cholesterol, obesity  Patient with back discomfort with associated nausea and diaphoresis, occasional shortness of breath. Relieved with aspirin and nitro  acute, trend troponin, ASA, telemetry,   Carotid US ordered  Patient is scheduled for PET scan stress test on 7/19/24 - Dr. Gutierres   Reports took aspirin and plavix today  Continue ranexa    Chart review:  Echocardiograms:   TTE 11/8/2022  The left ventricle is normal in size with concentric remodeling and normal systolic function.  The estimated ejection fraction is 65%.  Normal left ventricular diastolic function.  The ascending aorta is mildly dilated (4.16 cm)  Normal right ventricular size with normal right ventricular systolic function.  Mild left atrial enlargement.  Normal central venous pressure (3 mmHg).  The estimated PA systolic pressure is 19 mmHg.  IVC clearly collapsing, suspect non-cardiac cause of peripheral edema     TTE 2/11/2022  The left ventricle is normal in size with moderate concentric hypertrophy and normal systolic function.  The estimated ejection fraction is 55%.  Normal left ventricular diastolic function.  Normal right ventricular size with normal right ventricular systolic function.  Normal central venous pressure (3 mmHg).  The estimated PA systolic pressure is 18 mmHg.    TTE 6/5/2020  Normal left ventricular systolic function. The estimated ejection fraction is 55%.  Normal LV diastolic function.  No wall motion abnormalities.  Normal right ventricular systolic function.  Mild right  ventricular enlargement.  Mild left atrial enlargement.  Intermediate central venous pressure (8 mmHg).  The aortic root is mildly dilated.  The study quality was technically difficult. The study was difficult due to patient's body habitus.    Stress Tests:   PET Stress Test 6/22/2020    There is a very small sized, mild intensity mid inferior reversible perfusion abnormality in the distribution of thePDA indicative of focal coronary stenosis.    Within the perfusion abnormality absolute myocardial perfusion (cc/min/gm) averaged 0.60 cc/min/g at rest, 0.88 cc/min/g at stress and CFR was 1.46 cc/min/g, which equates to mildly reduced coronary flow capacity (Green) in 6% of the myocardium (within the PDA territory) .    Whole heart absolute myocardial perfusion (cc/min/g) averaged 0.75 cc/min/g at rest, 1.40 cc/min/g at stress, and 1.88  CFR.    Gated perfusion images showed an ejection fraction of 74% at rest and 75% during stress. Normal ejection fraction is greater than 51%.    Wall motion was normal at rest and during stress.    LV cavity size is normal at rest and stress.    The EKG portion of this study is abnormal but not diagnostic.    There were no arrhythmias during stress.    The patient reported no chest pain during the stress test.    When compared to the prior study on 5/14/2018, absolute stress flow and coronary flow capacity has improved in all territories.  These findings are most consistent with either physiologic improvement of CAD or likely caffeine effect on the prior study.  Overall, the perfusion patterns are visually very similar.     PET Stress test 5/14/2018  CONCLUSIONS: ABNORMAL MYOCARDIAL PERFUSION PET STRESS TEST   1. There is a very small sized mild ischemia in the mid inferoseptal wall in the usual distribution of the Posterior Descending Artery. This defect comprises 5 % of the left ventricular myocardium.   2. There is a very small sized mild ischemia in the basilar lateral wall in  the usual distribution of the Left Circumflex Artery. This defect comprises 5 % of the left ventricular myocardium.   3. Resting wall motion is physiologic. Stress wall motion is physiologic.   4. LV function is normal at rest and stress.  (normal is >= 51%)   5. The ventricular volumes are normal at rest and stress.   6. The extracardiac distribution of radioactivity is normal.   7. When compared to the previous study from 12/08/2015, the lateral wall has significantly improved.  There is also a very small region of ischemia in the PDA territory.      Caths:   [facility] 12/9/2015  1. LVEDP=15mmHg   2. LVEF=65%   3. Patent LIMA to LAD   4. Patent SVG to D1   5. Occluded SVG to OM1   6. Tandem 95% and 75% stenoses of SVG to PDA   7. 95% stenosis of distal LM   8. Small caliber diffusely diseased LCx and OM1   9. Successful PCI of SVG to PDA with GREGORIA   10. Successful PCI of distal LM with GREGORIA      Other:  Event monitor 1/11/2018  In summary this patient's monitor is consistent with sinus rhythm and a single PVC. There was no atrial fibrillation while the device was in use.      Carotid ultrasound 1/10/2018  There is 20 - 39% right Internal Carotid stenosis.   There is 40 - 49% left Internal Carotid stenosis.       Back pain  Relieved with nitro and aspirin  chest x-ray nonacute, CTA chest with no evidence of pulmonary embolus, Subsegmental atelectasis of the bilateral lungs.       BMI 33.0-33.9,adult  Body mass index is 33 kg/m². Morbid obesity complicates all aspects of disease management from diagnostic modalities to treatment. Weight loss encouraged and health benefits explained to patient.       Bilateral carotid artery stenosis  Carotid ultrasound ordered  Continue aspirin Plavix      Essential hypertension  Chronic, controlled. Latest blood pressure and vitals reviewed-     Temp:  [98 °F (36.7 °C)]   Pulse:  [56-74]   Resp:  [13-19]   BP: (120-133)/(68-80)   SpO2:  [94 %-97 %] .   Home meds for hypertension were  reviewed and noted below.   Hypertension Medications               amLODIPine (NORVASC) 5 MG tablet Take 1 tablet (5 mg total) by mouth once daily.    losartan (COZAAR) 100 MG tablet TAKE 1 TABLET(100 MG) BY MOUTH EVERY DAY            While in the hospital, will manage blood pressure as follows; Continue home antihypertensive regimen    Will utilize p.r.n. blood pressure medication only if patient's blood pressure greater than 180/110 and he develops symptoms such as worsening chest pain or shortness of breath.      VTE Risk Mitigation (From admission, onward)           Ordered     heparin 25,000 units in dextrose 5% (100 units/ml) IV bolus from bag LOW INTENSITY nomogram - [facility]  As needed (PRN)        Question:  Heparin Infusion Adjustment (DO NOT MODIFY ANSWER)  Answer:  [email or web address]\[email or web address]\Images\Pharmacy\HeparinInfusions\heparin LOW INTENSITY nomogram for [facility] [id number]    07/05/24 1336     heparin 25,000 units in dextrose 5% (100 units/ml) IV bolus from bag LOW INTENSITY nomogram - [facility]  As needed (PRN)        Question:  Heparin Infusion Adjustment (DO NOT MODIFY ANSWER)  Answer:  [email or web address]\epic\Images\Pharmacy\HeparinInfusions\heparin LOW INTENSITY nomogram for [facility] [id number]    07/05/24 1336     heparin 25,000 units in dextrose 5% 250 mL (100 units/mL) infusion LOW INTENSITY nomogram - [facility]  Continuous        Question:  Begin at (units/kg/hr)  Answer:  12    07/05/24 1336     Place sequential compression device  Until discontinued         07/05/24 1252     IP VTE HIGH RISK PATIENT  Once         07/05/24 1232                    Discharge Planning   IFEOMA:      Code Status: Full Code   Is the patient medically ready for discharge?:     Reason for patient still in hospital (select all that apply): Patient trending condition  Discharge Plan A: Home with family                  Kahlil Martinez MD  Department of Hospital Medicine   Critical access hospital

## 2024-07-08 VITALS
BODY MASS INDEX: 34.24 KG/M2 | HEIGHT: 70 IN | TEMPERATURE: 98 F | DIASTOLIC BLOOD PRESSURE: 69 MMHG | WEIGHT: 239.19 LBS | OXYGEN SATURATION: 95 % | HEART RATE: 80 BPM | RESPIRATION RATE: 17 BRPM | SYSTOLIC BLOOD PRESSURE: 92 MMHG

## 2024-07-08 PROBLEM — I20.0 UNSTABLE ANGINA: Status: RESOLVED | Noted: 2024-07-05 | Resolved: 2024-07-08

## 2024-07-08 LAB
APTT PPP: 27.3 SEC (ref 21–32)
APTT PPP: 43.3 SEC (ref 21–32)

## 2024-07-08 PROCEDURE — B2111ZZ FLUOROSCOPY OF MULTIPLE CORONARY ARTERIES USING LOW OSMOLAR CONTRAST: ICD-10-PCS | Performed by: INTERNAL MEDICINE

## 2024-07-08 PROCEDURE — B2181ZZ FLUOROSCOPY OF LEFT INTERNAL MAMMARY BYPASS GRAFT USING LOW OSMOLAR CONTRAST: ICD-10-PCS | Performed by: INTERNAL MEDICINE

## 2024-07-08 PROCEDURE — 25000003 PHARM REV CODE 250: Performed by: NURSE PRACTITIONER

## 2024-07-08 PROCEDURE — 99152 MOD SED SAME PHYS/QHP 5/>YRS: CPT | Mod: ,,, | Performed by: INTERNAL MEDICINE

## 2024-07-08 PROCEDURE — 99153 MOD SED SAME PHYS/QHP EA: CPT | Performed by: INTERNAL MEDICINE

## 2024-07-08 PROCEDURE — 93567 NJX CAR CTH SPRVLV AORTGRPHY: CPT | Mod: ,,, | Performed by: INTERNAL MEDICINE

## 2024-07-08 PROCEDURE — C1769 GUIDE WIRE: HCPCS | Performed by: INTERNAL MEDICINE

## 2024-07-08 PROCEDURE — C1760 CLOSURE DEV, VASC: HCPCS | Performed by: INTERNAL MEDICINE

## 2024-07-08 PROCEDURE — 93459 L HRT ART/GRFT ANGIO: CPT | Performed by: INTERNAL MEDICINE

## 2024-07-08 PROCEDURE — 99152 MOD SED SAME PHYS/QHP 5/>YRS: CPT | Performed by: INTERNAL MEDICINE

## 2024-07-08 PROCEDURE — C1887 CATHETER, GUIDING: HCPCS | Performed by: INTERNAL MEDICINE

## 2024-07-08 PROCEDURE — 63600175 PHARM REV CODE 636 W HCPCS: Performed by: INTERNAL MEDICINE

## 2024-07-08 PROCEDURE — 93459 L HRT ART/GRFT ANGIO: CPT | Mod: 26,,, | Performed by: INTERNAL MEDICINE

## 2024-07-08 PROCEDURE — 25000003 PHARM REV CODE 250: Performed by: INTERNAL MEDICINE

## 2024-07-08 PROCEDURE — 93567 NJX CAR CTH SPRVLV AORTGRPHY: CPT | Performed by: INTERNAL MEDICINE

## 2024-07-08 PROCEDURE — 36415 COLL VENOUS BLD VENIPUNCTURE: CPT | Performed by: INTERNAL MEDICINE

## 2024-07-08 PROCEDURE — 85730 THROMBOPLASTIN TIME PARTIAL: CPT | Performed by: INTERNAL MEDICINE

## 2024-07-08 PROCEDURE — B2131ZZ FLUOROSCOPY OF MULTIPLE CORONARY ARTERY BYPASS GRAFTS USING LOW OSMOLAR CONTRAST: ICD-10-PCS | Performed by: INTERNAL MEDICINE

## 2024-07-08 PROCEDURE — 94761 N-INVAS EAR/PLS OXIMETRY MLT: CPT

## 2024-07-08 PROCEDURE — C1894 INTRO/SHEATH, NON-LASER: HCPCS | Performed by: INTERNAL MEDICINE

## 2024-07-08 PROCEDURE — 27201423 OPTIME MED/SURG SUP & DEVICES STERILE SUPPLY: Performed by: INTERNAL MEDICINE

## 2024-07-08 RX ORDER — NITROGLYCERIN 0.4 MG/1
0.4 TABLET SUBLINGUAL EVERY 5 MIN PRN
Status: DISCONTINUED | OUTPATIENT
Start: 2024-07-08 | End: 2024-07-08 | Stop reason: HOSPADM

## 2024-07-08 RX ORDER — NITROGLYCERIN 0.4 MG/1
0.4 TABLET SUBLINGUAL EVERY 5 MIN PRN
Qty: 30 TABLET | Refills: 0 | Status: SHIPPED | OUTPATIENT
Start: 2024-07-08 | End: 2025-07-08

## 2024-07-08 RX ORDER — MIDAZOLAM HYDROCHLORIDE 1 MG/ML
INJECTION INTRAMUSCULAR; INTRAVENOUS
Status: DISCONTINUED | OUTPATIENT
Start: 2024-07-08 | End: 2024-07-08 | Stop reason: HOSPADM

## 2024-07-08 RX ORDER — ONDANSETRON 4 MG/1
8 TABLET, ORALLY DISINTEGRATING ORAL EVERY 8 HOURS PRN
Status: DISCONTINUED | OUTPATIENT
Start: 2024-07-08 | End: 2024-07-08 | Stop reason: HOSPADM

## 2024-07-08 RX ORDER — FENTANYL CITRATE 50 UG/ML
INJECTION, SOLUTION INTRAMUSCULAR; INTRAVENOUS
Status: DISCONTINUED | OUTPATIENT
Start: 2024-07-08 | End: 2024-07-08 | Stop reason: HOSPADM

## 2024-07-08 RX ORDER — SODIUM CHLORIDE 450 MG/100ML
INJECTION, SOLUTION INTRAVENOUS CONTINUOUS
Status: DISCONTINUED | OUTPATIENT
Start: 2024-07-08 | End: 2024-07-08 | Stop reason: HOSPADM

## 2024-07-08 RX ORDER — LIDOCAINE HYDROCHLORIDE 10 MG/ML
INJECTION, SOLUTION EPIDURAL; INFILTRATION; INTRACAUDAL; PERINEURAL
Status: DISCONTINUED | OUTPATIENT
Start: 2024-07-08 | End: 2024-07-08 | Stop reason: HOSPADM

## 2024-07-08 RX ORDER — ACETAMINOPHEN 325 MG/1
650 TABLET ORAL EVERY 4 HOURS PRN
Status: DISCONTINUED | OUTPATIENT
Start: 2024-07-08 | End: 2024-07-08 | Stop reason: HOSPADM

## 2024-07-08 RX ADMIN — LOSARTAN POTASSIUM 100 MG: 50 TABLET, FILM COATED ORAL at 09:07

## 2024-07-08 RX ADMIN — EZETIMIBE 10 MG: 10 TABLET ORAL at 09:07

## 2024-07-08 RX ADMIN — RANOLAZINE 500 MG: 500 TABLET, FILM COATED, EXTENDED RELEASE ORAL at 09:07

## 2024-07-08 RX ADMIN — ASPIRIN 81 MG 81 MG: 81 TABLET ORAL at 09:07

## 2024-07-08 RX ADMIN — AMLODIPINE BESYLATE 5 MG: 5 TABLET ORAL at 09:07

## 2024-07-08 RX ADMIN — CLOPIDOGREL BISULFATE 75 MG: 75 TABLET, FILM COATED ORAL at 09:07

## 2024-07-08 RX ADMIN — LEVOTHYROXINE SODIUM 125 MCG: 0.03 TABLET ORAL at 05:07

## 2024-07-08 NOTE — CARE UPDATE
07/07/24 2104   Patient Assessment/Suction   Level of Consciousness (AVPU) alert   Respiratory Effort Normal;Unlabored   Expansion/Accessory Muscles/Retractions no use of accessory muscles;no retractions;expansion symmetric   All Lung Fields Breath Sounds Anterior:   LUCIANA Breath Sounds clear   RUL Breath Sounds clear   Rhythm/Pattern, Respiratory unlabored;depth regular;pattern regular   Cough Frequency no cough   PRE-TX-O2   Device (Oxygen Therapy) room air   SpO2 95 %   Pulse Oximetry Type Intermittent   $ Pulse Oximetry - Multiple Charge Pulse Oximetry - Multiple   Pulse 86   Resp 16   Education   $ Education 15 min;Oxygen

## 2024-07-08 NOTE — PROGRESS NOTES
[facility] Medicine  Progress Note    Patient Name: Preston Arzate  MRN: [id number]  Patient Class: IP- Inpatient   Admission Date: 7/5/2024  Length of Stay: 2 days  Attending Physician: Kahlil Martinez MD  Primary Care Provider: Matthew Brown IV, MD        Subjective:     Principal Problem:Unstable angina        HPI:  69-year-old male with a past medical history of coronary artery disease status post stent placement LM, SVG to PDA 2015on Plavix and aspirin, status post CABG x 2 2005 (LIMA to LAD, SVG to PDA), Enlarged ascending aorta (4.4 cm), hypertension, hyperlipidemia, bilateral carotid stenosis (20-39% CHARLENE, 30-49% LICA), sleep apnea compliant with CPAP at night, CKD stage 3 presents to the emergency room with reports of mild back discomfort with associated nausea and diaphoresis also reports having some shortness for breath on and off for the last couple of weeks.  Patient denies chest pain.  Patient reports back pain improved with aspirin and nitro today.  Patient reports symptoms are worse after he eats and while at rest.  In the ER, troponin I high sensitivity 37.5, BNP 14, CBC CMP unremarkable, EKG with nonspecific ST and T-wave abnormality chest x-ray nonacute, CTA chest with no evidence of pulmonary embolus, Subsegmental atelectasis of the bilateral lungs.  Admit to hospital medicine due to mildly elevated troponin history of aortic aneurysm, moderate heart risk with heart score 6. Trend serial troponin.       Overview/Hospital Course:  No notes on file    Interval History:  Patient was examined.  No acute events overnight, blood pressure improved.  Patient is NPO and going for cardiac cath today.  Continue supportive care.    Review of Systems   Constitutional:  Positive for activity change. Negative for appetite change and diaphoresis.   HENT: Negative.     Respiratory:  Negative for cough, chest tightness, shortness of breath and wheezing.    Cardiovascular:   Negative for chest pain and palpitations.   Gastrointestinal:  Negative for constipation, diarrhea, nausea and vomiting.   Genitourinary: Negative.    Neurological: Negative.    Psychiatric/Behavioral: Negative.       Objective:     Vital Signs (Most Recent):  Temp: 98.1 °F (36.7 °C) (07/08/24 0735)  Pulse: 68 (07/08/24 1315)  Resp: 14 (07/08/24 1315)  BP: 100/65 (07/08/24 1315)  SpO2: (!) 94 % (07/08/24 1315) Vital Signs (24h Range):  Temp:  [97.5 °F (36.4 °C)-98.1 °F (36.7 °C)] 98.1 °F (36.7 °C)  Pulse:  [63-87] 68  Resp:  [11-20] 14  SpO2:  [93 %-96 %] 94 %  BP: (100-153)/(62-91) 100/65     Weight: 108.5 kg (239 lb 3.2 oz)  Body mass index is 34.32 kg/m².    Intake/Output Summary (Last 24 hours) at 7/8/2024 1604  Last data filed at 7/7/2024 2111  Gross per 24 hour   Intake 200 ml   Output --   Net 200 ml         Physical Exam  Vitals reviewed.   Constitutional:       General: He is not in acute distress.     Appearance: Normal appearance. He is obese. He is not ill-appearing.   HENT:      Head: Normocephalic and atraumatic.      Mouth/Throat:      Mouth: Mucous membranes are moist.   Eyes:      Extraocular Movements: Extraocular movements intact.      Pupils: Pupils are equal, round, and reactive to light.   Cardiovascular:      Rate and Rhythm: Normal rate and regular rhythm.      Heart sounds: No murmur heard.  Pulmonary:      Effort: Pulmonary effort is normal.   Abdominal:      General: Bowel sounds are normal.      Palpations: Abdomen is soft.      Tenderness: There is no abdominal tenderness.   Musculoskeletal:         General: No swelling. Normal range of motion.      Cervical back: Neck supple.   Skin:     General: Skin is warm and dry.   Neurological:      General: No focal deficit present.      Mental Status: He is alert. Mental status is at baseline.   Psychiatric:         Mood and Affect: Mood normal.             Significant Labs: All pertinent labs within the past 24 hours have been reviewed.  BMP:  "No results for input(s): "GLU", "NA", "K", "CL", "CO2", "BUN", "CREATININE", "CALCIUM", "MG" in the last 48 hours.  CBC: No results for input(s): "WBC", "HGB", "HCT", "PLT" in the last 48 hours.    Significant Imaging: I have reviewed all pertinent imaging results/findings within the past 24 hours.    Assessment/Plan:      * Unstable angina  Heart score 6  Hx of Cad, stents, cabg, htn, MI, bilateral carotid stenosis, ckd, increased cholesterol, obesity  Patient with back discomfort with associated nausea and diaphoresis, occasional shortness of breath. Relieved with aspirin and nitro  acute, trend troponin, ASA, telemetry,   Carotid US ordered  Patient is scheduled for PET scan stress test on 7/19/24 - Dr. Gutierres   Reports took aspirin and plavix today  Continue ranexa    Chart review:  Echocardiograms:   TTE 11/8/2022  The left ventricle is normal in size with concentric remodeling and normal systolic function.  The estimated ejection fraction is 65%.  Normal left ventricular diastolic function.  The ascending aorta is mildly dilated (4.16 cm)  Normal right ventricular size with normal right ventricular systolic function.  Mild left atrial enlargement.  Normal central venous pressure (3 mmHg).  The estimated PA systolic pressure is 19 mmHg.  IVC clearly collapsing, suspect non-cardiac cause of peripheral edema     TTE 2/11/2022  The left ventricle is normal in size with moderate concentric hypertrophy and normal systolic function.  The estimated ejection fraction is 55%.  Normal left ventricular diastolic function.  Normal right ventricular size with normal right ventricular systolic function.  Normal central venous pressure (3 mmHg).  The estimated PA systolic pressure is 18 mmHg.    TTE 6/5/2020  Normal left ventricular systolic function. The estimated ejection fraction is 55%.  Normal LV diastolic function.  No wall motion abnormalities.  Normal right ventricular systolic function.  Mild right ventricular " enlargement.  Mild left atrial enlargement.  Intermediate central venous pressure (8 mmHg).  The aortic root is mildly dilated.  The study quality was technically difficult. The study was difficult due to patient's body habitus.    Stress Tests:   PET Stress Test 6/22/2020    There is a very small sized, mild intensity mid inferior reversible perfusion abnormality in the distribution of thePDA indicative of focal coronary stenosis.    Within the perfusion abnormality absolute myocardial perfusion (cc/min/gm) averaged 0.60 cc/min/g at rest, 0.88 cc/min/g at stress and CFR was 1.46 cc/min/g, which equates to mildly reduced coronary flow capacity (Green) in 6% of the myocardium (within the PDA territory) .    Whole heart absolute myocardial perfusion (cc/min/g) averaged 0.75 cc/min/g at rest, 1.40 cc/min/g at stress, and 1.88  CFR.    Gated perfusion images showed an ejection fraction of 74% at rest and 75% during stress. Normal ejection fraction is greater than 51%.    Wall motion was normal at rest and during stress.    LV cavity size is normal at rest and stress.    The EKG portion of this study is abnormal but not diagnostic.    There were no arrhythmias during stress.    The patient reported no chest pain during the stress test.    When compared to the prior study on 5/14/2018, absolute stress flow and coronary flow capacity has improved in all territories.  These findings are most consistent with either physiologic improvement of CAD or likely caffeine effect on the prior study.  Overall, the perfusion patterns are visually very similar.     PET Stress test 5/14/2018  CONCLUSIONS: ABNORMAL MYOCARDIAL PERFUSION PET STRESS TEST   1. There is a very small sized mild ischemia in the mid inferoseptal wall in the usual distribution of the Posterior Descending Artery. This defect comprises 5 % of the left ventricular myocardium.   2. There is a very small sized mild ischemia in the basilar lateral wall in the usual  distribution of the Left Circumflex Artery. This defect comprises 5 % of the left ventricular myocardium.   3. Resting wall motion is physiologic. Stress wall motion is physiologic.   4. LV function is normal at rest and stress.  (normal is >= 51%)   5. The ventricular volumes are normal at rest and stress.   6. The extracardiac distribution of radioactivity is normal.   7. When compared to the previous study from 12/08/2015, the lateral wall has significantly improved.  There is also a very small region of ischemia in the PDA territory.      Caths:   [facility] 12/9/2015  1. LVEDP=15mmHg   2. LVEF=65%   3. Patent LIMA to LAD   4. Patent SVG to D1   5. Occluded SVG to OM1   6. Tandem 95% and 75% stenoses of SVG to PDA   7. 95% stenosis of distal LM   8. Small caliber diffusely diseased LCx and OM1   9. Successful PCI of SVG to PDA with GREGORIA   10. Successful PCI of distal LM with GREGORIA      Other:  Event monitor 1/11/2018  In summary this patient's monitor is consistent with sinus rhythm and a single PVC. There was no atrial fibrillation while the device was in use.      Carotid ultrasound 1/10/2018  There is 20 - 39% right Internal Carotid stenosis.   There is 40 - 49% left Internal Carotid stenosis.       Back pain  Relieved with nitro and aspirin  chest x-ray nonacute, CTA chest with no evidence of pulmonary embolus, Subsegmental atelectasis of the bilateral lungs.       BMI 33.0-33.9,adult  Body mass index is 33 kg/m². Morbid obesity complicates all aspects of disease management from diagnostic modalities to treatment. Weight loss encouraged and health benefits explained to patient.       Bilateral carotid artery stenosis  Carotid ultrasound ordered  Continue aspirin Plavix      Essential hypertension  Chronic, controlled. Latest blood pressure and vitals reviewed-     Temp:  [98 °F (36.7 °C)]   Pulse:  [56-74]   Resp:  [13-19]   BP: (120-133)/(68-80)   SpO2:  [94 %-97 %] .   Home meds for hypertension were reviewed and  noted below.   Hypertension Medications               amLODIPine (NORVASC) 5 MG tablet Take 1 tablet (5 mg total) by mouth once daily.    losartan (COZAAR) 100 MG tablet TAKE 1 TABLET(100 MG) BY MOUTH EVERY DAY            While in the hospital, will manage blood pressure as follows; Continue home antihypertensive regimen    Will utilize p.r.n. blood pressure medication only if patient's blood pressure greater than 180/110 and he develops symptoms such as worsening chest pain or shortness of breath.      VTE Risk Mitigation (From admission, onward)           Ordered     Place sequential compression device  Until discontinued         07/05/24 1252     IP VTE HIGH RISK PATIENT  Once         07/05/24 1232                    Discharge Planning   IFEOMA: 7/9/2024     Code Status: Full Code   Is the patient medically ready for discharge?:     Reason for patient still in hospital (select all that apply): Patient trending condition  Discharge Plan A: Home with family                  Kahlil Martinez MD  Department of Hospital Medicine   [facility]

## 2024-07-08 NOTE — HOSPITAL COURSE
69-year-old male with a past medical history of coronary artery disease status post stent placement LM, SVG to PDA 2015on Plavix and aspirin, status post CABG x 2 2005 (LIMA to LAD, SVG to PDA), Enlarged ascending aorta (4.4 cm), hypertension, hyperlipidemia, bilateral carotid stenosis (20-39% CHARLENE, 30-49% LICA), sleep apnea compliant with CPAP at night, CKD stage 3 presents to the emergency room with reports of mild back discomfort with associated nausea and diaphoresis also reports having some shortness for breath on and off for the last couple of weeks.  Patient denies chest pain.  Patient reports back pain improved with aspirin and nitro today.  Patient reports symptoms are worse after he eats and while at rest.  In the ER, troponin I high sensitivity 37.5, BNP 14, CBC CMP unremarkable, EKG with nonspecific ST and T-wave abnormality chest x-ray nonacute, CTA chest with no evidence of pulmonary embolus, Subsegmental atelectasis of the bilateral lungs.  Admit to hospital medicine due to mildly elevated troponin history of aortic aneurysm, moderate heart risk with heart score 6. Trend serial troponin.   Patient was taking for cardiac catheterization after he was evaluated by cardiologist, no interventions were recommended, patient was cleared for discharge home with outpatient follow-up, medical management was recommended, nitroglycerin prescribed at discharge.  Medically stable and cleared by Cardiology for discharge.

## 2024-07-08 NOTE — NURSING TRANSFER
Nursing Transfer Note      7/8/2024   2:26 PM    Nurse giving handoff:Flor  Nurse receiving handoff:Michael    Reason patient is being transferred: return to room    Transfer To: 1109    Transfer via stretcher    Transfer with cardiac monitoring    Transported by Flor RN    Transfer Vital Signs:  Blood Pressure:  Heart Rate:  O2:  Temperature:  Respirations:    Telemetry: Box Number [id number]  Order for Tele Monitor? Yes    Additional Lines:     4eyes on Skin: yes    Medicines sent: none    Any special needs or follow-up needed: assess right groin for any swelling or bleeding    Patient belongings transferred with patient:  none noted    Chart send with patient: Yes    Notified:     Patient reassessed at: 07/08/2024 1330  Upon arrival to floor: cardiac monitor applied, patient oriented to room, call bell in reach, and bed in lowest position

## 2024-07-08 NOTE — DISCHARGE SUMMARY
[facility] Medicine  Discharge Summary      Patient Name: Preston Arzate  MRN: [id number]  MILES: [id number]  Patient Class: IP- Inpatient  Admission Date: 7/5/2024  Hospital Length of Stay: 2 days  Discharge Date and Time:  07/08/2024 5:47 PM  Attending Physician: Kahlil Martinez MD   Discharging Provider: Kahlil Martinez MD  Primary Care Provider: Matthew Brown IV, MD    Primary Care Team: Networked reference to record PCT     HPI:   69-year-old male with a past medical history of coronary artery disease status post stent placement LM, SVG to PDA 2015on Plavix and aspirin, status post CABG x 2 2005 (LIMA to LAD, SVG to PDA), Enlarged ascending aorta (4.4 cm), hypertension, hyperlipidemia, bilateral carotid stenosis (20-39% CHARLENE, 30-49% LICA), sleep apnea compliant with CPAP at night, CKD stage 3 presents to the emergency room with reports of mild back discomfort with associated nausea and diaphoresis also reports having some shortness for breath on and off for the last couple of weeks.  Patient denies chest pain.  Patient reports back pain improved with aspirin and nitro today.  Patient reports symptoms are worse after he eats and while at rest.  In the ER, troponin I high sensitivity 37.5, BNP 14, CBC CMP unremarkable, EKG with nonspecific ST and T-wave abnormality chest x-ray nonacute, CTA chest with no evidence of pulmonary embolus, Subsegmental atelectasis of the bilateral lungs.  Admit to hospital medicine due to mildly elevated troponin history of aortic aneurysm, moderate heart risk with heart score 6. Trend serial troponin.       Procedure(s) (LRB):  ANGIOGRAM, CORONARY ARTERY (N/A)      Hospital Course:   69-year-old male with a past medical history of coronary artery disease status post stent placement LM, SVG to PDA 2015on Plavix and aspirin, status post CABG x 2 2005 (LIMA to LAD, SVG to PDA), Enlarged ascending aorta (4.4 cm), hypertension, hyperlipidemia,  bilateral carotid stenosis (20-39% CHARLENE, 30-49% LICA), sleep apnea compliant with CPAP at night, CKD stage 3 presents to the emergency room with reports of mild back discomfort with associated nausea and diaphoresis also reports having some shortness for breath on and off for the last couple of weeks.  Patient denies chest pain.  Patient reports back pain improved with aspirin and nitro today.  Patient reports symptoms are worse after he eats and while at rest.  In the ER, troponin I high sensitivity 37.5, BNP 14, CBC CMP unremarkable, EKG with nonspecific ST and T-wave abnormality chest x-ray nonacute, CTA chest with no evidence of pulmonary embolus, Subsegmental atelectasis of the bilateral lungs.  Admit to hospital medicine due to mildly elevated troponin history of aortic aneurysm, moderate heart risk with heart score 6. Trend serial troponin.   Patient was taking for cardiac catheterization after he was evaluated by cardiologist, no interventions were recommended, patient was cleared for discharge home with outpatient follow-up, medical management was recommended, nitroglycerin prescribed at discharge.  Medically stable and cleared by Cardiology for discharge.     Goals of Care Treatment Preferences:  Code Status: Full Code      Consults:   Consults (From admission, onward)          Status Ordering Provider     Inpatient consult to Vascular Surgery  Once        Provider:  Smita Burch MD    Completed BROCK VEGA     Inpatient consult to Cardiology  Once        Provider:  Dhruv Barrett MD    Completed BROCK VEGA            No new Assessment & Plan notes have been filed under this hospital service since the last note was generated.  Service: Hospital Medicine    Final Active Diagnoses:    Diagnosis Date Noted POA    BMI 33.0-33.9,adult [Z68.33] 07/05/2024 Not Applicable    Back pain [M54.9] 07/05/2024 Yes    Bilateral carotid artery stenosis [I65.23] 12/15/2021 Yes    Essential hypertension  "[I10] 10/23/2015 Yes      Problems Resolved During this Admission:    Diagnosis Date Noted Date Resolved POA    PRINCIPAL PROBLEM:  Unstable angina [I20.0] 07/05/2024 07/08/2024 Yes       Discharged Condition: fair    Disposition: Home or Self Care    Follow Up:    Patient Instructions:      Comprehensive metabolic panel   Standing Status: Future Standing Exp. Date: 09/05/25     CBC auto differential   Standing Status: Future Standing Exp. Date: 09/05/25     Protime-INR   Standing Status: Future Standing Exp. Date: 09/05/25     APTT   Standing Status: Future Standing Exp. Date: 09/05/25     COVID-19 Routine Screening   Standing Status: Future Standing Exp. Date: 09/05/25     Order Specific Question Answer Comments   Is the patient symptomatic? No    Is this needed for pre-procedure or pre-op testing? Yes    Diagnosis: Pre-op testing [191498]        Significant Diagnostic Studies: Labs: BMP: No results for input(s): "GLU", "NA", "K", "CL", "CO2", "BUN", "CREATININE", "CALCIUM", "MG" in the last 48 hours., CMP No results for input(s): "NA", "K", "CL", "CO2", "GLU", "BUN", "CREATININE", "CALCIUM", "PROT", "ALBUMIN", "BILITOT", "ALKPHOS", "AST", "ALT", "ANIONGAP", "ESTGFRAFRICA", "EGFRNONAA" in the last 48 hours., and All labs within the past 24 hours have been reviewed    Pending Diagnostic Studies:       Procedure Component Value Units Date/Time    EKG 12-lead [[id number]]     Order Status: Sent Lab Status: No result            Medications:  Reconciled Home Medications:      Medication List        START taking these medications      nitroGLYCERIN 0.4 MG SL tablet  Commonly known as: NITROSTAT  Place 1 tablet (0.4 mg total) under the tongue every 5 (five) minutes as needed for Chest pain.            CONTINUE taking these medications      amLODIPine 5 MG tablet  Commonly known as: NORVASC  Take 1 tablet (5 mg total) by mouth once daily.     ASPIR-81 ORAL  Take 81 mg by mouth once daily.     coenzyme Q10 10 mg " capsule  Take 200 mg by mouth once daily.     ezetimibe 10 mg tablet  Commonly known as: ZETIA  Take 1 tablet (10 mg total) by mouth once daily.     fish oil-omega-3 fatty acids 300-1,000 mg capsule  Take 1,200 g by mouth once daily.     levothyroxine 125 MCG tablet  Commonly known as: SYNTHROID  Take 125 mcg by mouth once daily.     losartan 100 MG tablet  Commonly known as: COZAAR  TAKE 1 TABLET(100 MG) BY MOUTH EVERY DAY     ranolazine 500 MG Tb12  Commonly known as: RANEXA  Take 1 tablet (500 mg total) by mouth 2 (two) times daily.     rosuvastatin 20 MG tablet  Commonly known as: CRESTOR  Take 1 tablet (20 mg total) by mouth once daily.            STOP taking these medications      etodolac 400 MG tablet  Commonly known as: LODINE     testosterone cypionate 200 mg/mL Kit            ASK your doctor about these medications      clopidogreL 75 mg tablet  Commonly known as: PLAVIX  Take 75 mg by mouth once daily.              Indwelling Lines/Drains at time of discharge:   Lines/Drains/Airways       None                   Time spent on the discharge of patient: 35 minutes         Kahlil Martinez MD  Department of Hospital Medicine  [facility]

## 2024-07-08 NOTE — SUBJECTIVE & OBJECTIVE
Interval History:  Patient was examined.  No acute events overnight, blood pressure improved.  Patient is NPO and going for cardiac cath today.  Continue supportive care.    Review of Systems   Constitutional:  Positive for activity change. Negative for appetite change and diaphoresis.   HENT: Negative.     Respiratory:  Negative for cough, chest tightness, shortness of breath and wheezing.    Cardiovascular:  Negative for chest pain and palpitations.   Gastrointestinal:  Negative for constipation, diarrhea, nausea and vomiting.   Genitourinary: Negative.    Neurological: Negative.    Psychiatric/Behavioral: Negative.       Objective:     Vital Signs (Most Recent):  Temp: 98.1 °F (36.7 °C) (07/08/24 0735)  Pulse: 68 (07/08/24 1315)  Resp: 14 (07/08/24 1315)  BP: 100/65 (07/08/24 1315)  SpO2: (!) 94 % (07/08/24 1315) Vital Signs (24h Range):  Temp:  [97.5 °F (36.4 °C)-98.1 °F (36.7 °C)] 98.1 °F (36.7 °C)  Pulse:  [63-87] 68  Resp:  [11-20] 14  SpO2:  [93 %-96 %] 94 %  BP: (100-153)/(62-91) 100/65     Weight: 108.5 kg (239 lb 3.2 oz)  Body mass index is 34.32 kg/m².    Intake/Output Summary (Last 24 hours) at 7/8/2024 1604  Last data filed at 7/7/2024 2111  Gross per 24 hour   Intake 200 ml   Output --   Net 200 ml         Physical Exam  Vitals reviewed.   Constitutional:       General: He is not in acute distress.     Appearance: Normal appearance. He is obese. He is not ill-appearing.   HENT:      Head: Normocephalic and atraumatic.      Mouth/Throat:      Mouth: Mucous membranes are moist.   Eyes:      Extraocular Movements: Extraocular movements intact.      Pupils: Pupils are equal, round, and reactive to light.   Cardiovascular:      Rate and Rhythm: Normal rate and regular rhythm.      Heart sounds: No murmur heard.  Pulmonary:      Effort: Pulmonary effort is normal.   Abdominal:      General: Bowel sounds are normal.      Palpations: Abdomen is soft.      Tenderness: There is no abdominal tenderness.  "  Musculoskeletal:         General: No swelling. Normal range of motion.      Cervical back: Neck supple.   Skin:     General: Skin is warm and dry.   Neurological:      General: No focal deficit present.      Mental Status: He is alert. Mental status is at baseline.   Psychiatric:         Mood and Affect: Mood normal.             Significant Labs: All pertinent labs within the past 24 hours have been reviewed.  BMP: No results for input(s): "GLU", "NA", "K", "CL", "CO2", "BUN", "CREATININE", "CALCIUM", "MG" in the last 48 hours.  CBC: No results for input(s): "WBC", "HGB", "HCT", "PLT" in the last 48 hours.    Significant Imaging: I have reviewed all pertinent imaging results/findings within the past 24 hours.  "

## 2024-07-08 NOTE — PLAN OF CARE
Problem: Adult Inpatient Plan of Care  Goal: Plan of Care Review  Outcome: Progressing  Goal: Patient-Specific Goal (Individualized)  Outcome: Progressing  Goal: Absence of Hospital-Acquired Illness or Injury  Outcome: Progressing  Goal: Optimal Comfort and Wellbeing  Outcome: Progressing  Goal: Readiness for Transition of Care  Outcome: Progressing     Problem: Acute Coronary Syndrome  Goal: Optimal Adaptation to Illness  Outcome: Progressing  Goal: Absence of Cardiac-Related Pain  Outcome: Progressing  Goal: Normalized Cardiac Rhythm  Outcome: Progressing  Goal: Effective Cardiac Pump Function  Outcome: Progressing  Goal: Adequate Tissue Perfusion  Outcome: Progressing     Problem: Fall Injury Risk  Goal: Absence of Fall and Fall-Related Injury  Outcome: Progressing     Problem: Wound  Goal: Optimal Coping  Outcome: Progressing  Goal: Optimal Functional Ability  Outcome: Progressing  Goal: Absence of Infection Signs and Symptoms  Outcome: Progressing  Goal: Improved Oral Intake  Outcome: Progressing  Goal: Optimal Pain Control and Function  Outcome: Progressing  Goal: Skin Health and Integrity  Outcome: Progressing  Goal: Optimal Wound Healing  Outcome: Progressing

## 2024-07-11 ENCOUNTER — PATIENT MESSAGE (OUTPATIENT)
Dept: CARDIOLOGY | Facility: CLINIC | Age: 69
End: 2024-07-11
Payer: COMMERCIAL

## 2024-07-11 ENCOUNTER — TELEPHONE (OUTPATIENT)
Dept: CARDIOLOGY | Facility: CLINIC | Age: 69
End: 2024-07-11
Payer: COMMERCIAL

## 2024-07-11 NOTE — TELEPHONE ENCOUNTER
We have never seen pt in clinic. Nena saw in hospital. Pt is established for [facility]. Will send them a  message

## 2024-07-11 NOTE — TELEPHONE ENCOUNTER
----- Message from Nasra Moncada sent at 7/11/2024 10:52 AM CDT -----  Type:  Patient Returning Call    Who Called:pt  Would the patient rather a call back or a response via [facility]? call  Best Call Back Number: [phone]  Additional Information: pt states he is still feeling terrible, no different than before. States he was told he would be referred to someone and would like a call back to discuss next steps.

## 2024-07-12 ENCOUNTER — PATIENT MESSAGE (OUTPATIENT)
Dept: CARDIOLOGY | Facility: CLINIC | Age: 69
End: 2024-07-12
Payer: COMMERCIAL

## 2024-07-12 DIAGNOSIS — I25.10 CORONARY ARTERY DISEASE INVOLVING NATIVE CORONARY ARTERY OF NATIVE HEART WITHOUT ANGINA PECTORIS: Primary | ICD-10-CM

## 2024-07-12 DIAGNOSIS — I10 ESSENTIAL HYPERTENSION: ICD-10-CM

## 2024-07-12 DIAGNOSIS — Z95.1 S/P CABG X 4: ICD-10-CM

## 2024-07-13 ENCOUNTER — PATIENT MESSAGE (OUTPATIENT)
Dept: CARDIOLOGY | Facility: CLINIC | Age: 69
End: 2024-07-13
Payer: COMMERCIAL

## 2024-07-18 NOTE — PHYSICIAN QUERY
Please clarify/confirm the consultants diagnosis: NSTEMI    --> Diagnosis ruled in   Diagnosis ruled in, but it resolved prior to my assessment of the patient   Diagnosis ruled out   Other diagnosis (please specify):_____  Clinically undetermined

## 2024-07-19 NOTE — TELEPHONE ENCOUNTER
Dr Gutierres was briefly updated and stated ok to cancel u/s. Pt was updated and verbalized understanding. Message forwarded to Ms Kirby to evaluate last u/s.

## 2024-08-01 ENCOUNTER — OFFICE VISIT (OUTPATIENT)
Dept: CARDIOLOGY | Facility: CLINIC | Age: 69
End: 2024-08-01
Payer: COMMERCIAL

## 2024-08-01 ENCOUNTER — HOSPITAL ENCOUNTER (OUTPATIENT)
Dept: CARDIOLOGY | Facility: CLINIC | Age: 69
Discharge: HOME | End: 2024-08-01
Payer: COMMERCIAL

## 2024-08-01 VITALS
WEIGHT: 224.88 LBS | HEIGHT: 70 IN | BODY MASS INDEX: 32.19 KG/M2 | HEART RATE: 57 BPM | SYSTOLIC BLOOD PRESSURE: 120 MMHG | DIASTOLIC BLOOD PRESSURE: 77 MMHG | OXYGEN SATURATION: 98 %

## 2024-08-01 DIAGNOSIS — Z01.810 PREOP CARDIOVASCULAR EXAM: ICD-10-CM

## 2024-08-01 DIAGNOSIS — I25.10 CORONARY ARTERY DISEASE INVOLVING NATIVE CORONARY ARTERY OF NATIVE HEART WITHOUT ANGINA PECTORIS: Primary | ICD-10-CM

## 2024-08-01 DIAGNOSIS — E78.00 PURE HYPERCHOLESTEROLEMIA: ICD-10-CM

## 2024-08-01 DIAGNOSIS — I10 ESSENTIAL HYPERTENSION: ICD-10-CM

## 2024-08-01 DIAGNOSIS — I65.23 BILATERAL CAROTID ARTERY STENOSIS: ICD-10-CM

## 2024-08-01 DIAGNOSIS — I25.10 CORONARY ARTERY DISEASE INVOLVING NATIVE CORONARY ARTERY OF NATIVE HEART WITHOUT ANGINA PECTORIS: ICD-10-CM

## 2024-08-01 DIAGNOSIS — Z95.1 S/P CABG X 4: ICD-10-CM

## 2024-08-01 LAB
OHS QRS DURATION: 114 MS
OHS QTC CALCULATION: 420 MS

## 2024-08-01 PROCEDURE — 93010 ELECTROCARDIOGRAM REPORT: CPT | Mod: S$GLB,,, | Performed by: INTERNAL MEDICINE

## 2024-08-01 PROCEDURE — 99999 PR PBB SHADOW E&M-EST. PATIENT-LVL III: CPT | Mod: PBBFAC,,, | Performed by: STUDENT IN AN ORGANIZED HEALTH CARE EDUCATION/TRAINING PROGRAM

## 2024-08-01 NOTE — PROGRESS NOTES
I have personally seen and examined the patient. All labs and studies were reviewed. I agree with the fellows findings and plan as above.    Although he is medically optimized for his CAD and now asyptomatic, we discussed postponing an elective surgery for 3 months if feasible given his recent admission for a NSTEMI in the setting of chronic CAD.

## 2024-08-01 NOTE — ASSESSMENT & PLAN NOTE
- Carotid stenosis as above  - Has a history of amaurosis fugax on the same side as current severe lesion  - Continue DAPT as per CAD A&P  - Planning for endarterectomy in the future

## 2024-08-01 NOTE — ASSESSMENT & PLAN NOTE
- Last lipid panel showed LDL 51.6    - Continue rosuvastatin 20mg qd, ezetimibe 10mg qd and fish oil-omega 3 fatty acids 300-1000mg

## 2024-08-01 NOTE — ASSESSMENT & PLAN NOTE
- Patient is a moderate to high risk patient for a high risk procedure  - Patient is medically optimized for surgery, no further cardiovascular evaluation is needed  - If needed, can stop plavix 5 days prior to surgery and resume as soon aspossible  - Risk and benefits have to be weighted between patient and surgeon.     Revised Cardiac Risk Index   High risk surgery: Yes  History of ischemic heart disease: Yes  History of congestive heart failure: No  History of stroke:  Possible TIA  Insulin dependent diabetes: No  Cr > 2: No  2 points, class III risk, 10.1% risk of cardiac event 2014 ACC/AHA Perioperative Guidelines  Known or risk factors for CAD: Yes  High risk of MACE: Yes  Functional capacity < 4 METs: No, proceed without further testing       He has no active cardiac condition (ACS/USA, decompenstated CHF, significant arrhythmias or severe valvular disease) and can easily achieve 4 METS.  As such, pt does not require further cardiac evaluation prior to undergoing surgery.  These recommendations follow the most current Guideline on Perioperative Cardiovascular Evaluation and Management of Patients Undergoing Noncardiac Surgery released by the ACC/AHA (JACC 2014.07.944).

## 2024-08-01 NOTE — PROGRESS NOTES
Cardiology Clinic Note  Reason for Visit: Follow up    HPI:     70yo M patient with cardiac diseases as below, who comes for follow up from hospital discharge from 07/08/24, and for cardiac clearance for carotid endarterectomy per Dr. Burch.    C 07/08/24 showed mutivessel disease, with patent LIMA to LAD and SVG to D1 but 100% stenosed SVG to OM and distal RCA as well as RPAV 100% stenosed. Ramus and mid LCX 50% stenosed. LCX to Distal RCA collateral flow.     TTE 07/05/24 is read as normal EF, but seems to be lower. Will repeat TTE.     Carotid US 07/05/24 showed 70% stenosis of left ICA and 50-69% stenosis of right ICA.    Patient denies chest pain with exertion or at rest, palpitations, shortness of breath, dyspnea on exertion, dizziness, syncope, edema, orthopnea, paroxysmal nocturnal dyspnea, or claudication.    Medical problems  - CAD s/p CABG x 2 2005 (LIMA to LAD, SVG to PDA)  - PCI LM, SVG to PDA 2015  - HTN  - HLD  - Amarousus fugax 2018  - Bilateral carotid stenosis (20-39% CHARLENE, 30-49% LICA)  - REMBERTO on cpap  - Enlarged ascending aorta (4.4 cm)    ROS:    Constitution: Negative for fever, chills, weight loss or gain.   HENT: Negative for sore throat, rhinorrhea, or headache.  Eyes: Negative for blurred or double vision.   Cardiovascular: See above  Pulmonary: Negative for SOB   Gastrointestinal: Negative for abdominal pain, nausea, vomiting, or diarrhea.   : Negative for dysuria.   Neurological: Negative for focal weakness or sensory changes.  PMH:     Past Medical History:   Diagnosis Date    Bilateral carotid artery stenosis 12/15/2021    Bilateral carotid artery stenosis 12/15/2021    Coronary artery disease     Hypertension     Mixed hyperlipidemia 12/15/2021    Pure hypercholesterolemia 10/1/2018    Stroke 01/2018    amarousus fugax left    Thyroid disease      Past Surgical History:   Procedure Laterality Date    CARDIAC CATHETERIZATION  03/2015    x 2    CORONARY ANGIOGRAPHY N/A 7/8/2024     Procedure: ANGIOGRAM, CORONARY ARTERY;  Surgeon: Dhruv Barrett MD;  Location: [facility] CATH/EP LAB;  Service: Cardiology;  Laterality: N/A;    CORONARY ANGIOPLASTY  12/15    GREGORIA X 2 left main and SVG to PDA    CORONARY ARTERY BYPASS GRAFT  07/2005    x 4    TOTAL THYROIDECTOMY       Allergies:     Review of patient's allergies indicates:   Allergen Reactions    Carvedilol      Pedal edema    Imdur [isosorbide mononitrate]      headaches     Medications:     Current Outpatient Medications on File Prior to Visit   Medication Sig Dispense Refill    amLODIPine (NORVASC) 5 MG tablet Take 1 tablet (5 mg total) by mouth once daily. 90 tablet 3    aspirin (ASPIR-81 ORAL) Take 81 mg by mouth once daily.      clopidogreL (PLAVIX) 75 mg tablet Take 75 mg by mouth once daily.      coenzyme Q10 10 mg capsule Take 200 mg by mouth once daily.      ezetimibe (ZETIA) 10 mg tablet Take 1 tablet (10 mg total) by mouth once daily. 90 tablet 3    fish oil-omega-3 fatty acids 300-1,000 mg capsule Take 1,200 g by mouth once daily.      levothyroxine (SYNTHROID) 125 MCG tablet Take 125 mcg by mouth once daily.   3    losartan (COZAAR) 100 MG tablet TAKE 1 TABLET(100 MG) BY MOUTH EVERY DAY (Patient taking differently: Take 100 mg by mouth once daily.) 90 tablet 3    nitroGLYCERIN (NITROSTAT) 0.4 MG SL tablet Place 1 tablet (0.4 mg total) under the tongue every 5 (five) minutes as needed for Chest pain. 30 tablet 0    ranolazine (RANEXA) 500 MG Tb12 Take 1 tablet (500 mg total) by mouth 2 (two) times daily. 180 tablet 3    rosuvastatin (CRESTOR) 20 MG tablet Take 1 tablet (20 mg total) by mouth once daily. (Patient not taking: Reported on 8/1/2024) 90 tablet 3     No current facility-administered medications on file prior to visit.     Social History:     Social History     Tobacco Use    Smoking status: Never    Smokeless tobacco: Never   Substance Use Topics    Alcohol use: No     Family History:     Family History   Problem  "Relation Name Age of Onset    Hypertension Mother      Heart attack Father      Heart disease Sister      Hypertension Sister      Heart attack Paternal Grandfather       Physical Exam:   /77   Pulse (!) 57   Ht 5' 10" (1.778 m)   Wt 102 kg (224 lb 13.9 oz)   SpO2 98%   BMI 32.27 kg/m²    Wt Readings from Last 4 Encounters:   08/01/24 102 kg (224 lb 13.9 oz)   07/05/24 108.5 kg (239 lb 3.2 oz)   07/05/24 104.3 kg (230 lb)   06/04/24 107.2 kg (236 lb 5.3 oz)       Constitutional: No distress, obese, conversant  HEENT: Sclera anicteric, PERRLA  Neck: No JVD, no masses, good movement  CV: RRR, S1 and S2 normal, no additional heart sounds or murmurs. Pulses 2+ and equal bilaterally in radial arteries, DP and PT areas bilaterally  Pulm: Clear to auscultation bilaterally with symmetrical expansion.   GI: Abdomen soft, non-tender, good bowel sounds  Extremities: Both extremities intact and grossly normal, skin is warm, no edema noted  Skin: No ecchymosis, erythema, or ulcers  Neuro: AOx3, CNII-XII intact, no focal deficits    Labs:     Lab Results   Component Value Date     07/06/2024    K 4.1 07/06/2024     07/06/2024    CO2 25 07/06/2024    BUN 16 07/06/2024    CREATININE 1.1 07/06/2024    ANIONGAP 7 (L) 07/06/2024     No results found for: "HGBA1C"  Lab Results   Component Value Date    BNP 14 07/05/2024    BNP 21 06/10/2022    Lab Results   Component Value Date    WBC 9.28 07/06/2024    HGB 13.7 (L) 07/06/2024    HCT 43.2 07/06/2024     07/06/2024    GRAN 5.6 07/06/2024    GRAN 60.5 07/06/2024     Lab Results   Component Value Date    CHOL 117 (L) 07/06/2024    HDL 35 (L) 07/06/2024    LDLCALC 51.6 (L) 07/06/2024    TRIG 152 (H) 07/06/2024          Imaging:         EF   Date Value Ref Range Status   11/08/2022 65 % Final   02/11/2022 55 % Final     Nuc Stress EF   Date Value Ref Range Status   06/22/2020 75 % Final     Nuc Rest EF   Date Value Ref Range Status   06/22/2020 74  Final "       EKG 08/01/24: - Reviewed by me  - Sinus bradycardia, nonspecific ST-T wave changes.     TTE 07/05/24:     Left Ventricle: The left ventricle is normal in size. Normal wall thickness. There is normal systolic function with a visually estimated ejection fraction of 60 - 65%. There is normal diastolic function. E/A ratio is 0.98.    Right Ventricle: Normal right ventricular cavity size. Systolic function is normal.    Mitral Valve: There is mild regurgitation.    Tricuspid Valve: There is mild regurgitation.    Coronary Angiography 07/08/24:    The Mid Cx lesion was 50% stenosed.    The Prox LAD lesion was 100% stenosed.    The Ramus lesion was 50% stenosed.    The RPAV lesion was 100% stenosed.    The Origin lesion was 100% stenosed saphenous vein graft to the OM    The Origin lesion was 100% stenosed saphenous vein graft to the distal RCA    The estimated blood loss was <50 mL.    Patent LIMA to the LAD    Patent saphenous vein graft to the 1st diagonal    Collateral flow from the circumflex artery to the distal RCA    Carotid US 07/05/24   - Greater than 70% left ICA stenosis.  - 50-69% right ICA stenosis.    CTA head and neck 07/22/24 - OSH  1. Moderate to severe stenosis greater than 75 percent left ICA secondary to heterogeneous plaquing of the circumferential nature.   2. Mild to moderate stenosis right ICA estimated 40 percent.   3. Patent vertebral arteries.   4. No acute intracranial arterial abnormality.     PET 06/22/20    There is a very small sized, mild intensity mid inferior reversible perfusion abnormality in the distribution of thePDA indicative of focal coronary stenosis.    Within the perfusion abnormality absolute myocardial perfusion (cc/min/gm) averaged 0.60 cc/min/g at rest, 0.88 cc/min/g at stress and CFR was 1.46 cc/min/g, which equates to mildly reduced coronary flow capacity (Green) in 6% of the myocardium (within the PDA territory) .    Whole heart absolute myocardial perfusion  (cc/min/g) averaged 0.75 cc/min/g at rest, 1.40 cc/min/g at stress, and 1.88  CFR.    Gated perfusion images showed an ejection fraction of 74% at rest and 75% during stress. Normal ejection fraction is greater than 51%.    Wall motion was normal at rest and during stress.    LV cavity size is normal at rest and stress.    The EKG portion of this study is abnormal but not diagnostic.    There were no arrhythmias during stress.    The patient reported no chest pain during the stress test.    When compared to the prior study on 5/14/2018, absolute stress flow and coronary flow capacity has improved in all territories.  These findings are most consistent with either physiologic improvement of CAD or likely caffeine effect on the prior study.  Overall, the perfusion patterns are visually very similar.    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[employer]) and no chest pain or shortness of breath with activity    - Continue ASA 81mg and clopidogrel 75mg qd  - Continue ranolazine  - Continue lipid lowering therapy as other A&P    Essential hypertension  - BP <130/80 on this visit and as per patient similar at home    - Continue amlodipine 5mg, losartan 100mg qd    Pure hypercholesterolemia  - Last lipid panel showed LDL 51.6    - Continue rosuvastatin 20mg qd, ezetimibe 10mg qd and fish oil-omega 3 fatty acids 300-1000mg    Bilateral carotid artery stenosis  - Carotid stenosis as above  - Has a history of amaurosis fugax on the same side as current severe lesion  - Continue DAPT as per CAD A&P  - Planning for endarterectomy in the future    Preop cardiovascular exam  - Patient is a moderate to high risk patient for a high risk  procedure  - Patient is medically optimized for surgery, no further cardiovascular evaluation is needed  - If needed, can stop plavix 5 days prior to surgery and resume as soon aspossible  - Risk and benefits have to be weighted between patient and surgeon.     Revised Cardiac Risk Index   High risk surgery: Yes  History of ischemic heart disease: Yes  History of congestive heart failure: No  History of stroke:  Possible TIA  Insulin dependent diabetes: No  Cr > 2: No  2 points, class III risk, 10.1% risk of cardiac event 2014 ACC/AHA Perioperative Guidelines  Known or risk factors for CAD: Yes  High risk of MACE: Yes  Functional capacity < 4 METs: No, proceed without further testing       He has no active cardiac condition (ACS/USA, decompenstated CHF, significant arrhythmias or severe valvular disease) and can easily achieve 4 METS.  As such, pt does not require further cardiac evaluation prior to undergoing surgery.  These recommendations follow the most current Guideline on Perioperative Cardiovascular Evaluation and Management of Patients Undergoing Noncardiac Surgery released by the ACC/AHA (JACC 2014.07.944).    Signed:  Kavin Fitzpatrick M.D.  Cardiology Fellow  [facility]  8/1/2024 12:14 AM

## 2024-08-01 NOTE — ASSESSMENT & PLAN NOTE
- BP <130/80 on this visit and as per patient similar at home    - Continue amlodipine 5mg, losartan 100mg qd

## 2024-08-01 NOTE — ASSESSMENT & PLAN NOTE
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

## 2024-08-02 ENCOUNTER — TELEPHONE (OUTPATIENT)
Dept: CARDIOLOGY | Facility: CLINIC | Age: 69
End: 2024-08-02
Payer: COMMERCIAL

## 2024-08-06 ENCOUNTER — TELEPHONE (OUTPATIENT)
Dept: CARDIOLOGY | Facility: CLINIC | Age: 69
End: 2024-08-06
Payer: COMMERCIAL

## 2024-09-05 RX ORDER — RANOLAZINE 500 MG/1
500 TABLET, EXTENDED RELEASE ORAL 2 TIMES DAILY
Qty: 180 TABLET | Refills: 3 | Status: SHIPPED | OUTPATIENT
Start: 2024-09-05

## 2024-09-07 DIAGNOSIS — M25.561 RIGHT KNEE PAIN, UNSPECIFIED CHRONICITY: Primary | ICD-10-CM

## 2024-09-11 ENCOUNTER — TELEPHONE (OUTPATIENT)
Dept: ORTHOPEDICS | Facility: CLINIC | Age: 69
End: 2024-09-11
Payer: COMMERCIAL

## 2024-09-11 NOTE — TELEPHONE ENCOUNTER
Called pt. Advised that due to the weather, our clinic will not be opening until 10 am tomorrow. We need to reschedule pts appt. R/S to 1 pm with Dr. Nagel. Thanks, Kinsey

## 2024-09-23 DIAGNOSIS — I25.10 CORONARY ARTERY DISEASE INVOLVING NATIVE CORONARY ARTERY OF NATIVE HEART WITHOUT ANGINA PECTORIS: ICD-10-CM

## 2024-09-23 DIAGNOSIS — I10 ESSENTIAL HYPERTENSION: ICD-10-CM

## 2024-09-23 RX ORDER — AMLODIPINE BESYLATE 5 MG/1
5 TABLET ORAL DAILY
Qty: 90 TABLET | Refills: 3 | Status: SHIPPED | OUTPATIENT
Start: 2024-09-23 | End: 2024-09-24 | Stop reason: SDUPTHER

## 2024-09-24 DIAGNOSIS — I25.10 CORONARY ARTERY DISEASE INVOLVING NATIVE CORONARY ARTERY OF NATIVE HEART WITHOUT ANGINA PECTORIS: ICD-10-CM

## 2024-09-24 DIAGNOSIS — I10 ESSENTIAL HYPERTENSION: ICD-10-CM

## 2024-09-24 RX ORDER — AMLODIPINE BESYLATE 5 MG/1
5 TABLET ORAL DAILY
Qty: 90 TABLET | Refills: 3 | Status: SHIPPED | OUTPATIENT
Start: 2024-09-24

## 2024-10-16 ENCOUNTER — HOSPITAL ENCOUNTER (OUTPATIENT)
Dept: PREADMISSION TESTING | Facility: HOSPITAL | Age: 69
Discharge: HOME | End: 2024-10-16

## 2024-10-16 VITALS — WEIGHT: 219 LBS | BODY MASS INDEX: 31.42 KG/M2

## 2024-10-16 NOTE — CARE UPDATE
Spoke to patient - he was supposed to come today for labs and EKG - did not come - states he will be here early am Thursday

## 2024-10-16 NOTE — CARE UPDATE
Preadmit assessment complete. Review of pt health history and home medications.  Preop education per AVS. Pt voiced understanding      Phone interview with patient - reviewed history and medications. Advised may  have water until 2 hours of arrival time. Advised to take amlodipine levothyroxine and ranolazine am of surgery - advised not to take losartan at 2200 night prior to surgery - advised to bring cpap - has stopped plavix per MD - advised to shower with DIAL  - questions answered and all instructions sent to [facility].

## 2024-10-17 ENCOUNTER — HOSPITAL ENCOUNTER (OUTPATIENT)
Dept: PREADMISSION TESTING | Facility: HOSPITAL | Age: 69
Discharge: HOME | End: 2024-10-17
Attending: STUDENT IN AN ORGANIZED HEALTH CARE EDUCATION/TRAINING PROGRAM
Payer: COMMERCIAL

## 2024-10-17 ENCOUNTER — ANESTHESIA EVENT (OUTPATIENT)
Dept: SURGERY | Facility: HOSPITAL | Age: 69
End: 2024-10-17
Payer: COMMERCIAL

## 2024-10-17 VITALS
SYSTOLIC BLOOD PRESSURE: 132 MMHG | OXYGEN SATURATION: 98 % | DIASTOLIC BLOOD PRESSURE: 82 MMHG | HEART RATE: 68 BPM | TEMPERATURE: 98 F | RESPIRATION RATE: 18 BRPM

## 2024-10-17 DIAGNOSIS — Z01.818 PRE-OP TESTING: Primary | ICD-10-CM

## 2024-10-17 DIAGNOSIS — Z41.9 SURGERY, ELECTIVE: ICD-10-CM

## 2024-10-17 LAB
BLD PROD TYP BPU: NORMAL
BLD PROD TYP BPU: NORMAL
BLOOD UNIT EXPIRATION DATE: NORMAL
BLOOD UNIT EXPIRATION DATE: NORMAL
BLOOD UNIT TYPE CODE: 6200
BLOOD UNIT TYPE CODE: 6200
BLOOD UNIT TYPE: NORMAL
BLOOD UNIT TYPE: NORMAL
CODING SYSTEM: NORMAL
CODING SYSTEM: NORMAL
CROSSMATCH INTERPRETATION: NORMAL
CROSSMATCH INTERPRETATION: NORMAL
DISPENSE STATUS: NORMAL
DISPENSE STATUS: NORMAL
NUM UNITS TRANS PACKED RBC: NORMAL
NUM UNITS TRANS PACKED RBC: NORMAL
OHS QRS DURATION: 110 MS
OHS QTC CALCULATION: 426 MS

## 2024-10-17 PROCEDURE — 86920 COMPATIBILITY TEST SPIN: CPT | Performed by: STUDENT IN AN ORGANIZED HEALTH CARE EDUCATION/TRAINING PROGRAM

## 2024-10-17 RX ORDER — CEFAZOLIN SODIUM 2 G/50ML
2 SOLUTION INTRAVENOUS ONCE
Status: CANCELLED | OUTPATIENT
Start: 2024-10-18

## 2024-10-17 NOTE — DISCHARGE INSTRUCTIONS
To confirm, Your doctor has instructed you that surgery is scheduled for: Oct. 18     Pre-Op will call today  between 4:00 and 6:00 PM with the final arrival time.       1 Person can come with you the day of surgery.    Please park in the Garage Parking and come through front entrance.      GO TO REGISTRATION     After registration, proceed past gift shop and through glass door ( [facility]) Check in at the nurses station to the left.   Do not eat  anything after midnight the night before your surgery - THIS INCLUDES  GUM, MINTS AND CANDY.    You may drink water, gatorade up until 2 hours of your hospital arrival time       TAKE ONLY THESE MEDICATIONS WITH A SMALL SIP OF WATER THE MORNING OF YOUR PROCEDURE: Ranexa     Plavix has been stopped    Bring your CPAP    PLEASE NOTE:  The surgery schedule has many variables which may affect the time of your surgery case.  Family members should be available if your surgery time changes.  Plan to be here the day of your procedure between 4-6 hours.      DO NOT TAKE THESE MEDICATIONS 5-7 DAYS PRIOR to your procedure or per your surgeon's request: ASPIRIN, ALEVE, ADVIL, IBUPROFEN,  JAMIE SELTZER, BC , FISH OIL , VITAMIN E, HERBALS  (May take Tylenol)      ONLY if you are prescribed any types of blood thinners such as:  Aspirin, Coumadin, Plavix, Pradaxa, Xarelto, Aggrenox, Effient, Eliquis, Savasya, Brilinta, or any other, ask your surgeon whether you should stop taking them and how long before surgery you should stop.  You may also need to verify with the prescribing physician if it is ok to stop your medication.                                                        IMPORTANT INSTRUCTIONS      Do not smoke, vape or drink alcoholic beverages 24 hours prior to your procedure.  Shower the night before AND the morning of your procedure with a Chlorhexidine wash such as Hibiclens or Dial antibacterial soap from the neck down.   No lotions, powder or oils on your skin  after you shower. DO NOT WEAR DEODORANT   Do not get it on your face or in your eyes.  You may use your own shampoo and face wash. This helps your skin to be as bacteria free as possible.    DO NOT remove hair from the surgery site.  Do not shave the incision site unless you are given specific instructions to do so.    Sleep in a bed with clean sheets.    Do not sleep with a pet in the bed.   If you wear contact lenses, dentures, hearing aids or glasses, bring a container to put them in during surgery and give to a family member for safe keeping.    Please leave all jewelry, piercing's and valuables at home.   Wear rubber soled shoes (no flip flops).  ONLY if you have been diagnosed with sleep apnea please bring your C-PAP machine.    If your doctor has scheduled you for an overnight stay, bring a small overnight bag with any personal items you need.    Make arrangements in advance for transportation home by a responsible adult.      You must make arrangements for transportation, TAXI'S, UBER'S OR LYFTS ARE NOT ALLOWED.        If you have any questions about these instructions, call (Monday - Friday) Pre-Op Admit  Nursing  at [phone] or the Pre-Op Day Surgery Unit at [phone].

## 2024-10-17 NOTE — ANESTHESIA PREPROCEDURE EVALUATION
10/18/2024  Preston Arzate is a 69 y.o., male.      Results for orders placed or performed during the hospital encounter of 10/17/24   EKG 12-lead    Collection Time: 10/17/24  8:44 AM   Result Value Ref Range    QRS Duration 110 ms    OHS QTC Calculation 426 ms    Narrative    Test Reason : Z01.818,    Vent. Rate : 065 BPM     Atrial Rate : 065 BPM     P-R Int : 182 ms          QRS Dur : 110 ms      QT Int : 410 ms       P-R-T Axes : 045 057 086 degrees     QTc Int : 426 ms    Sinus rhythm with occasional Premature ventricular complexes  Nonspecific ST abnormality  Abnormal ECG  When compared with ECG of 01-AUG-2024 08:31,  Premature ventricular complexes are now Present  Minimal criteria for Inferior infarct are no longer Present    Referred By:             Confirmed By:              Lab Results   Component Value Date    WBC 8.61 10/17/2024    HGB 14.8 10/17/2024    HCT 46.5 10/17/2024    MCV 93 10/17/2024     10/17/2024     BMP  Lab Results   Component Value Date     10/17/2024    K 5.0 10/17/2024     10/17/2024    CO2 29 10/17/2024    BUN 19 10/17/2024    CREATININE 1.4 10/17/2024    CALCIUM 9.6 10/17/2024    ANIONGAP 5 (L) 10/17/2024     10/17/2024     07/06/2024     07/05/2024       Results for orders placed during the hospital encounter of 07/05/24    Echo    Interpretation Summary    Left Ventricle: The left ventricle is normal in size. Normal wall thickness. There is normal systolic function with a visually estimated ejection fraction of 60 - 65%. There is normal diastolic function. E/A ratio is 0.98.    Right Ventricle: Normal right ventricular cavity size. Systolic function is normal.    Mitral Valve: There is mild regurgitation.    Tricuspid Valve: There is mild regurgitation.    CTA Head and Neck (xpd)       Impression    Impression:    1. Moderate  to severe stenosis greater than 75 percent left ICA secondary to heterogeneous plaquing of the circumferential nature.  2. Mild to moderate stenosis right ICA estimated 40 percent.  3. Patent vertebral arteries.  4. No acute intracranial arterial abnormality.    Electronically Signed By: Moises Salmeron MD 7/22/2024 4:25 PM CDT       10/17/24 09:27   Group & Rh A POS   INDIRECT AURY NEG   Specimen Outdate 10/20/2024 23:59          Pre-op Assessment    I have reviewed the Patient Summary Reports.     I have reviewed the Nursing Notes. I have reviewed the NPO Status.   I have reviewed the Medications.     Review of Systems  Anesthesia Hx:  No problems with previous Anesthesia   History of prior surgery of interest to airway management or planning: heart surgery.         Denies Family Hx of Anesthesia complications.    Denies Personal Hx of Anesthesia complications.                    Social:  Non-Smoker, No Alcohol Use       Hematology/Oncology:    Oncology Normal                Hematology Comments: Plavix therapy last dose 10/14/2024                    EENT/Dental:  EENT/Dental Normal           Cardiovascular:     Hypertension, well controlled   CAD   CABG/stent       PVD hyperlipidemia   ECG has been reviewed. Heart rate 57 beats per minute in preop clinic  Nitrostat last use approximately 1 year ago  Patient follow up with Dr. Gutierres last seen 6 months ago              Carotoid Artery Disease, bilateral , Left stenosis is > 75 %%              Pulmonary:        Sleep Apnea, CPAP           Education provided regarding risk of obstructive sleep apnea            Renal/:  Chronic Renal Disease        Kidney Function/Disease, Chronic Kidney Disease (CKD) , CKD Stage III (GFR 30-59)            Hepatic/GI:  Hepatic/GI Normal                    Musculoskeletal:         Spine Disorders: lumbar Chronic Pain           Neurological:  TIA, CVA, no residual symptoms Neuromuscular Disease,                                    Endocrine:   Hypothyroidism        Obesity / BMI > 30  Psych:  Psychiatric Normal                    Physical Exam  General: Well nourished, Cooperative, Alert and Oriented    Airway:  Mallampati: IV   Mouth Opening: Small, but > 3cm  TM Distance: > 6 cm  Tongue: Normal  Neck ROM: Normal ROM    Dental:  Partial Dentures    Chest/Lungs:  Clear to auscultation, Normal Respiratory Rate    Heart:  Rate: Normal  Rhythm: Regular Rhythm        Anesthesia Plan  Type of Anesthesia, risks & benefits discussed:    Anesthesia Type: Gen ETT  Intra-op Monitoring Plan: Standard ASA Monitors and Art Line  Post Op Pain Control Plan: multimodal analgesia and IV/PO Opioids PRN  Induction:  IV  Airway Plan: Video, Post-Induction  Informed Consent: Informed consent signed with the Patient and all parties understand the risks and agree with anesthesia plan.  All questions answered.   ASA Score: 4  Anesthesia Plan Notes:       GETA  LTA  2nd peripheral IV  Radial artery catheter  No Precedex  No Ketamine  Reduced Versed  Decadron 8mg, iv Zofran 4mg iv, Pepcid 20mg iv   Ofirmev 1000mg iv  Sugammadex   REMBERTO Precautions: Extubate patient awake with HOB elevated and oral airway in place       Ready For Surgery From Anesthesia Perspective.     .

## 2024-10-18 ENCOUNTER — ANESTHESIA (OUTPATIENT)
Dept: SURGERY | Facility: HOSPITAL | Age: 69
End: 2024-10-18
Payer: COMMERCIAL

## 2024-10-18 ENCOUNTER — HOSPITAL ENCOUNTER (INPATIENT)
Facility: HOSPITAL | Age: 69
LOS: 1 days | Discharge: HOME | DRG: 039 | End: 2024-10-19
Attending: STUDENT IN AN ORGANIZED HEALTH CARE EDUCATION/TRAINING PROGRAM | Admitting: STUDENT IN AN ORGANIZED HEALTH CARE EDUCATION/TRAINING PROGRAM
Payer: COMMERCIAL

## 2024-10-18 DIAGNOSIS — Z41.9 SURGERY, ELECTIVE: ICD-10-CM

## 2024-10-18 DIAGNOSIS — I65.22 CAROTID STENOSIS, LEFT: Primary | ICD-10-CM

## 2024-10-18 LAB
ANION GAP SERPL CALC-SCNC: 9 MMOL/L (ref 8–16)
APTT PPP: 29.4 SEC (ref 21–32)
BASOPHILS # BLD AUTO: 0.03 K/UL (ref 0–0.2)
BASOPHILS NFR BLD: 0.3 % (ref 0–1.9)
BUN SERPL-MCNC: 14 MG/DL (ref 8–23)
CALCIUM SERPL-MCNC: 8.4 MG/DL (ref 8.7–10.5)
CHLORIDE SERPL-SCNC: 107 MMOL/L (ref 95–110)
CO2 SERPL-SCNC: 23 MMOL/L (ref 23–29)
CREAT SERPL-MCNC: 1.2 MG/DL (ref 0.5–1.4)
DIFFERENTIAL METHOD BLD: ABNORMAL
EOSINOPHIL # BLD AUTO: 0 K/UL (ref 0–0.5)
EOSINOPHIL NFR BLD: 0.2 % (ref 0–8)
ERYTHROCYTE [DISTWIDTH] IN BLOOD BY AUTOMATED COUNT: 14 % (ref 11.5–14.5)
EST. GFR  (NO RACE VARIABLE): >60 ML/MIN/1.73 M^2
GLUCOSE SERPL-MCNC: 137 MG/DL (ref 70–110)
HCT VFR BLD AUTO: 41.4 % (ref 40–54)
HGB BLD-MCNC: 13.7 G/DL (ref 14–18)
IMM GRANULOCYTES # BLD AUTO: 0.07 K/UL (ref 0–0.04)
IMM GRANULOCYTES NFR BLD AUTO: 0.7 % (ref 0–0.5)
LYMPHOCYTES # BLD AUTO: 1 K/UL (ref 1–4.8)
LYMPHOCYTES NFR BLD: 10 % (ref 18–48)
MCH RBC QN AUTO: 30.2 PG (ref 27–31)
MCHC RBC AUTO-ENTMCNC: 33.1 G/DL (ref 32–36)
MCV RBC AUTO: 91 FL (ref 82–98)
MONOCYTES # BLD AUTO: 0.2 K/UL (ref 0.3–1)
MONOCYTES NFR BLD: 2.2 % (ref 4–15)
NEUTROPHILS # BLD AUTO: 8.3 K/UL (ref 1.8–7.7)
NEUTROPHILS NFR BLD: 86.6 % (ref 38–73)
NRBC BLD-RTO: 0 /100 WBC
PLATELET # BLD AUTO: 229 K/UL (ref 150–450)
PMV BLD AUTO: 9.9 FL (ref 9.2–12.9)
POC ACTIVATED CLOTTING TIME K: 134 SEC (ref 74–137)
POC ACTIVATED CLOTTING TIME K: 146 SEC (ref 74–137)
POC ACTIVATED CLOTTING TIME K: 268 SEC (ref 74–137)
POCT GLUCOSE: 132 MG/DL (ref 70–110)
POTASSIUM SERPL-SCNC: 4 MMOL/L (ref 3.5–5.1)
RBC # BLD AUTO: 4.53 M/UL (ref 4.6–6.2)
SAMPLE: ABNORMAL
SAMPLE: ABNORMAL
SAMPLE: NORMAL
SODIUM SERPL-SCNC: 139 MMOL/L (ref 136–145)
WBC # BLD AUTO: 9.57 K/UL (ref 3.9–12.7)

## 2024-10-18 PROCEDURE — 94761 N-INVAS EAR/PLS OXIMETRY MLT: CPT

## 2024-10-18 PROCEDURE — 25000003 PHARM REV CODE 250

## 2024-10-18 PROCEDURE — 27000221 HC OXYGEN, UP TO 24 HOURS

## 2024-10-18 PROCEDURE — 37000009 HC ANESTHESIA EA ADD 15 MINS: Performed by: STUDENT IN AN ORGANIZED HEALTH CARE EDUCATION/TRAINING PROGRAM

## 2024-10-18 PROCEDURE — 99900035 HC TECH TIME PER 15 MIN (STAT)

## 2024-10-18 PROCEDURE — 03CL0ZZ EXTIRPATION OF MATTER FROM LEFT INTERNAL CAROTID ARTERY, OPEN APPROACH: ICD-10-PCS | Performed by: STUDENT IN AN ORGANIZED HEALTH CARE EDUCATION/TRAINING PROGRAM

## 2024-10-18 PROCEDURE — 36000707: Performed by: STUDENT IN AN ORGANIZED HEALTH CARE EDUCATION/TRAINING PROGRAM

## 2024-10-18 PROCEDURE — 37000008 HC ANESTHESIA 1ST 15 MINUTES: Performed by: STUDENT IN AN ORGANIZED HEALTH CARE EDUCATION/TRAINING PROGRAM

## 2024-10-18 PROCEDURE — 36620 INSERTION CATHETER ARTERY: CPT | Performed by: ANESTHESIOLOGY

## 2024-10-18 PROCEDURE — 63600175 PHARM REV CODE 636 W HCPCS: Performed by: STUDENT IN AN ORGANIZED HEALTH CARE EDUCATION/TRAINING PROGRAM

## 2024-10-18 PROCEDURE — C9248 INJ, CLEVIDIPINE BUTYRATE: HCPCS

## 2024-10-18 PROCEDURE — 63600175 PHARM REV CODE 636 W HCPCS

## 2024-10-18 PROCEDURE — 25000003 PHARM REV CODE 250: Performed by: ANESTHESIOLOGY

## 2024-10-18 PROCEDURE — 85025 COMPLETE CBC W/AUTO DIFF WBC: CPT | Performed by: STUDENT IN AN ORGANIZED HEALTH CARE EDUCATION/TRAINING PROGRAM

## 2024-10-18 PROCEDURE — 71000033 HC RECOVERY, INTIAL HOUR: Performed by: STUDENT IN AN ORGANIZED HEALTH CARE EDUCATION/TRAINING PROGRAM

## 2024-10-18 PROCEDURE — 71000039 HC RECOVERY, EACH ADD'L HOUR: Performed by: STUDENT IN AN ORGANIZED HEALTH CARE EDUCATION/TRAINING PROGRAM

## 2024-10-18 PROCEDURE — 94799 UNLISTED PULMONARY SVC/PX: CPT

## 2024-10-18 PROCEDURE — 20000000 HC ICU ROOM

## 2024-10-18 PROCEDURE — 85730 THROMBOPLASTIN TIME PARTIAL: CPT | Performed by: STUDENT IN AN ORGANIZED HEALTH CARE EDUCATION/TRAINING PROGRAM

## 2024-10-18 PROCEDURE — 25000003 PHARM REV CODE 250: Performed by: STUDENT IN AN ORGANIZED HEALTH CARE EDUCATION/TRAINING PROGRAM

## 2024-10-18 PROCEDURE — 27800903 OPTIME MED/SURG SUP & DEVICES OTHER IMPLANTS: Performed by: STUDENT IN AN ORGANIZED HEALTH CARE EDUCATION/TRAINING PROGRAM

## 2024-10-18 PROCEDURE — 03UL0KZ SUPPLEMENT LEFT INTERNAL CAROTID ARTERY WITH NONAUTOLOGOUS TISSUE SUBSTITUTE, OPEN APPROACH: ICD-10-PCS | Performed by: STUDENT IN AN ORGANIZED HEALTH CARE EDUCATION/TRAINING PROGRAM

## 2024-10-18 PROCEDURE — 80048 BASIC METABOLIC PNL TOTAL CA: CPT | Performed by: STUDENT IN AN ORGANIZED HEALTH CARE EDUCATION/TRAINING PROGRAM

## 2024-10-18 PROCEDURE — C1768 GRAFT, VASCULAR: HCPCS | Performed by: STUDENT IN AN ORGANIZED HEALTH CARE EDUCATION/TRAINING PROGRAM

## 2024-10-18 PROCEDURE — 99900031 HC PATIENT EDUCATION (STAT)

## 2024-10-18 PROCEDURE — 27201423 OPTIME MED/SURG SUP & DEVICES STERILE SUPPLY: Performed by: STUDENT IN AN ORGANIZED HEALTH CARE EDUCATION/TRAINING PROGRAM

## 2024-10-18 PROCEDURE — 36000706: Performed by: STUDENT IN AN ORGANIZED HEALTH CARE EDUCATION/TRAINING PROGRAM

## 2024-10-18 PROCEDURE — 88304 TISSUE EXAM BY PATHOLOGIST: CPT | Mod: TC | Performed by: PATHOLOGY

## 2024-10-18 DEVICE — PATCH XENOSURE TAPR .8X8CM: Type: IMPLANTABLE DEVICE | Site: NECK | Status: FUNCTIONAL

## 2024-10-18 RX ORDER — NALOXONE HCL 0.4 MG/ML
0.02 VIAL (ML) INJECTION ONCE AS NEEDED
Status: DISCONTINUED | OUTPATIENT
Start: 2024-10-18 | End: 2024-10-19 | Stop reason: HOSPADM

## 2024-10-18 RX ORDER — ASPIRIN 81 MG/1
81 TABLET ORAL DAILY
Status: DISCONTINUED | OUTPATIENT
Start: 2024-10-18 | End: 2024-10-19 | Stop reason: HOSPADM

## 2024-10-18 RX ORDER — ONDANSETRON HYDROCHLORIDE 2 MG/ML
4 INJECTION, SOLUTION INTRAVENOUS EVERY 6 HOURS PRN
Status: DISCONTINUED | OUTPATIENT
Start: 2024-10-18 | End: 2024-10-19 | Stop reason: HOSPADM

## 2024-10-18 RX ORDER — DIPHENHYDRAMINE HYDROCHLORIDE 50 MG/ML
INJECTION INTRAMUSCULAR; INTRAVENOUS
Status: DISCONTINUED | OUTPATIENT
Start: 2024-10-18 | End: 2024-10-18

## 2024-10-18 RX ORDER — DEXAMETHASONE SODIUM PHOSPHATE 4 MG/ML
INJECTION, SOLUTION INTRA-ARTICULAR; INTRALESIONAL; INTRAMUSCULAR; INTRAVENOUS; SOFT TISSUE
Status: DISCONTINUED | OUTPATIENT
Start: 2024-10-18 | End: 2024-10-18

## 2024-10-18 RX ORDER — VECURONIUM BROMIDE 1 MG/ML
INJECTION, POWDER, LYOPHILIZED, FOR SOLUTION INTRAVENOUS
Status: DISCONTINUED | OUTPATIENT
Start: 2024-10-18 | End: 2024-10-18

## 2024-10-18 RX ORDER — OXYCODONE HYDROCHLORIDE 5 MG/1
5 TABLET ORAL
Status: DISCONTINUED | OUTPATIENT
Start: 2024-10-18 | End: 2024-10-18 | Stop reason: HOSPADM

## 2024-10-18 RX ORDER — LIDOCAINE HYDROCHLORIDE 20 MG/ML
INJECTION, SOLUTION EPIDURAL; INFILTRATION; INTRACAUDAL; PERINEURAL
Status: DISCONTINUED | OUTPATIENT
Start: 2024-10-18 | End: 2024-10-18

## 2024-10-18 RX ORDER — CEFAZOLIN SODIUM 1 G/3ML
INJECTION, POWDER, FOR SOLUTION INTRAMUSCULAR; INTRAVENOUS
Status: DISCONTINUED | OUTPATIENT
Start: 2024-10-18 | End: 2024-10-18 | Stop reason: HOSPADM

## 2024-10-18 RX ORDER — RANOLAZINE 500 MG/1
500 TABLET, EXTENDED RELEASE ORAL 2 TIMES DAILY
Status: DISCONTINUED | OUTPATIENT
Start: 2024-10-18 | End: 2024-10-19 | Stop reason: HOSPADM

## 2024-10-18 RX ORDER — HYDROMORPHONE HYDROCHLORIDE 1 MG/ML
0.2 INJECTION, SOLUTION INTRAMUSCULAR; INTRAVENOUS; SUBCUTANEOUS EVERY 5 MIN PRN
Status: DISCONTINUED | OUTPATIENT
Start: 2024-10-18 | End: 2024-10-18 | Stop reason: HOSPADM

## 2024-10-18 RX ORDER — OXYCODONE HYDROCHLORIDE 5 MG/1
5 TABLET ORAL EVERY 4 HOURS PRN
Status: DISCONTINUED | OUTPATIENT
Start: 2024-10-18 | End: 2024-10-19 | Stop reason: HOSPADM

## 2024-10-18 RX ORDER — PROPOFOL 10 MG/ML
VIAL (ML) INTRAVENOUS
Status: DISCONTINUED | OUTPATIENT
Start: 2024-10-18 | End: 2024-10-18

## 2024-10-18 RX ORDER — FENTANYL CITRATE 50 UG/ML
INJECTION, SOLUTION INTRAMUSCULAR; INTRAVENOUS
Status: DISCONTINUED | OUTPATIENT
Start: 2024-10-18 | End: 2024-10-18

## 2024-10-18 RX ORDER — METOPROLOL TARTRATE 1 MG/ML
INJECTION, SOLUTION INTRAVENOUS
Status: DISCONTINUED | OUTPATIENT
Start: 2024-10-18 | End: 2024-10-18

## 2024-10-18 RX ORDER — PROTAMINE SULFATE 10 MG/ML
INJECTION, SOLUTION INTRAVENOUS
Status: DISCONTINUED | OUTPATIENT
Start: 2024-10-18 | End: 2024-10-18

## 2024-10-18 RX ORDER — CEFAZOLIN SODIUM 2 G/50ML
2 SOLUTION INTRAVENOUS ONCE
Status: COMPLETED | OUTPATIENT
Start: 2024-10-18 | End: 2024-10-18

## 2024-10-18 RX ORDER — NOREPINEPHRINE BITARTRATE/D5W 4MG/250ML
0-3 PLASTIC BAG, INJECTION (ML) INTRAVENOUS CONTINUOUS
Status: DISCONTINUED | OUTPATIENT
Start: 2024-10-18 | End: 2024-10-19 | Stop reason: HOSPADM

## 2024-10-18 RX ORDER — EPHEDRINE SULFATE 50 MG/ML
INJECTION, SOLUTION INTRAVENOUS
Status: DISCONTINUED | OUTPATIENT
Start: 2024-10-18 | End: 2024-10-18

## 2024-10-18 RX ORDER — HEPARIN SODIUM 10000 [USP'U]/ML
INJECTION, SOLUTION INTRAVENOUS; SUBCUTANEOUS
Status: DISCONTINUED | OUTPATIENT
Start: 2024-10-18 | End: 2024-10-18

## 2024-10-18 RX ORDER — ONDANSETRON HYDROCHLORIDE 2 MG/ML
4 INJECTION, SOLUTION INTRAVENOUS DAILY PRN
Status: DISCONTINUED | OUTPATIENT
Start: 2024-10-18 | End: 2024-10-18 | Stop reason: HOSPADM

## 2024-10-18 RX ORDER — OXYCODONE HYDROCHLORIDE 5 MG/1
10 TABLET ORAL EVERY 4 HOURS PRN
Status: DISCONTINUED | OUTPATIENT
Start: 2024-10-18 | End: 2024-10-19 | Stop reason: HOSPADM

## 2024-10-18 RX ORDER — SUCCINYLCHOLINE CHLORIDE 20 MG/ML
INJECTION INTRAMUSCULAR; INTRAVENOUS
Status: DISCONTINUED | OUTPATIENT
Start: 2024-10-18 | End: 2024-10-18

## 2024-10-18 RX ORDER — ACETAMINOPHEN 500 MG
1000 TABLET ORAL EVERY 6 HOURS
Status: DISCONTINUED | OUTPATIENT
Start: 2024-10-18 | End: 2024-10-19 | Stop reason: HOSPADM

## 2024-10-18 RX ORDER — SODIUM CHLORIDE 9 MG/ML
INJECTION, SOLUTION INTRAVENOUS CONTINUOUS PRN
Status: COMPLETED | OUTPATIENT
Start: 2024-10-18 | End: 2024-10-18

## 2024-10-18 RX ORDER — CLOPIDOGREL BISULFATE 75 MG/1
75 TABLET ORAL DAILY
Status: DISCONTINUED | OUTPATIENT
Start: 2024-10-18 | End: 2024-10-19 | Stop reason: HOSPADM

## 2024-10-18 RX ORDER — HEPARIN SODIUM 5000 [USP'U]/ML
INJECTION, SOLUTION INTRAVENOUS; SUBCUTANEOUS
Status: DISCONTINUED | OUTPATIENT
Start: 2024-10-18 | End: 2024-10-18 | Stop reason: HOSPADM

## 2024-10-18 RX ORDER — LOSARTAN POTASSIUM 50 MG/1
100 TABLET ORAL DAILY
Status: DISCONTINUED | OUTPATIENT
Start: 2024-10-19 | End: 2024-10-19 | Stop reason: HOSPADM

## 2024-10-18 RX ORDER — ONDANSETRON HYDROCHLORIDE 2 MG/ML
INJECTION, SOLUTION INTRAMUSCULAR; INTRAVENOUS
Status: DISCONTINUED | OUTPATIENT
Start: 2024-10-18 | End: 2024-10-18

## 2024-10-18 RX ORDER — MUPIROCIN 20 MG/G
OINTMENT TOPICAL 2 TIMES DAILY
Status: DISCONTINUED | OUTPATIENT
Start: 2024-10-18 | End: 2024-10-19 | Stop reason: HOSPADM

## 2024-10-18 RX ORDER — MIDAZOLAM HYDROCHLORIDE 1 MG/ML
INJECTION INTRAMUSCULAR; INTRAVENOUS
Status: DISCONTINUED | OUTPATIENT
Start: 2024-10-18 | End: 2024-10-18

## 2024-10-18 RX ORDER — DIPHENHYDRAMINE HYDROCHLORIDE 50 MG/ML
12.5 INJECTION INTRAMUSCULAR; INTRAVENOUS
Status: DISCONTINUED | OUTPATIENT
Start: 2024-10-18 | End: 2024-10-18 | Stop reason: HOSPADM

## 2024-10-18 RX ORDER — DIPHENHYDRAMINE HYDROCHLORIDE 50 MG/ML
12.5 INJECTION INTRAMUSCULAR; INTRAVENOUS EVERY 4 HOURS PRN
Status: DISCONTINUED | OUTPATIENT
Start: 2024-10-18 | End: 2024-10-19 | Stop reason: HOSPADM

## 2024-10-18 RX ORDER — ONDANSETRON HYDROCHLORIDE 2 MG/ML
4 INJECTION, SOLUTION INTRAVENOUS EVERY 12 HOURS PRN
Status: DISCONTINUED | OUTPATIENT
Start: 2024-10-18 | End: 2024-10-19 | Stop reason: HOSPADM

## 2024-10-18 RX ORDER — LIDOCAINE HYDROCHLORIDE 10 MG/ML
INJECTION, SOLUTION INFILTRATION; PERINEURAL
Status: DISCONTINUED | OUTPATIENT
Start: 2024-10-18 | End: 2024-10-18 | Stop reason: HOSPADM

## 2024-10-18 RX ORDER — ATORVASTATIN CALCIUM 40 MG/1
80 TABLET, FILM COATED ORAL NIGHTLY
Status: DISCONTINUED | OUTPATIENT
Start: 2024-10-18 | End: 2024-10-19 | Stop reason: HOSPADM

## 2024-10-18 RX ORDER — GLUCAGON 1 MG
1 KIT INJECTION
Status: DISCONTINUED | OUTPATIENT
Start: 2024-10-18 | End: 2024-10-18 | Stop reason: HOSPADM

## 2024-10-18 RX ORDER — EZETIMIBE 10 MG/1
10 TABLET ORAL DAILY
Status: DISCONTINUED | OUTPATIENT
Start: 2024-10-18 | End: 2024-10-19 | Stop reason: HOSPADM

## 2024-10-18 RX ORDER — AMLODIPINE BESYLATE 5 MG/1
5 TABLET ORAL DAILY
Status: DISCONTINUED | OUTPATIENT
Start: 2024-10-19 | End: 2024-10-19 | Stop reason: HOSPADM

## 2024-10-18 RX ORDER — ROCURONIUM BROMIDE 10 MG/ML
INJECTION, SOLUTION INTRAVENOUS
Status: DISCONTINUED | OUTPATIENT
Start: 2024-10-18 | End: 2024-10-18

## 2024-10-18 RX ORDER — MEPERIDINE HYDROCHLORIDE 50 MG/ML
12.5 INJECTION INTRAMUSCULAR; INTRAVENOUS; SUBCUTANEOUS EVERY 10 MIN PRN
Status: DISCONTINUED | OUTPATIENT
Start: 2024-10-18 | End: 2024-10-18 | Stop reason: HOSPADM

## 2024-10-18 RX ADMIN — ATORVASTATIN CALCIUM 80 MG: 40 TABLET, FILM COATED ORAL at 08:10

## 2024-10-18 RX ADMIN — OXYCODONE HYDROCHLORIDE 5 MG: 5 TABLET ORAL at 11:10

## 2024-10-18 RX ADMIN — ACETAMINOPHEN 1000 MG: 500 TABLET, FILM COATED ORAL at 07:10

## 2024-10-18 RX ADMIN — HEPARIN SODIUM 9000 UNITS: 10000 INJECTION, SOLUTION INTRAVENOUS; SUBCUTANEOUS at 09:10

## 2024-10-18 RX ADMIN — EPHEDRINE SULFATE 10 MG: 50 INJECTION INTRAVENOUS at 10:10

## 2024-10-18 RX ADMIN — Medication 100 MG: at 07:10

## 2024-10-18 RX ADMIN — ROCURONIUM BROMIDE 20 MG: 10 INJECTION, SOLUTION INTRAVENOUS at 07:10

## 2024-10-18 RX ADMIN — MIDAZOLAM HYDROCHLORIDE 1 MG: 1 INJECTION, SOLUTION INTRAMUSCULAR; INTRAVENOUS at 08:10

## 2024-10-18 RX ADMIN — PHENYLEPHRINE HYDROCHLORIDE 0.02 MCG/KG/MIN: 10 INJECTION INTRAVENOUS at 08:10

## 2024-10-18 RX ADMIN — CEFAZOLIN SODIUM 2 G: 2 SOLUTION INTRAVENOUS at 07:10

## 2024-10-18 RX ADMIN — PROPOFOL 150 MG: 10 INJECTION, EMULSION INTRAVENOUS at 07:10

## 2024-10-18 RX ADMIN — VECURONIUM BROMIDE 2 MG: 1 INJECTION, POWDER, LYOPHILIZED, FOR SOLUTION INTRAVENOUS at 08:10

## 2024-10-18 RX ADMIN — PROPOFOL 50 MG: 10 INJECTION, EMULSION INTRAVENOUS at 08:10

## 2024-10-18 RX ADMIN — EPHEDRINE SULFATE 10 MG: 50 INJECTION INTRAVENOUS at 09:10

## 2024-10-18 RX ADMIN — ROCURONIUM BROMIDE 5 MG: 10 INJECTION, SOLUTION INTRAVENOUS at 07:10

## 2024-10-18 RX ADMIN — PROTAMINE SULFATE 80 MG: 10 INJECTION, SOLUTION INTRAVENOUS at 10:10

## 2024-10-18 RX ADMIN — SUGAMMADEX 300 MG: 100 INJECTION, SOLUTION INTRAVENOUS at 11:10

## 2024-10-18 RX ADMIN — ONDANSETRON 8 MG: 2 INJECTION INTRAMUSCULAR; INTRAVENOUS at 07:10

## 2024-10-18 RX ADMIN — MIDAZOLAM HYDROCHLORIDE 1 MG: 1 INJECTION, SOLUTION INTRAMUSCULAR; INTRAVENOUS at 07:10

## 2024-10-18 RX ADMIN — ASPIRIN 81 MG: 81 TABLET, COATED ORAL at 01:10

## 2024-10-18 RX ADMIN — FENTANYL CITRATE 100 MCG: 0.05 INJECTION, SOLUTION INTRAMUSCULAR; INTRAVENOUS at 07:10

## 2024-10-18 RX ADMIN — DEXAMETHASONE SODIUM PHOSPHATE 4 MG: 4 INJECTION, SOLUTION INTRAMUSCULAR; INTRAVENOUS at 07:10

## 2024-10-18 RX ADMIN — EPHEDRINE SULFATE 10 MG: 50 INJECTION INTRAVENOUS at 08:10

## 2024-10-18 RX ADMIN — CLEVIPIDINE 1 MG/HR: 0.5 EMULSION INTRAVENOUS at 10:10

## 2024-10-18 RX ADMIN — DIPHENHYDRAMINE HYDROCHLORIDE 12.5 MG: 50 INJECTION INTRAMUSCULAR; INTRAVENOUS at 07:10

## 2024-10-18 RX ADMIN — LIDOCAINE HYDROCHLORIDE 50 MG: 20 INJECTION, SOLUTION INTRAVENOUS at 07:10

## 2024-10-18 RX ADMIN — SODIUM CHLORIDE, SODIUM LACTATE, POTASSIUM CHLORIDE, AND CALCIUM CHLORIDE: .6; .31; .03; .02 INJECTION, SOLUTION INTRAVENOUS at 08:10

## 2024-10-18 RX ADMIN — ACETAMINOPHEN 1000 MG: 500 TABLET, FILM COATED ORAL at 01:10

## 2024-10-18 RX ADMIN — MUPIROCIN 1 G: 20 OINTMENT TOPICAL at 01:10

## 2024-10-18 RX ADMIN — ACETAMINOPHEN 1000 MG: 500 TABLET, FILM COATED ORAL at 11:10

## 2024-10-18 RX ADMIN — METOPROLOL TARTRATE 2.5 MG: 1 INJECTION, SOLUTION INTRAVENOUS at 11:10

## 2024-10-18 RX ADMIN — MUPIROCIN 1 G: 20 OINTMENT TOPICAL at 08:10

## 2024-10-18 RX ADMIN — SODIUM CHLORIDE: 0.9 INJECTION, SOLUTION INTRAVENOUS at 07:10

## 2024-10-18 RX ADMIN — EZETIMIBE 10 MG: 10 TABLET ORAL at 01:10

## 2024-10-18 RX ADMIN — RANOLAZINE 500 MG: 500 TABLET, FILM COATED, EXTENDED RELEASE ORAL at 08:10

## 2024-10-18 RX ADMIN — ROCURONIUM BROMIDE 25 MG: 10 INJECTION, SOLUTION INTRAVENOUS at 08:10

## 2024-10-18 RX ADMIN — CLOPIDOGREL BISULFATE 75 MG: 75 TABLET, FILM COATED ORAL at 01:10

## 2024-10-18 NOTE — ANESTHESIA POSTPROCEDURE EVALUATION
Anesthesia Post Evaluation    Patient: Preston Arzate    Procedure(s) Performed: Procedure(s) (LRB):  ENDARTERECTOMY, CAROTID (Left)    Final Anesthesia Type: general      Patient location during evaluation: PACU  Patient participation: Yes- Able to Participate  Level of consciousness: awake and alert, oriented and awake  Post-procedure vital signs: reviewed and stable  Pain management: adequate  Airway patency: patent  REMBERTO mitigation strategies: Multimodal analgesia, Extubation while patient is awake, Verification of full reversal of neuromuscular block and Extubation and recovery carried out in lateral, semiupright, or other nonsupine position  PONV status at discharge: No PONV  Anesthetic complications: no      Cardiovascular status: blood pressure returned to baseline, hemodynamically stable and stable  Respiratory status: unassisted, spontaneous ventilation and nasal cannula  Hydration status: euvolemic  Follow-up not needed.  Comments: DEMPSEY  REMBERTO Orders Placed               Vitals Value Taken Time   /70 10/18/24 1155   Temp 97.5 10/18/24 1201   Pulse 82 10/18/24 1159   Resp 15 10/18/24 1159   SpO2 96 % 10/18/24 1159   Vitals shown include unfiled device data.      No case tracking events are documented in the log.      Pain/Taina Score: Pain Rating Prior to Med Admin: 3 (10/18/2024 11:55 AM)

## 2024-10-18 NOTE — H&P
[facility]  Vascular Surgery  History and Physical     Patient Name: Preston Arzate  MRN: [id number]  Admission Date: 10/18/2024  Code Status: Prior   Attending Physician: Marcin  Primary Care Physician: Matthew Brown IV, MD    Subjective:     Chief Complaint/Reason for Admission: Severe left ICA stenosis     HPI:  69-year-old male with a past medical history of coronary artery disease s/p CABG and multiple stents, hypertension, hyperlipidemia,sleep apnea compliant with CPAP at night, CKD, hypothyroidism admitted to [facility] in July with cardiac event. Patient had a carotid US performed showing significant stenosis of the left ICA. Patient has no history of stroke, TIA, unilateral weakness, slurred speech, facial drooping or amaurosis fugax. He had prior thyroid surgery, but no other surgery on the neck. No radiation history. He is independent and highly functional. 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 He is a nonsmoker. Patient followed up with as an outpatient with CTA showing severe left ICA stenosis.      No new subjective & objective note has been filed under this hospital service since the last note was generated.    Assessment and Plan:     Bilateral carotid artery stenosis  OR today for left CEA          Smita Burch MD  Vascular Surgery  [facility]

## 2024-10-18 NOTE — ANESTHESIA PROCEDURE NOTES
Arterial    Diagnosis: Left Carotid Artery Stenosis    Patient location during procedure: holding area  Timeout: 10/18/2024 7:10 AM  Procedure end time: 10/18/2024 7:14 AM    Staffing  Authorizing Provider: Rory Goldberg MD  Performing Provider: Rory Goldberg MD    Staffing  Performed by: Rory Goldberg MD  Authorized by: Rory Goldberg MD    Anesthesiologist was present at the time of the procedure.    Preanesthetic Checklist  Completed: patient identified, IV checked, site marked, risks and benefits discussed, surgical consent, monitors and equipment checked, pre-op evaluation, timeout performed and anesthesia consent givenArterial  Skin Prep: chlorhexidine gluconate and isopropyl alcohol  Local Infiltration: lidocaine  Orientation: left  Location: radial    Catheter Size: 20 G  Catheter placement by Ultrasound guidance. Heme positive aspiration all ports.   Vessel Caliber: medium, patent, compressibility normal  Needle advanced into vessel with real time Ultrasound guidance.  Sterile sheath used.Insertion Attempts: 1  Assessment  Dressing: secured with tape and tegaderm, secured with tape, tegaderm and sutured in place and taped  Patient: Tolerated well

## 2024-10-18 NOTE — CARE UPDATE
10/18/24 1328   Patient Assessment/Suction   Level of Consciousness (AVPU) alert   Respiratory Effort Normal;Unlabored   Expansion/Accessory Muscles/Retractions no use of accessory muscles   All Lung Fields Breath Sounds clear   Rhythm/Pattern, Respiratory unlabored   Cough Frequency no cough   PRE-TX-O2   Device (Oxygen Therapy) nasal cannula   $ Is the patient on Low Flow Oxygen? Yes   Flow (L/min) (Oxygen Therapy) 3   SpO2 98 %   Pulse Oximetry Type Continuous   $ Pulse Oximetry - Multiple Charge Pulse Oximetry - Multiple   Pulse 81   Resp 20   /78   Tobacco Cessation Intervention   Do you use any type of tobacco product? No   Respiratory Evaluation   $ Care Plan Tech Time 15 min   $ Respiratory Evaluation Complete   Evaluation For New Orders   Admitting Diagnosis carotid stenosis   Cardiac Diagnosis htn, cad, cabgx4 2015   Pulmonary Diagnosis barbara   Current Surgeries endarterectomy left 10/18/2024   Home Oxygen   Has Home Oxygen? No   Home Aerosol, MDI, DPI, and Other Treatments/Therapies   Home Respiratory Therapy Per Patient/Review of Chart Yes   Other Home Respiratory Therapies cpap   Oxygen Care Plan   Oxygen Care Plan Per Protocol   SPO2 Goal (%) 95% cardiac   Rationale SpO2 is <95% (Cardiac Pt.)   Bronchodilator Care Plan   Rationale No Rationale found   Atelectasis Care Plan   Rationale No Rational Found   Airway Clearance Care Plan   Rationale No rationale found

## 2024-10-18 NOTE — OP NOTE
OPERATIVE NOTE    DATE: 10/18/24      SURGEON: Smita Burch MD    PRE-OPERATIVE DIAGNOSIS:   Severe left carotid artery stenosis    POST-OPERATIVE DIAGNOSIS: Same    PROCEDURE: Left carotid endarterectomy with patch angioplasty    TECHNIQUE:  Patient was taken to the operating room and placed in the supine position on the operating room table. General anesthesia was induced and the patient was intubated. Additional lines were placed. The left neck was prepped and draped in the usual sterile fashion. Pre-operative antibiotics were given, and a surgical time out was performed.     A longitudinal incision was made along the anterior border of the sternocleidomastoid muscle and carried down through the subcutaneous tissue. The platysma muscle was divided. We dissected along the anterior border of the internal jugular vein. The posterior belly of the digastric was partially divided. The facial vein was ligated and divided, and the internal jugular vein was retracted laterally. The vagus nerve was identified. The carotid sheath was identified and opened. The common, internal and external carotid were freed from the surrounding tissue and were controlled using vessel loops. The patient was anticoagulated with heparin for the duration of the case. We localized the hypoglossal nerve which was protected during the case. Once sufficient time had passed, the internal carotid artery was clamped, followed by the external and common carotid arteries. The patient was monitored with continuous EEG which remained stable. The internal carotid was opened with a scalpel and extended with Kaufman scissors into normal internal carotid distally. Using a [place] elevator and fine forceps, a plane was created between the arterial wall and the plaque. We created a good endpoint proximally and distally with Kaufman scissors. An 8 x 81 mm bovine pericardial patch was introduced onto the field. It was cut to appropriate length and size and patch  angioplasty was performed with 7-0 and 6-0 prolene sutures using standard vascular anastomotic techniques. Prior to the completion of this the internal carotid artery was black bled, followed by the common and external carotid, and the last stitch was tied. The external carotid artery clamp was released first followed by the common. The internal carotid artery clamp was released last. Hemostasis was achieved with additional sutures and surgicel. Protamine was given for heparin reversal. The wound was irrigated and closed in layers. 3-0 vicryl was used to close the SCM over the carotid artery, the platysma was closed with 3-0 vicryl, and the skin was closed with 4-0 monocryl. Dermabond was applied to the incision. The patient was extubated by anesthesia, and he moved all extremities well. He had no tongue deviation. At the end of the procedure, the sponge, needle, and instrument counts were correct.    ANESTHESIA TYPE: General with endotracheal intubation    TISSUE REMOVED: Yes - Left carotid plaque    IMPLANTS: Bovine pericardial patch    COMPLICATIONS: No    BLOOD LOSS: 20cc    FINDINGS: Ulcerated left carotid plaque with thrombus in plaque    ASSISTANT:  Shayy Parsons

## 2024-10-18 NOTE — TRANSFER OF CARE
"Anesthesia Transfer of Care Note    Patient: Preston Arzate    Procedure(s) Performed: Procedure(s) (LRB):  ENDARTERECTOMY, CAROTID (Left)    Patient location: PACU    Anesthesia Type: general    Transport from OR: Transported from OR on 2-3 L/min O2 by NC with adequate spontaneous ventilation    Post pain: adequate analgesia    Post assessment: no apparent anesthetic complications and tolerated procedure well    Post vital signs: stable    Level of consciousness: awake, alert and oriented    Nausea/Vomiting: no nausea/vomiting    Complications: none    Transfer of care protocol was followedComments: Report given to PACU RN x 2. Pt AAOx3 sitting up in bed, pt moved all fingers and toe. Pt stuck out tongue. All questions answered.       Last vitals: Visit Vitals  /72   Pulse 80   Temp 36.4 °C (97.5 °F) (Oral)   Resp 18   Ht 5' 10" (1.778 m)   Wt 99.3 kg (219 lb)   SpO2 99%   BMI 31.42 kg/m²     "

## 2024-10-18 NOTE — CARE UPDATE
10/18/24 1328   Patient Assessment/Suction   Level of Consciousness (AVPU) alert   Respiratory Effort Normal;Unlabored   Expansion/Accessory Muscles/Retractions no use of accessory muscles   All Lung Fields Breath Sounds clear   Rhythm/Pattern, Respiratory unlabored   Cough Frequency no cough   PRE-TX-O2   Device (Oxygen Therapy) nasal cannula   $ Is the patient on Low Flow Oxygen? Yes   Flow (L/min) (Oxygen Therapy) 3   SpO2 98 %   Pulse Oximetry Type Continuous   $ Pulse Oximetry - Multiple Charge Pulse Oximetry - Multiple   Pulse 81   Resp 20   Tobacco Cessation Intervention   Do you use any type of tobacco product? No   Respiratory Evaluation   $ Care Plan Tech Time 15 min   $ Respiratory Evaluation Complete   Evaluation For New Orders   Admitting Diagnosis carotid stenosis   Cardiac Diagnosis htn, cad, cabgx4 2015   Pulmonary Diagnosis na   Current Surgeries endarterectomy left 10/18/2024   Home Oxygen   Has Home Oxygen? No   Home Aerosol, MDI, DPI, and Other Treatments/Therapies   Home Respiratory Therapy Per Patient/Review of Chart Yes   Other Home Respiratory Therapies cpap   Oxygen Care Plan   Oxygen Care Plan Per Protocol   SPO2 Goal (%) 95% cardiac   Rationale SpO2 is <95% (Cardiac Pt.)   Bronchodilator Care Plan   Rationale No Rationale found   Atelectasis Care Plan   Rationale No Rational Found   Airway Clearance Care Plan   Rationale No rationale found

## 2024-10-18 NOTE — HPI
69-year-old male with a past medical history of coronary artery disease s/p CABG and multiple stents, hypertension, hyperlipidemia,sleep apnea compliant with CPAP at night, CKD, hypothyroidism admitted to [facility] in July with cardiac event. Patient had a carotid US performed showing significant stenosis of the left ICA. Patient has no history of stroke, TIA, unilateral weakness, slurred speech, facial drooping or amaurosis fugax. He had prior thyroid surgery, but no other surgery on the neck. No radiation history. He is independent and highly functional. 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 He is a nonsmoker. Patient followed up with as an outpatient with CTA showing severe left ICA stenosis.

## 2024-10-19 VITALS
BODY MASS INDEX: 32.61 KG/M2 | TEMPERATURE: 98 F | OXYGEN SATURATION: 97 % | SYSTOLIC BLOOD PRESSURE: 138 MMHG | HEART RATE: 78 BPM | WEIGHT: 227.75 LBS | DIASTOLIC BLOOD PRESSURE: 72 MMHG | RESPIRATION RATE: 25 BRPM | HEIGHT: 70 IN

## 2024-10-19 LAB
ANION GAP SERPL CALC-SCNC: 8 MMOL/L (ref 8–16)
BASOPHILS # BLD AUTO: 0.01 K/UL (ref 0–0.2)
BASOPHILS NFR BLD: 0.1 % (ref 0–1.9)
BUN SERPL-MCNC: 13 MG/DL (ref 8–23)
CALCIUM SERPL-MCNC: 8.6 MG/DL (ref 8.7–10.5)
CHLORIDE SERPL-SCNC: 103 MMOL/L (ref 95–110)
CO2 SERPL-SCNC: 25 MMOL/L (ref 23–29)
CREAT SERPL-MCNC: 1.1 MG/DL (ref 0.5–1.4)
DIFFERENTIAL METHOD BLD: ABNORMAL
EOSINOPHIL # BLD AUTO: 0 K/UL (ref 0–0.5)
EOSINOPHIL NFR BLD: 0.1 % (ref 0–8)
ERYTHROCYTE [DISTWIDTH] IN BLOOD BY AUTOMATED COUNT: 14 % (ref 11.5–14.5)
EST. GFR  (NO RACE VARIABLE): >60 ML/MIN/1.73 M^2
GLUCOSE SERPL-MCNC: 126 MG/DL (ref 70–110)
HCT VFR BLD AUTO: 39.9 % (ref 40–54)
HGB BLD-MCNC: 12.8 G/DL (ref 14–18)
IMM GRANULOCYTES # BLD AUTO: 0.1 K/UL (ref 0–0.04)
IMM GRANULOCYTES NFR BLD AUTO: 0.7 % (ref 0–0.5)
LYMPHOCYTES # BLD AUTO: 1.7 K/UL (ref 1–4.8)
LYMPHOCYTES NFR BLD: 11.9 % (ref 18–48)
MCH RBC QN AUTO: 29.2 PG (ref 27–31)
MCHC RBC AUTO-ENTMCNC: 32.1 G/DL (ref 32–36)
MCV RBC AUTO: 91 FL (ref 82–98)
MONOCYTES # BLD AUTO: 1.1 K/UL (ref 0.3–1)
MONOCYTES NFR BLD: 7.5 % (ref 4–15)
NEUTROPHILS # BLD AUTO: 11.5 K/UL (ref 1.8–7.7)
NEUTROPHILS NFR BLD: 79.7 % (ref 38–73)
NRBC BLD-RTO: 0 /100 WBC
PLATELET # BLD AUTO: 245 K/UL (ref 150–450)
PMV BLD AUTO: 10.3 FL (ref 9.2–12.9)
POTASSIUM SERPL-SCNC: 3.9 MMOL/L (ref 3.5–5.1)
RBC # BLD AUTO: 4.38 M/UL (ref 4.6–6.2)
SODIUM SERPL-SCNC: 136 MMOL/L (ref 136–145)
WBC # BLD AUTO: 14.44 K/UL (ref 3.9–12.7)

## 2024-10-19 PROCEDURE — 94761 N-INVAS EAR/PLS OXIMETRY MLT: CPT

## 2024-10-19 PROCEDURE — 85025 COMPLETE CBC W/AUTO DIFF WBC: CPT | Performed by: STUDENT IN AN ORGANIZED HEALTH CARE EDUCATION/TRAINING PROGRAM

## 2024-10-19 PROCEDURE — 63600175 PHARM REV CODE 636 W HCPCS: Performed by: STUDENT IN AN ORGANIZED HEALTH CARE EDUCATION/TRAINING PROGRAM

## 2024-10-19 PROCEDURE — 25000003 PHARM REV CODE 250: Performed by: STUDENT IN AN ORGANIZED HEALTH CARE EDUCATION/TRAINING PROGRAM

## 2024-10-19 PROCEDURE — 90653 IIV ADJUVANT VACCINE IM: CPT | Performed by: STUDENT IN AN ORGANIZED HEALTH CARE EDUCATION/TRAINING PROGRAM

## 2024-10-19 PROCEDURE — 3E02340 INTRODUCTION OF INFLUENZA VACCINE INTO MUSCLE, PERCUTANEOUS APPROACH: ICD-10-PCS | Performed by: STUDENT IN AN ORGANIZED HEALTH CARE EDUCATION/TRAINING PROGRAM

## 2024-10-19 PROCEDURE — 90471 IMMUNIZATION ADMIN: CPT | Performed by: STUDENT IN AN ORGANIZED HEALTH CARE EDUCATION/TRAINING PROGRAM

## 2024-10-19 PROCEDURE — 80048 BASIC METABOLIC PNL TOTAL CA: CPT | Performed by: STUDENT IN AN ORGANIZED HEALTH CARE EDUCATION/TRAINING PROGRAM

## 2024-10-19 RX ORDER — OXYCODONE HYDROCHLORIDE 10 MG/1
10 TABLET ORAL EVERY 4 HOURS PRN
Qty: 25 TABLET | Refills: 0 | Status: SHIPPED | OUTPATIENT
Start: 2024-10-19

## 2024-10-19 RX ADMIN — LOSARTAN POTASSIUM 100 MG: 50 TABLET, FILM COATED ORAL at 08:10

## 2024-10-19 RX ADMIN — RANOLAZINE 500 MG: 500 TABLET, FILM COATED, EXTENDED RELEASE ORAL at 08:10

## 2024-10-19 RX ADMIN — ACETAMINOPHEN 1000 MG: 500 TABLET, FILM COATED ORAL at 05:10

## 2024-10-19 RX ADMIN — ASPIRIN 81 MG: 81 TABLET, COATED ORAL at 08:10

## 2024-10-19 RX ADMIN — EZETIMIBE 10 MG: 10 TABLET ORAL at 08:10

## 2024-10-19 RX ADMIN — AMLODIPINE BESYLATE 5 MG: 5 TABLET ORAL at 08:10

## 2024-10-19 RX ADMIN — MUPIROCIN 1 G: 20 OINTMENT TOPICAL at 08:10

## 2024-10-19 RX ADMIN — INFLUENZA A VIRUS A/VICTORIA/4897/2022 IVR-238 (H1N1) ANTIGEN (FORMALDEHYDE INACTIVATED), INFLUENZA A VIRUS A/THAILAND/8/2022 IVR-237 (H3N2) ANTIGEN (FORMALDEHYDE INACTIVATED), INFLUENZA B VIRUS B/AUSTRIA/1359417/2021 BVR-26 ANTIGEN (FORMALDEHYDE INACTIVATED) 0.5 ML: 15; 15; 15 INJECTION, SUSPENSION INTRAMUSCULAR at 11:10

## 2024-10-19 RX ADMIN — CLOPIDOGREL BISULFATE 75 MG: 75 TABLET, FILM COATED ORAL at 08:10

## 2024-10-19 RX ADMIN — LEVOTHYROXINE SODIUM 125 MCG: 0.03 TABLET ORAL at 05:10

## 2024-10-19 NOTE — PLAN OF CARE
[facility]  Initial Discharge Assessment       Primary Care Provider: Matthew Brown IV, MD    Admission Diagnosis: Carotid stenosis, left [I65.22]    Admission Date: 10/18/2024  Expected Discharge Date: 10/19/2024     Information verified as correct on facesheet. The assessment was completed at the patient's bedside.  Pt lives with adult child. Pt does not have a Living Will or Advance Directive. The Pt is oriented to person, places, and times. PCP last seen 08/01.  Pt denies Coumadin, dialysis, DME, HH, and has not been readmitted into the hospital in the last thirty days.  Pt is capable of performing ADLs without assistance. Pt drives to and from medical appointments. Pt reports she/he takes medication as prescribed; pharmacy used [facility].  Pt verbalized plan to discharge home via family transport. Pt has no other needs to be addressed at this time. CM reviewed the chart and will continue to monitor.       Transition of Care Barriers: None    Payor: [employer] [facility] / Plan: [facility] / Product Type: Commercial /     Extended Emergency Contact Information  Primary Emergency Contact: Leydi Robbins   [place]  Mobile Phone: [phone]  Relation: Friend  Secondary Emergency Contact: Karthikeyan Arzate  Mobile Phone: [phone]  Relation: Son  Preferred language: English   needed? No    Discharge Plan A: Home with family  Discharge Plan B: Home      [id number] - [place], [place] - [address] [place] [facility] [address]  [address]  [place] [address]  Phone: [phone] Fax: [phone]      Initial Assessment (most recent)       Adult Discharge Assessment - 10/19/24 0951          Discharge Assessment    Assessment Type Discharge Planning Assessment     Confirmed/corrected address, phone number and insurance Yes     Confirmed Demographics Correct on Facesheet     Source of Information patient;health record     Reason For  Admission Bilateral carotid artery stenosis     People in Home child(evan), adult     Facility Arrived From: Home     Do you expect to return to your current living situation? Yes     Do you have help at home or someone to help you manage your care at home? Yes     Who are your caregiver(s) and their phone number(s)? Karthikeyan Arzate     Prior to hospitilization cognitive status: Alert/Oriented     Current cognitive status: Alert/Oriented     Walking or Climbing Stairs Difficulty no     Dressing/Bathing Difficulty no     Home Layout Able to live on 1st floor     Equipment Currently Used at Home CPAP     Readmission within 30 days? No     Patient currently being followed by outpatient case management? No     Do you currently have service(s) that help you manage your care at home? No     Do you take prescription medications? Yes     Do you have prescription coverage? Yes     Coverage [employer] [facility] [place] [facility]     Do you have any problems affording any of your prescribed medications? No     Is the patient taking medications as prescribed? yes     Discharge Plan A Home with family     Discharge Plan B Home     DME Needed Upon Discharge  none     Discharge Plan discussed with: Patient     Transition of Care Barriers None

## 2024-10-19 NOTE — CARE UPDATE
10/19/24 0722   Patient Assessment/Suction   Level of Consciousness (AVPU) alert   Respiratory Effort Unlabored;Normal   PRE-TX-O2   Device (Oxygen Therapy) room air   SpO2 97 %   Pulse Oximetry Type Continuous   $ Pulse Oximetry - Multiple Charge Pulse Oximetry - Multiple   Pulse 73   Resp 19     Home cpap unit worn last night

## 2024-10-19 NOTE — PLAN OF CARE
Problem: Adult Inpatient Plan of Care  Goal: Plan of Care Review  Outcome: Adequate for Care Transition  Goal: Patient-Specific Goal (Individualized)  Outcome: Adequate for Care Transition  Goal: Absence of Hospital-Acquired Illness or Injury  Outcome: Adequate for Care Transition  Goal: Optimal Comfort and Wellbeing  Outcome: Adequate for Care Transition  Goal: Readiness for Transition of Care  Outcome: Adequate for Care Transition     Problem: Wound  Goal: Optimal Coping  Outcome: Adequate for Care Transition  Goal: Optimal Functional Ability  Outcome: Adequate for Care Transition  Goal: Absence of Infection Signs and Symptoms  Outcome: Adequate for Care Transition  Goal: Improved Oral Intake  Outcome: Adequate for Care Transition  Goal: Optimal Pain Control and Function  Outcome: Adequate for Care Transition  Goal: Skin Health and Integrity  Outcome: Adequate for Care Transition  Goal: Optimal Wound Healing  Outcome: Adequate for Care Transition     Problem: Fall Injury Risk  Goal: Absence of Fall and Fall-Related Injury  Outcome: Adequate for Care Transition   Uneventful morning. Pt. Eager for discharge.

## 2024-10-19 NOTE — RESPIRATORY THERAPY
10/1955   Patient Assessment/Suction   Level of Consciousness (AVPU) alert   Respiratory Effort Normal;Unlabored   Expansion/Accessory Muscles/Retractions no use of accessory muscles   Rhythm/Pattern, Respiratory unlabored;pattern regular;depth regular;no shortness of breath reported   PRE-TX-O2   Device (Oxygen Therapy) room air   SpO2 95 %   Pulse Oximetry Type Continuous   $ Pulse Oximetry - Multiple Charge Pulse Oximetry - Multiple   Pulse 86   Resp 16   /62   Preset CPAP/BiPAP Settings   Mode Of Delivery CPAP  (home unit)   Patient CPAP/BiPAP Settings   CPAP/BIPAP ID home unit at bedside   Education   $ Education 15 min;Other (see comment)

## 2024-10-19 NOTE — DISCHARGE SUMMARY
Patient was admitted on 10/18/2024 and underwent elective left carotid endarterectomy.  Postoperatively he was monitored in the ICU did well.  Neurologically intact.  Neck is soft.  Requiring no pressors.  No hoarseness.  Tolerating regular diet.  Ready for discharge today we will follow up with Dr. Burch as an outpatient.

## 2024-10-19 NOTE — PLAN OF CARE
10/19/24 1006   Final Note   Assessment Type Final Discharge Note   Anticipated Discharge Disposition Home   Post-Acute Status   Discharge Delays None known at this time     Patient cleared for discharge from case management standpoint.    Chart and discharge orders reviewed.  Patient discharged home with no further case management needs.

## 2025-02-11 ENCOUNTER — OFFICE VISIT (OUTPATIENT)
Dept: URGENT CARE | Facility: CLINIC | Age: 70
End: 2025-02-11
Payer: COMMERCIAL

## 2025-02-11 VITALS
DIASTOLIC BLOOD PRESSURE: 78 MMHG | HEART RATE: 68 BPM | RESPIRATION RATE: 16 BRPM | OXYGEN SATURATION: 99 % | TEMPERATURE: 98 F | HEIGHT: 70 IN | BODY MASS INDEX: 32.64 KG/M2 | WEIGHT: 228 LBS | SYSTOLIC BLOOD PRESSURE: 123 MMHG

## 2025-02-11 DIAGNOSIS — R09.81 NASAL CONGESTION: ICD-10-CM

## 2025-02-11 DIAGNOSIS — B96.89 ACUTE BACTERIAL SINUSITIS: Primary | ICD-10-CM

## 2025-02-11 DIAGNOSIS — R05.9 COUGH, UNSPECIFIED TYPE: ICD-10-CM

## 2025-02-11 DIAGNOSIS — R09.81 SINUS CONGESTION: ICD-10-CM

## 2025-02-11 DIAGNOSIS — J01.90 ACUTE BACTERIAL SINUSITIS: Primary | ICD-10-CM

## 2025-02-11 LAB
CTP QC/QA: YES
CTP QC/QA: YES
FLUAV AG NPH QL: NEGATIVE
FLUBV AG NPH QL: NEGATIVE
SARS CORONAVIRUS 2 ANTIGEN: NEGATIVE

## 2025-02-11 RX ORDER — METHYLPREDNISOLONE 4 MG/1
TABLET ORAL
Qty: 21 EACH | Refills: 0 | Status: SHIPPED | OUTPATIENT
Start: 2025-02-11 | End: 2025-03-04

## 2025-02-11 RX ORDER — PROMETHAZINE HYDROCHLORIDE AND DEXTROMETHORPHAN HYDROBROMIDE 6.25; 15 MG/5ML; MG/5ML
5 SYRUP ORAL NIGHTLY PRN
Qty: 120 ML | Refills: 0 | Status: SHIPPED | OUTPATIENT
Start: 2025-02-11

## 2025-02-11 RX ORDER — DOXYCYCLINE 100 MG/1
100 CAPSULE ORAL EVERY 12 HOURS
Qty: 14 CAPSULE | Refills: 0 | Status: SHIPPED | OUTPATIENT
Start: 2025-02-11 | End: 2025-02-18

## 2025-02-11 RX ORDER — FLUTICASONE PROPIONATE 50 MCG
1 SPRAY, SUSPENSION (ML) NASAL DAILY
Qty: 9.9 ML | Refills: 0 | Status: SHIPPED | OUTPATIENT
Start: 2025-02-11

## 2025-02-11 NOTE — PATIENT INSTRUCTIONS
If you do have Hypertension or palpitations, it is safe to take Coricidin HBP for relief of sinus symptoms.      Please return here or go to the Emergency Department for any concerns or worsening of condition.  Please drink plenty of fluids.  Please get plenty of rest.  If you were prescribed antibiotics, please take them to completion.  If you were given wait & see antibiotics, please wait 5-7 days before taking them, and only take them if your symptoms have worsened or not improved.  If you do begin taking the antibiotics, please take them to completion.  If you were given a steroid shot in the clinic and have also been given a prescription for a steroid such as Prednisone or a Medrol Dose Pack, please begin taking them tomorrow.  If you do not have Hypertension or any history of palpitations, it is ok to take over the counter Sudafed or Mucinex D or Allegra-D or Claritin-D or Zyrtec-D.  If you do take one of the above, it is ok to combine that with plain over the counter Mucinex or Allegra or Claritin or Zyrtec.  If for example you are taking Zyrtec -D, you can combine that with Mucinex, but not Mucinex-D.  If you are taking Mucinex-D, you can combine that with plain Allegra or Claritin or Zyrtec.   If you do have Hypertension or palpitations, it is safe to take Coricidin HBP for relief of sinus symptoms.  If not allergic, please take over the counter Tylenol (Acetaminophen) and/or Motrin (Ibuprofen) as directed for control of pain and/or fever.  Please follow up with your primary care doctor or specialist as needed.    If you  smoke, please stop smoking.

## 2025-02-11 NOTE — PROGRESS NOTES
"Subjective:      Patient ID: Preston Arzate is a 69 y.o. male.    Vitals:  height is 5' 10" (1.778 m) and weight is 103.4 kg (228 lb). His oral temperature is 98.1 °F (36.7 °C). His blood pressure is 123/78 and his pulse is 68. His respiration is 16 and oxygen saturation is 99%.     Chief Complaint: Cough    Pt states that on Saturday he began to feel bad. Pt states that over the last 2 days his symptoms have gotten worse. Pt states that he is taking otc medications at this time. Pt states that he is not having any fever, sob or chest pain at this time. Pt states that he is having cough, congestion, sinus pressure, sore throat, body aches and fatigue.     Cough  This is a new problem. The current episode started in the past 7 days. The problem has been gradually worsening. The problem occurs hourly. The cough is Non-productive. Associated symptoms include headaches, myalgias, nasal congestion, postnasal drip and a sore throat. Nothing aggravates the symptoms. He has tried nothing for the symptoms.       Constitution: Positive for fatigue.   HENT:  Positive for congestion, postnasal drip, sinus pain, sinus pressure and sore throat.    Neck: neck negative.   Cardiovascular: Negative.    Eyes: Negative.    Respiratory:  Positive for cough.    Gastrointestinal: Negative.    Endocrine: negative.   Genitourinary: Negative.    Musculoskeletal:  Positive for muscle ache.   Skin: Negative.    Allergic/Immunologic: Negative.    Neurological:  Positive for headaches.   Hematologic/Lymphatic: Negative.    Psychiatric/Behavioral: Negative.        Objective:     Physical Exam   Constitutional: He is oriented to person, place, and time. He is cooperative. He does not appear ill. No distress.   HENT:   Head: Normocephalic and atraumatic.   Ears:   Right Ear: Tympanic membrane, external ear and ear canal normal.   Left Ear: Tympanic membrane, external ear and ear canal normal.   Nose: Congestion present. Right sinus " exhibits maxillary sinus tenderness and frontal sinus tenderness. Left sinus exhibits maxillary sinus tenderness and frontal sinus tenderness.   Mouth/Throat: Mucous membranes are moist. Posterior oropharyngeal erythema present.   Eyes: Conjunctivae are normal. Pupils are equal, round, and reactive to light. Extraocular movement intact   Neck: Neck supple. No neck rigidity present.   Cardiovascular: Normal rate, regular rhythm, normal heart sounds and normal pulses.   Pulmonary/Chest: Effort normal and breath sounds normal. No respiratory distress. He has no wheezes.   Abdominal: Normal appearance.   Musculoskeletal: Normal range of motion.         General: Normal range of motion.      Cervical back: He exhibits no tenderness.   Neurological: no focal deficit. He is alert, oriented to person, place, and time and at baseline.   Skin: Skin is warm and dry.   Psychiatric: His behavior is normal. Mood, judgment and thought content normal.   Nursing note and vitals reviewed.      Assessment:     1. Acute bacterial sinusitis    2. Cough, unspecified type    3. Sinus congestion    4. Nasal congestion        Plan:       Acute bacterial sinusitis  -     methylPREDNISolone (MEDROL DOSEPACK) 4 mg tablet; use as directed  Dispense: 21 each; Refill: 0  -     doxycycline (VIBRAMYCIN) 100 MG Cap; Take 1 capsule (100 mg total) by mouth every 12 (twelve) hours. for 7 days  Dispense: 14 capsule; Refill: 0    Cough, unspecified type  -     POCT Influenza A/B Rapid Antigen  -     SARS Coronavirus 2 Antigen, POCT Manual Read  -     methylPREDNISolone (MEDROL DOSEPACK) 4 mg tablet; use as directed  Dispense: 21 each; Refill: 0  -     promethazine-dextromethorphan (PROMETHAZINE-DM) 6.25-15 mg/5 mL Syrp; Take 5 mLs by mouth nightly as needed (cough).  Dispense: 120 mL; Refill: 0    Sinus congestion  -     methylPREDNISolone (MEDROL DOSEPACK) 4 mg tablet; use as directed  Dispense: 21 each; Refill: 0    Nasal congestion  -      methylPREDNISolone (MEDROL DOSEPACK) 4 mg tablet; use as directed  Dispense: 21 each; Refill: 0  -     fluticasone propionate (FLONASE) 50 mcg/actuation nasal spray; 1 spray (50 mcg total) by Each Nostril route once daily.  Dispense: 9.9 mL; Refill: 0

## 2025-04-16 DIAGNOSIS — I10 ESSENTIAL HYPERTENSION: ICD-10-CM

## 2025-04-22 DIAGNOSIS — I10 ESSENTIAL HYPERTENSION: ICD-10-CM

## 2025-04-22 RX ORDER — LOSARTAN POTASSIUM 100 MG/1
100 TABLET ORAL DAILY
Qty: 90 TABLET | Refills: 3 | Status: SHIPPED | OUTPATIENT
Start: 2025-04-22 | End: 2026-04-17

## 2025-04-29 RX ORDER — LOSARTAN POTASSIUM 100 MG/1
100 TABLET ORAL DAILY
Qty: 90 TABLET | Refills: 3 | Status: SHIPPED | OUTPATIENT
Start: 2025-04-29

## (undated) DEVICE — SOL NACL IRR 1000ML BTL

## (undated) DEVICE — APPLIER LIGACLIP SM 9.38IN

## (undated) DEVICE — APPLICATOR CHLORAPREP ORN 26ML

## (undated) DEVICE — HEMOSTAT SURGICEL 4X8IN

## (undated) DEVICE — LOOP VESSEL MAXI RED

## (undated) DEVICE — SYR COMBO 1ML 27G .5 IN SAFETY

## (undated) DEVICE — SHEATH INTRODUCER 6FR 11CM

## (undated) DEVICE — GOWN POLY REINF BRTH SLV LG

## (undated) DEVICE — DRAPE INCISE IOBAN 2 13X13IN

## (undated) DEVICE — CATH DIAG IMPULSE 6FR MPA1

## (undated) DEVICE — SUT PROLENE 7-0 BV175-6

## (undated) DEVICE — DRAPE INSTR MAGNETIC 10X16IN

## (undated) DEVICE — TUBE FRAZIER 5MM 2FT SOFT TIP

## (undated) DEVICE — SUT VICRYL PLUS 3-0 SH 18IN

## (undated) DEVICE — GLOVE BIOGEL PIMICRO INDIC 6.5

## (undated) DEVICE — BLADE SCALP OPHTL BEVEL STR

## (undated) DEVICE — CATH DXTERITY AR10 100CM 6FR

## (undated) DEVICE — APPLIER CLIP LIAGCLIP 9.375IN

## (undated) DEVICE — KIT SHUNT ARTERY 6
Type: IMPLANTABLE DEVICE | Site: NECK | Status: NON-FUNCTIONAL
Removed: 2024-10-18

## (undated) DEVICE — CATH DXTERITY JL50 100CM 6FR

## (undated) DEVICE — CATH DXTERITY LCB 100CM 6FR

## (undated) DEVICE — SUT PROLENE 6-0 24 BV-1

## (undated) DEVICE — CATH DXTERITY JL40 100CM 6FR

## (undated) DEVICE — ELECTRODE REM PLYHSV RETURN 9

## (undated) DEVICE — DRESSING MEPILEX 4X6IN

## (undated) DEVICE — CATH DXTERITY JR40 100CM 6FR

## (undated) DEVICE — GLOVE BIOGEL PI MICRO SZ 6

## (undated) DEVICE — SUT MCRYL PLUS 4-0 PS2 27IN

## (undated) DEVICE — GUIDEWIRE J 3MM .035IN 150

## (undated) DEVICE — Device

## (undated) DEVICE — TRAY VASCULAR SMH

## (undated) DEVICE — CONTRAST VISIPAQUE 150ML

## (undated) DEVICE — DRAPE T THYROID STERILE

## (undated) DEVICE — DECANTER FLUID TRNSF WHITE 9IN